# Patient Record
Sex: MALE | Race: WHITE | NOT HISPANIC OR LATINO | Employment: FULL TIME | URBAN - METROPOLITAN AREA
[De-identification: names, ages, dates, MRNs, and addresses within clinical notes are randomized per-mention and may not be internally consistent; named-entity substitution may affect disease eponyms.]

---

## 2017-02-03 ENCOUNTER — OFFICE VISIT (OUTPATIENT)
Dept: URGENT CARE | Facility: CLINIC | Age: 67
End: 2017-02-03
Payer: MEDICARE

## 2017-02-03 VITALS
TEMPERATURE: 98.2 F | HEART RATE: 100 BPM | SYSTOLIC BLOOD PRESSURE: 146 MMHG | OXYGEN SATURATION: 96 % | RESPIRATION RATE: 16 BRPM | DIASTOLIC BLOOD PRESSURE: 84 MMHG

## 2017-02-03 DIAGNOSIS — R05.9 COUGH: ICD-10-CM

## 2017-02-03 DIAGNOSIS — J01.00 ACUTE MAXILLARY SINUSITIS, RECURRENCE NOT SPECIFIED: ICD-10-CM

## 2017-02-03 PROCEDURE — G8432 DEP SCR NOT DOC, RNG: HCPCS | Performed by: PHYSICIAN ASSISTANT

## 2017-02-03 PROCEDURE — G8484 FLU IMMUNIZE NO ADMIN: HCPCS | Performed by: PHYSICIAN ASSISTANT

## 2017-02-03 PROCEDURE — 1101F PT FALLS ASSESS-DOCD LE1/YR: CPT | Mod: 8P | Performed by: PHYSICIAN ASSISTANT

## 2017-02-03 PROCEDURE — 4004F PT TOBACCO SCREEN RCVD TLK: CPT | Performed by: PHYSICIAN ASSISTANT

## 2017-02-03 PROCEDURE — 99214 OFFICE O/P EST MOD 30 MIN: CPT | Performed by: PHYSICIAN ASSISTANT

## 2017-02-03 PROCEDURE — 4040F PNEUMOC VAC/ADMIN/RCVD: CPT | Performed by: PHYSICIAN ASSISTANT

## 2017-02-03 PROCEDURE — G8598 ASA/ANTIPLAT THER USED: HCPCS | Performed by: PHYSICIAN ASSISTANT

## 2017-02-03 PROCEDURE — G8420 CALC BMI NORM PARAMETERS: HCPCS | Performed by: PHYSICIAN ASSISTANT

## 2017-02-03 PROCEDURE — 3017F COLORECTAL CA SCREEN DOC REV: CPT | Mod: 1P | Performed by: PHYSICIAN ASSISTANT

## 2017-02-03 RX ORDER — AMOXICILLIN AND CLAVULANATE POTASSIUM 875; 125 MG/1; MG/1
1 TABLET, FILM COATED ORAL 2 TIMES DAILY
Qty: 20 TAB | Refills: 0 | Status: SHIPPED | OUTPATIENT
Start: 2017-02-03 | End: 2017-05-18

## 2017-02-03 NOTE — PROGRESS NOTES
Subjective:      Evans Díaz is a 66 y.o. male who presents with Cough    PMH:  has a past medical history of Bronchitis; Pulmonary hypertension (CMS-HCC); Chronic airway obstruction, not elsewhere classified (CMS-HCC); and Hypertension.  MEDS:   Current outpatient prescriptions:   •  atorvastatin (LIPITOR) 10 MG Tab, Take 1 Tab by mouth every day., Disp: 30 Tab, Rfl: 11  •  enalapril (VASOTEC) 10 MG Tab, Take 1 Tab by mouth 2 times a day., Disp: 180 Tab, Rfl: 3  •  carvedilol (COREG) 6.25 MG Tab, Take 1 Tab by mouth 2 times a day, with meals., Disp: 60 Tab, Rfl: 11  •  aspirin (ASA) 81 MG CHEW chewable tablet, Take 1 Tab by mouth every day., Disp: 100 Tab, Rfl: 11  •  furosemide (LASIX) 20 MG Tab, Take 1 Tab by mouth 1 time daily as needed., Disp: 30 Tab, Rfl: 2  ALLERGIES: No Known Allergies  SURGHX:   Past Surgical History   Procedure Laterality Date   • Cardiac cath  3/1/15     CAD but not CABG candidate.  99% RCA.     SOCHX:  reports that he has been smoking.  He has never used smokeless tobacco. He reports that he does not drink alcohol or use illicit drugs.  FH: family history includes Heart Disease (age of onset: 44) in his brother.          HPI Comments: Patient presents with:  Cough: congestion, X 1 week         Sinusitis  This is a new problem. The current episode started 1 to 4 weeks ago. The problem has been gradually worsening since onset. There has been no fever. The pain is moderate. Associated symptoms include congestion, coughing, headaches and sinus pressure. Past treatments include saline sprays and spray decongestants. The treatment provided mild relief.       Review of Systems   HENT: Positive for congestion and sinus pressure.    Respiratory: Positive for cough and sputum production. Negative for wheezing.    Neurological: Positive for headaches.   All other systems reviewed and are negative.         Objective:     /84 mmHg  Pulse 100  Temp(Src) 36.8 °C (98.2 °F)  Resp 16   SpO2 96%     Physical Exam   Constitutional: He is oriented to person, place, and time. He appears well-developed and well-nourished. No distress.   HENT:   Head: Normocephalic.   Right Ear: Tympanic membrane normal.   Left Ear: Tympanic membrane normal.   Nose: Mucosal edema and rhinorrhea present. Right sinus exhibits maxillary sinus tenderness. Left sinus exhibits maxillary sinus tenderness.   Mouth/Throat: Uvula is midline, oropharynx is clear and moist and mucous membranes are normal.   Eyes: EOM are normal. Pupils are equal, round, and reactive to light.   Neck: Normal range of motion. Neck supple.   Cardiovascular: Normal rate, regular rhythm and normal heart sounds.    Pulmonary/Chest: Effort normal and breath sounds normal. He has no rales.   Abdominal: Soft.   Musculoskeletal: Normal range of motion.   Neurological: He is alert and oriented to person, place, and time.   Skin: Skin is warm.   Psychiatric: He has a normal mood and affect.   Nursing note and vitals reviewed.        Assessment/Plan:     1. Acute maxillary sinusitis, recurrence not specified  amoxicillin-clavulanate (AUGMENTIN) 875-125 MG Tab   2. Cough  amoxicillin-clavulanate (AUGMENTIN) 875-125 MG Tab   PT can continue OTC medications, increase fluids and rest until symptoms improve.     PT should follow up with PCP in 1-2 days for re-evaluation if symptoms have not improved.  Discussed red flags and reasons to return to UC or ED.  Pt and/or family verbalized understanding of diagnosis and follow up instructions and was given informational handout on diagnosis.  PT discharged.

## 2017-02-03 NOTE — MR AVS SNAPSHOT
Evans Díaz   2/3/2017 10:45 AM   Office Visit   MRN: [id number]    Department:  [facility]   Dept Phone:  [phone]    Description:  Male : 1950   Provider:  Melody rCowder PA-C           Reason for Visit     Cough congestion, X 1 week       Allergies as of 2/3/2017     No Known Allergies      You were diagnosed with     Acute maxillary sinusitis, recurrence not specified   [[id number]]       Cough   [786.2.ICD-9-CM]         Vital Signs     Blood Pressure Pulse Temperature Respirations Oxygen Saturation Smoking Status    146/84 mmHg 100 36.8 °C (98.2 °F) 16 96% Current Some Day Smoker      Basic Information     Date Of Birth Sex Race Ethnicity Preferred Language    1950 Male White Non- English      Problem List              ICD-10-CM Priority Class Noted - Resolved    COPD exacerbation (CMS-HCC) J44.1 Medium  2015 - Present    Dilated cardiomyopathy (CMS-HCC) I42.0 High  2015 - Present    Systolic CHF with reduced left ventricular function, NYHA class 2 (CMS-HCC) I50.20   2015 - Present    CAD (coronary artery disease) I25.10   2015 - Present    CKD (chronic kidney disease) stage 3, GFR 30-59 ml/min N18.3   10/1/2015 - Present    Cigarette smoker F17.210   10/6/2016 - Present      Health Maintenance        Date Due Completion Dates    IMM DTaP/Tdap/Td Vaccine (1 - Tdap) 1967 ---    COLONOSCOPY 1998 ---    IMM ZOSTER VACCINE 2008 ---    IMM PNEUMOCOCCAL 65+ (ADULT) LOW/MEDIUM RISK SERIES (2 of 2 - PCV13) 2016    IMM INFLUENZA (1) 2016            Current Immunizations     Influenza TIV (IM) 2014    Pneumococcal polysaccharide vaccine (PPSV-23) 2015 10:08 PM      Below and/or attached are the medications your provider expects you to take. Review all of your home medications and newly ordered medications with your provider and/or pharmacist. Follow medication instructions as directed by your provider and/or  pharmacist. Please keep your medication list with you and share with your provider. Update the information when medications are discontinued, doses are changed, or new medications (including over-the-counter products) are added; and carry medication information at all times in the event of emergency situations     Allergies:  No Known Allergies          Medications  Valid as of: February 03, 2017 - 11:30 AM    Generic Name Brand Name Tablet Size Instructions for use    Amoxicillin-Pot Clavulanate (Tab) AUGMENTIN 875-125 MG Take 1 Tab by mouth 2 times a day.        Aspirin (Chew Tab) ASA 81 MG Take 1 Tab by mouth every day.        Atorvastatin Calcium (Tab) LIPITOR 10 MG Take 1 Tab by mouth every day.        Carvedilol (Tab) COREG 6.25 MG Take 1 Tab by mouth 2 times a day, with meals.        Enalapril Maleate (Tab) VASOTEC 10 MG Take 1 Tab by mouth 2 times a day.        Furosemide (Tab) LASIX 20 MG Take 1 Tab by mouth 1 time daily as needed.        .                 Medicines prescribed today were sent to:     [facility]ACY #[id number] - [place], [address] [address]    Phone: [phone] Fax: [phone]    Open 24 Hours?: No      Medication refill instructions:       If your prescription bottle indicates you have medication refills left, it is not necessary to call your provider’s office. Please contact your pharmacy and they will refill your medication.    If your prescription bottle indicates you do not have any refills left, you may request refills at any time through one of the following ways: The online [employer] system (except Urgent Care), by calling your provider’s office, or by asking your pharmacy to contact your provider’s office with a refill request. Medication refills are processed only during regular business hours and may not be available until the next business day. Your provider may request additional information or to have a follow-up visit  with you prior to refilling your medication.   *Please Note: Medication refills are assigned a new Rx number when refilled electronically. Your pharmacy may indicate that no refills were authorized even though a new prescription for the same medication is available at the pharmacy. Please request the medicine by name with the pharmacy before contacting your provider for a refill.           [employer] Access Code: Activation code not generated  Current [employer] Status: Active

## 2017-02-06 ASSESSMENT — ENCOUNTER SYMPTOMS
SINUS PRESSURE: 1
COUGH: 1
SPUTUM PRODUCTION: 1
WHEEZING: 0
HEADACHES: 1

## 2017-03-10 ENCOUNTER — APPOINTMENT (OUTPATIENT)
Dept: RADIOLOGY | Facility: IMAGING CENTER | Age: 67
End: 2017-03-10
Attending: FAMILY MEDICINE
Payer: COMMERCIAL

## 2017-03-10 ENCOUNTER — OFFICE VISIT (OUTPATIENT)
Dept: URGENT CARE | Facility: CLINIC | Age: 67
End: 2017-03-10
Payer: COMMERCIAL

## 2017-03-10 VITALS
WEIGHT: 186 LBS | DIASTOLIC BLOOD PRESSURE: 64 MMHG | SYSTOLIC BLOOD PRESSURE: 124 MMHG | HEIGHT: 69 IN | RESPIRATION RATE: 16 BRPM | BODY MASS INDEX: 27.55 KG/M2 | OXYGEN SATURATION: 95 % | TEMPERATURE: 98 F | HEART RATE: 78 BPM

## 2017-03-10 DIAGNOSIS — R05.9 COUGH: ICD-10-CM

## 2017-03-10 DIAGNOSIS — J44.1 COPD EXACERBATION (HCC): ICD-10-CM

## 2017-03-10 PROCEDURE — 4040F PNEUMOC VAC/ADMIN/RCVD: CPT | Performed by: FAMILY MEDICINE

## 2017-03-10 PROCEDURE — 3017F COLORECTAL CA SCREEN DOC REV: CPT | Mod: 1P | Performed by: FAMILY MEDICINE

## 2017-03-10 PROCEDURE — G8598 ASA/ANTIPLAT THER USED: HCPCS | Performed by: FAMILY MEDICINE

## 2017-03-10 PROCEDURE — 4004F PT TOBACCO SCREEN RCVD TLK: CPT | Performed by: FAMILY MEDICINE

## 2017-03-10 PROCEDURE — 71020 DX-CHEST-2 VIEWS: CPT | Mod: TC | Performed by: FAMILY MEDICINE

## 2017-03-10 PROCEDURE — 99214 OFFICE O/P EST MOD 30 MIN: CPT | Performed by: FAMILY MEDICINE

## 2017-03-10 PROCEDURE — G8420 CALC BMI NORM PARAMETERS: HCPCS | Performed by: FAMILY MEDICINE

## 2017-03-10 PROCEDURE — G8432 DEP SCR NOT DOC, RNG: HCPCS | Performed by: FAMILY MEDICINE

## 2017-03-10 PROCEDURE — G8484 FLU IMMUNIZE NO ADMIN: HCPCS | Performed by: FAMILY MEDICINE

## 2017-03-10 PROCEDURE — 1101F PT FALLS ASSESS-DOCD LE1/YR: CPT | Mod: 8P | Performed by: FAMILY MEDICINE

## 2017-03-10 RX ORDER — DOXYCYCLINE HYCLATE 100 MG
100 TABLET ORAL 2 TIMES DAILY
Qty: 20 TAB | Refills: 0 | Status: SHIPPED | OUTPATIENT
Start: 2017-03-10 | End: 2017-03-20

## 2017-03-10 RX ORDER — IPRATROPIUM BROMIDE AND ALBUTEROL SULFATE 2.5; .5 MG/3ML; MG/3ML
3 SOLUTION RESPIRATORY (INHALATION) ONCE
Status: COMPLETED | OUTPATIENT
Start: 2017-03-10 | End: 2017-03-10

## 2017-03-10 RX ORDER — ALBUTEROL SULFATE 90 UG/1
2 AEROSOL, METERED RESPIRATORY (INHALATION) EVERY 6 HOURS PRN
Qty: 1 INHALER | Refills: 0 | Status: ON HOLD | OUTPATIENT
Start: 2017-03-10 | End: 2017-06-01

## 2017-03-10 RX ORDER — PREDNISONE 10 MG/1
40 TABLET ORAL DAILY
Qty: 30 TAB | Refills: 0 | Status: SHIPPED | OUTPATIENT
Start: 2017-03-10 | End: 2017-03-14

## 2017-03-10 RX ADMIN — IPRATROPIUM BROMIDE AND ALBUTEROL SULFATE 3 ML: 2.5; .5 SOLUTION RESPIRATORY (INHALATION) at 13:51

## 2017-03-10 NOTE — PROGRESS NOTES
Chief Complaint   Patient presents with   • Cough             Cough  This is a new problem. The current episode started 1 week ago. The problem has been gradually worsening. The problem occurs constantly. The cough is productive of sputum. Associated symptoms include ear congestion, ear pain and nasal congestion. Pertinent negatives include no fever, headaches, sweats, weight loss or wheezing. Nothing aggravates the symptoms.  Patient has tried nothing for the symptoms. There is a history of COPD and current smoker.     Social History   Substance Use Topics   • Smoking status: Current Some Day Smoker -- 0.25 packs/day for 60 years     Last Attempt to Quit: 05/22/2014   • Smokeless tobacco: Never Used   • Alcohol Use: No         Current Outpatient Prescriptions on File Prior to Visit   Medication Sig Dispense Refill   • atorvastatin (LIPITOR) 10 MG Tab Take 1 Tab by mouth every day. 30 Tab 11   • enalapril (VASOTEC) 10 MG Tab Take 1 Tab by mouth 2 times a day. 180 Tab 3   • carvedilol (COREG) 6.25 MG Tab Take 1 Tab by mouth 2 times a day, with meals. 60 Tab 11   • aspirin (ASA) 81 MG CHEW chewable tablet Take 1 Tab by mouth every day. 100 Tab 11   • amoxicillin-clavulanate (AUGMENTIN) 875-125 MG Tab Take 1 Tab by mouth 2 times a day. 20 Tab 0   • furosemide (LASIX) 20 MG Tab Take 1 Tab by mouth 1 time daily as needed. 30 Tab 2     No current facility-administered medications on file prior to visit.            Past Medical History   Diagnosis Date   • Bronchitis    • Pulmonary hypertension (CMS-HCC)    • Chronic airway obstruction, not elsewhere classified (CMS-HCC)    • Hypertension            Review of Systems   Constitutional: Negative for fever and weight loss.   HENT: negative for ear pain.    Cardiovascular - denies chest pain or dyspnea  Respiratory: Positive for cough.  Cough is productive.  Negative for wheezing.    Neurological: Negative for headaches.   GI - denies nausea, vomiting or diarrhea  Neuro -  "denies numbness or tingling.            Objective:     Blood pressure 124/64, pulse 78, temperature 36.7 °C (98 °F), resp. rate 16, height 1.753 m (5' 9\"), weight 84.369 kg (186 lb), SpO2 95 %.    Physical Exam   Constitutional: patient is oriented to person, place, and time. Patient appears well-developed and well-nourished. No distress.   HENT:   Head: Normocephalic and atraumatic.   Right Ear: External ear normal.   Left Ear: External ear normal.   Nose: Mucosal edema and rhinorrhea present. Right sinus exhibits no maxillary sinus tenderness. Left sinus exhibits no maxillary sinus tenderness.   Mouth/Throat: Mucous membranes are normal. No oral lesions. Posterior oropharyngeal erythema present. No oropharyngeal exudate or posterior oropharyngeal edema.   Eyes: Conjunctivae and EOM are normal. Pupils are equal, round, and reactive to light. Right eye exhibits no discharge. Left eye exhibits no discharge. No scleral icterus.   Neck: Normal range of motion. Neck supple. No tracheal deviation present.   Cardiovascular: Normal rate, regular rhythm and normal heart sounds.  Exam reveals no friction rub.    Pulmonary/Chest: Effort normal. No respiratory distress. Patient has no wheezes. Patient has rhonchi. Patient has no rales.    Musculoskeletal:  exhibits no edema.   Lymphadenopathy:     Patient has cervical adenopathy.   Neurological: patient is alert and oriented to person, place, and time.   Skin: Skin is warm and dry. No rash noted. No erythema.   Psychiatric: patient  has a normal mood and affect.  behavior is normal.   Nursing note and vitals reviewed.         Narrative        3/10/2017 12:46 PM    HISTORY/REASON FOR EXAM: Productive cough and fever      TECHNIQUE/EXAM DESCRIPTION AND NUMBER OF VIEWS:  Two views of the chest.    COMPARISON:  1 view chest 3/4/2015    FINDINGS:  The lungs are clear.    The lungs are hyperinflated consistent with chronic obstructive pulmonary disease.    The cardiac silhouette is " normal in size.    There are no pleural effusion.    There are no pneumothoraces.    No significant bony abnormality is present.         Impression        1.  No acute cardiopulmonary abnormality identified.    2.  Chronic obstructive pulmonary disease         Reading Provider Reading Date     Laen Alonzo M.D. Mar 10, 2017            Assessment/Plan:       1. COPD exacerbation (CMS-[facility])  Chest x-ray was personally interpreted and reviewed. Lungs are hyperexpanded.   No acute cardiopulmonary findings. No pulmonary infiltrates or densities. Cardiac silhouette is normal. No hemidiaphragm elevation. No bony abnormalities.    Subjective improvement after duoneb tx      - predniSONE (DELTASONE) 10 MG Tab; Take 4 Tabs by mouth every day for 4 days.  Dispense: 30 Tab; Refill: 0  - doxycycline (VIBRAMYCIN) 100 MG Tab; Take 1 Tab by mouth 2 times a day for 10 days.  Dispense: 20 Tab; Refill: 0  - albuterol 108 (90 BASE) MCG/ACT Aero Soln inhalation aerosol; Inhale 2 Puffs by mouth every 6 hours as needed for Shortness of Breath.  Dispense: 1 Inhaler; Refill: 0  - ipratropium (ATROVENT) 17 MCG/ACT Aero Soln; Inhale 2 Puffs by mouth 3 times a day.  Dispense: 1 Inhaler; Refill: 0    Encouraged smoking cessation        - REFERRAL TO PULMONOLOGY         Supportive care, differential diagnoses, and indications for immediate follow-up discussed with patient.   Pathogenesis of diagnosis discussed including typical length and natural progression.   Instructed to return to clinic or nearest emergency department for any change in condition, further concerns, or worsening of symptoms.  Patient states understanding of the plan of care and discharge instructions.

## 2017-03-10 NOTE — MR AVS SNAPSHOT
"        Evans Díaz   3/10/2017 12:15 PM   Office Visit   MRN: [id number]    Department:  [facility]   Dept Phone:  [phone]    Description:  Male : 1950   Provider:  Randy Covington M.D.           Reason for Visit     Cough           Allergies as of 3/10/2017     No Known Allergies      You were diagnosed with     COPD exacerbation (CMS-HCC)   [181486]         Vital Signs     Blood Pressure Pulse Temperature Respirations Height Weight    124/64 mmHg 78 36.7 °C (98 °F) 16 1.753 m (5' 9\") 84.369 kg (186 lb)    Body Mass Index Oxygen Saturation Smoking Status             27.45 kg/m2 95% Current Some Day Smoker         Basic Information     Date Of Birth Sex Race Ethnicity Preferred Language    1950 Male White Non- English      Your appointments     May 10, 2017 11:00 AM   FOLLOW UP with Carmelo Jacob M.D.   [facility] (--)    [address], [facility] [address]  [facility] [id number] [phone]              Problem List              ICD-10-CM Priority Class Noted - Resolved    COPD exacerbation (CMS-HCC) J44.1 Medium  2015 - Present    Dilated cardiomyopathy (CMS-HCC) I42.0 High  2015 - Present    Systolic CHF with reduced left ventricular function, NYHA class 2 (CMS-HCC) I50.20   2015 - Present    CAD (coronary artery disease) I25.10   2015 - Present    CKD (chronic kidney disease) stage 3, GFR 30-59 ml/min N18.3   10/1/2015 - Present    Cigarette smoker F17.210   10/6/2016 - Present      Health Maintenance        Date Due Completion Dates    IMM DTaP/Tdap/Td Vaccine (1 - Tdap) 1967 ---    COLONOSCOPY 1998 ---    IMM ZOSTER VACCINE 2008 ---    IMM PNEUMOCOCCAL 65+ (ADULT) LOW/MEDIUM RISK SERIES (2 of 2 - PCV13) 2016    IMM INFLUENZA (1) 2016            Current Immunizations     Influenza TIV (IM) 2014    Pneumococcal polysaccharide vaccine (PPSV-23) 2015 10:08 PM      Below " and/or attached are the medications your provider expects you to take. Review all of your home medications and newly ordered medications with your provider and/or pharmacist. Follow medication instructions as directed by your provider and/or pharmacist. Please keep your medication list with you and share with your provider. Update the information when medications are discontinued, doses are changed, or new medications (including over-the-counter products) are added; and carry medication information at all times in the event of emergency situations     Allergies:  No Known Allergies          Medications  Valid as of: March 10, 2017 -  1:40 PM    Generic Name Brand Name Tablet Size Instructions for use    Albuterol Sulfate (Aero Soln) albuterol 108 (90 BASE) MCG/ACT Inhale 2 Puffs by mouth every 6 hours as needed for Shortness of Breath.        Amoxicillin-Pot Clavulanate (Tab) AUGMENTIN 875-125 MG Take 1 Tab by mouth 2 times a day.        Aspirin (Chew Tab) ASA 81 MG Take 1 Tab by mouth every day.        Atorvastatin Calcium (Tab) LIPITOR 10 MG Take 1 Tab by mouth every day.        Carvedilol (Tab) COREG 6.25 MG Take 1 Tab by mouth 2 times a day, with meals.        Doxycycline Hyclate (Tab) VIBRAMYCIN 100 MG Take 1 Tab by mouth 2 times a day for 10 days.        Enalapril Maleate (Tab) VASOTEC 10 MG Take 1 Tab by mouth 2 times a day.        Furosemide (Tab) LASIX 20 MG Take 1 Tab by mouth 1 time daily as needed.        Ipratropium Bromide HFA (Aero Soln) ATROVENT 17 MCG/ACT Inhale 2 Puffs by mouth 3 times a day.        PredniSONE (Tab) DELTASONE 10 MG Take 4 Tabs by mouth every day for 4 days.        .                 Medicines prescribed today were sent to:     [facility]/PHARMACY #[id number] - [place], [place] - [address]    [address] [facility] [place] [address]    Phone: [phone] Fax: [phone]    Open 24 Hours?: No      Medication refill instructions:       If your prescription bottle indicates you  have medication refills left, it is not necessary to call your provider’s office. Please contact your pharmacy and they will refill your medication.    If your prescription bottle indicates you do not have any refills left, you may request refills at any time through one of the following ways: The online [email or web address] system (except Urgent Care), by calling your provider’s office, or by asking your pharmacy to contact your provider’s office with a refill request. Medication refills are processed only during regular business hours and may not be available until the next business day. Your provider may request additional information or to have a follow-up visit with you prior to refilling your medication.   *Please Note: Medication refills are assigned a new Rx number when refilled electronically. Your pharmacy may indicate that no refills were authorized even though a new prescription for the same medication is available at the pharmacy. Please request the medicine by name with the pharmacy before contacting your provider for a refill.        Your To Do List     Future Labs/Procedures Complete By Expires    DX-CHEST-2 VIEWS  As directed 3/10/2018      Referral     A referral request has been sent to our patient care coordination department. Please allow 3-5 business days for us to process this request and contact you either by phone or mail. If you do not hear from us by the 5th business day, please call us at [phone].           [email or web address] Access Code: Activation code not generated  Current [email or web address] Status: Active          Quit Tobacco Information     Do you want to quit using tobacco?    Quitting tobacco decreases risks of cancer, heart and lung disease, increases life expectancy, improves sense of taste and smell, and increases spending money, among other benefits.    If you are thinking about quitting, we can help.  • [facility] Quit Tobacco Program: 632.975.3071  o Program occurs weekly for four weeks and includes  pharmacist consultation on products to support quitting smoking or chewing tobacco. A provider referral is needed for pharmacist consultation.  • Tobacco Users Help Hotline: 5-800QUIT-NOW (820-6455) or https://[place].quitlogix.org/  o Free, confidential telephone and online coaching for [place] residents. Sessions are designed on a schedule that is convenient for you. Eligible clients receive free nicotine replacement therapy.  • Nationally: www.smokefree.gov  o Information and professional assistance to support both immediate and long-term needs as you become, and remain, a non-smoker. Smokefree.gov allows you to choose the help that best fits your needs.

## 2017-05-10 ENCOUNTER — TELEPHONE (OUTPATIENT)
Dept: CARDIOLOGY | Facility: MEDICAL CENTER | Age: 67
End: 2017-05-10

## 2017-05-10 ENCOUNTER — OFFICE VISIT (OUTPATIENT)
Dept: CARDIOLOGY | Facility: MEDICAL CENTER | Age: 67
End: 2017-05-10
Payer: COMMERCIAL

## 2017-05-10 VITALS
OXYGEN SATURATION: 91 % | DIASTOLIC BLOOD PRESSURE: 80 MMHG | HEIGHT: 69 IN | BODY MASS INDEX: 27.25 KG/M2 | WEIGHT: 184 LBS | HEART RATE: 82 BPM | SYSTOLIC BLOOD PRESSURE: 110 MMHG

## 2017-05-10 DIAGNOSIS — F17.210 CIGARETTE SMOKER: ICD-10-CM

## 2017-05-10 DIAGNOSIS — I25.10 CORONARY ARTERY DISEASE INVOLVING NATIVE CORONARY ARTERY OF NATIVE HEART WITHOUT ANGINA PECTORIS: ICD-10-CM

## 2017-05-10 DIAGNOSIS — N18.30 CKD (CHRONIC KIDNEY DISEASE) STAGE 3, GFR 30-59 ML/MIN (HCC): ICD-10-CM

## 2017-05-10 DIAGNOSIS — I42.0 DILATED CARDIOMYOPATHY (HCC): ICD-10-CM

## 2017-05-10 DIAGNOSIS — J44.1 COPD EXACERBATION (HCC): ICD-10-CM

## 2017-05-10 PROCEDURE — G8419 CALC BMI OUT NRM PARAM NOF/U: HCPCS | Performed by: INTERNAL MEDICINE

## 2017-05-10 PROCEDURE — 4004F PT TOBACCO SCREEN RCVD TLK: CPT | Performed by: INTERNAL MEDICINE

## 2017-05-10 PROCEDURE — 99213 OFFICE O/P EST LOW 20 MIN: CPT | Performed by: INTERNAL MEDICINE

## 2017-05-10 PROCEDURE — 1101F PT FALLS ASSESS-DOCD LE1/YR: CPT | Mod: 8P | Performed by: INTERNAL MEDICINE

## 2017-05-10 PROCEDURE — G8432 DEP SCR NOT DOC, RNG: HCPCS | Performed by: INTERNAL MEDICINE

## 2017-05-10 PROCEDURE — 4040F PNEUMOC VAC/ADMIN/RCVD: CPT | Performed by: INTERNAL MEDICINE

## 2017-05-10 PROCEDURE — G8598 ASA/ANTIPLAT THER USED: HCPCS | Performed by: INTERNAL MEDICINE

## 2017-05-10 ASSESSMENT — ENCOUNTER SYMPTOMS
DIZZINESS: 0
PALPITATIONS: 0
WEAKNESS: 0
BLURRED VISION: 0
VOMITING: 0
NAUSEA: 0
SHORTNESS OF BREATH: 1
CONSTITUTIONAL NEGATIVE: 1

## 2017-05-10 NOTE — TELEPHONE ENCOUNTER
----- Message from Rita Lynch, Med Ass't sent at 5/10/2017 12:52 PM PDT -----  Regarding: Heart cath  kC,    Per Dr. Jacob - this patient needs to be scheduled for a LHC w/poss. Can you please call this patient and schedule him?    Thank You,  Rita

## 2017-05-10 NOTE — PROGRESS NOTES
Subjective:   Evans Díaz is a 67 y.o. male who presents today for follow-up of dilated cardiomyopathy and coronary disease. He underwent angiography in February 2015 because of new onset of LV dysfunction. At that time, he was found to have an ejection fraction of 20%, high-grade right coronary lesion and a moderate LAD lesion. Coronary intervention was not performed because of the severity of his LV dysfunction.   With medical therapy his LV function has recovered a recent echo shows his EF is 55%. He's had no angina.  Unfortunately he is still smoking cigarettes. His wife passed way recently from Benítez occasions of lung disease. He is not taking any furosemide and has had no edema.    Past Medical History   Diagnosis Date   • Bronchitis    • Pulmonary hypertension (CMS-HCC)    • Chronic airway obstruction, not elsewhere classified    • Hypertension      Past Surgical History   Procedure Laterality Date   • Cardiac cath  3/1/15     CAD but not CABG candidate.  99% RCA.     Family History   Problem Relation Age of Onset   • Heart Disease Brother 44     valve replacement     History   Smoking status   • Current Some Day Smoker -- 0.25 packs/day for 60 years   • Last Attempt to Quit: 05/22/2014   Smokeless tobacco   • Never Used     No Known Allergies  Outpatient Encounter Prescriptions as of 5/10/2017   Medication Sig Dispense Refill   • albuterol 108 (90 BASE) MCG/ACT Aero Soln inhalation aerosol Inhale 2 Puffs by mouth every 6 hours as needed for Shortness of Breath. 1 Inhaler 0   • ipratropium (ATROVENT) 17 MCG/ACT Aero Soln Inhale 2 Puffs by mouth 3 times a day. 1 Inhaler 0   • amoxicillin-clavulanate (AUGMENTIN) 875-125 MG Tab Take 1 Tab by mouth 2 times a day. 20 Tab 0   • atorvastatin (LIPITOR) 10 MG Tab Take 1 Tab by mouth every day. 30 Tab 11   • enalapril (VASOTEC) 10 MG Tab Take 1 Tab by mouth 2 times a day. 180 Tab 3   • carvedilol (COREG) 6.25 MG Tab Take 1 Tab by mouth 2 times a day, with meals.  "60 Tab 11   • furosemide (LASIX) 20 MG Tab Take 1 Tab by mouth 1 time daily as needed. 30 Tab 2   • aspirin (ASA) 81 MG CHEW chewable tablet Take 1 Tab by mouth every day. 100 Tab 11     No facility-administered encounter medications on file as of 5/10/2017.     Review of Systems   Constitutional: Negative.  Negative for malaise/fatigue.   Eyes: Negative for blurred vision.   Respiratory: Positive for shortness of breath.    Cardiovascular: Negative for chest pain, palpitations and leg swelling.   Gastrointestinal: Negative for nausea and vomiting.   Neurological: Negative for dizziness and weakness.        Objective:   /80 mmHg  Pulse 82  Ht 1.753 m (5' 9.02\")  Wt 83.462 kg (184 lb)  BMI 27.16 kg/m2  SpO2 91%    Physical Exam   Constitutional: He is oriented to person, place, and time. He appears well-developed and well-nourished. No distress.   HENT:   Head: Normocephalic and atraumatic.   Eyes: Conjunctivae and EOM are normal. Pupils are equal, round, and reactive to light.   Neck: Neck supple. No JVD present.   Cardiovascular: Normal rate and regular rhythm.    Murmur heard.   Systolic murmur is present with a grade of 1/6   Pulmonary/Chest: Effort normal and breath sounds normal. No respiratory distress. He has no wheezes. He has no rales.   Abdominal: Soft. There is no tenderness.   Musculoskeletal: He exhibits no edema.   Neurological: He is alert and oriented to person, place, and time.   Skin: Skin is warm and dry. He is not diaphoretic.   Psychiatric: He has a normal mood and affect.       Assessment:     1. Dilated cardiomyopathy (CMS-HCC)  COMP METABOLIC PANEL    CBC WITH DIFFERENTIAL   2. Coronary artery disease involving native coronary artery of native heart without angina pectoris  COMP METABOLIC PANEL    LIPID PROFILE    CBC WITH DIFFERENTIAL   3. CKD (chronic kidney disease) stage 3, GFR 30-59 ml/min  CBC WITH DIFFERENTIAL   4. Cigarette smoker  LIPID PROFILE    CBC WITH DIFFERENTIAL "   5. COPD exacerbation (CMS-[facility])         Medical Decision Making:  Today's Assessment / Status / Plan:     His LV function is recovered remarkably. Angiography revealed coronary artery disease as described above. He's had no willard angina. His angiograms will be personally reviewed and based on this will make a recommendation regarding either repeat angiography with intent for intervention or stress testing. Continue current medications. Cigarette smoking cessation was discussed today.  The patient's angiograms were personally reviewed. It is a high grade concentric ring proximal right coronary artery lesion and an elongated mid LAD lesion that is calcified. We recommend he undergo repeat angiography  with probable secondary to his right coronary artery and consideration of stenting of his LAD as well. The patient will be notified of the findings and scheduled for angiography.

## 2017-05-10 NOTE — TELEPHONE ENCOUNTER
Patient is scheduled on 5-22-17 for a C w/poss with Dr. Jacob at [facility]. No meds to stop. Patient was told to check in at 6:00am for a 7:30 procedure. Instruction sheet was mailed to patient.

## 2017-05-10 NOTE — MR AVS SNAPSHOT
"        Evans Díaz   5/10/2017 11:00 AM   Office Visit   MRN: [id number]    Department:  [facility]   Dept Phone:  [phone]    Description:  Male : 1950   Provider:  Carmelo Jacob M.D.           Reason for Visit     Follow-Up           Allergies as of 5/10/2017     No Known Allergies      You were diagnosed with     Dilated cardiomyopathy (CMS-HCC)   [518607]       Coronary artery disease involving native coronary artery of native heart without angina pectoris   [6443806]       CKD (chronic kidney disease) stage 3, GFR 30-59 ml/min   [550426]       Cigarette smoker   [612252]       COPD exacerbation (CMS-HCC)   [121585]         Vital Signs     Blood Pressure Pulse Height Weight Body Mass Index Oxygen Saturation    110/80 mmHg 82 1.753 m (5' 9.02\") 83.462 kg (184 lb) 27.16 kg/m2 91%    Smoking Status                   Current Some Day Smoker           Basic Information     Date Of Birth Sex Race Ethnicity Preferred Language    1950 Male White Non- English      Your appointments     2017  9:00 AM   Pulmonary Function Test with PFT-RM3   [facility] (--)    [address]  [address]  [place] [address]   [phone]            2017 10:20 AM   New Patient Pulmonary with A Rotation   [facility] (--)    [address]  [address]  [place] [address]   [phone]            Nov 10, 2017  8:40 AM   FOLLOW UP with Carmelo Jacob M.D.   [facility] (--)    [address]  [place] [address]   [phone]              Problem List              ICD-10-CM Priority Class Noted - Resolved    COPD exacerbation (CMS-HCC) J44.1 Medium  2015 - Present    Dilated cardiomyopathy (CMS-HCC) I42.0 High  2015 - Present    Systolic CHF with reduced left ventricular function, NYHA class 2 (CMS-HCC) I50.20   2015 - Present    CAD (coronary artery disease) I25.10   2015 - " Present    CKD (chronic kidney disease) stage 3, GFR 30-59 ml/min N18.3   10/1/2015 - Present    Cigarette smoker F17.210   10/6/2016 - Present      Health Maintenance        Date Due Completion Dates    IMM DTaP/Tdap/Td Vaccine (1 - Tdap) 2/4/1967 ---    COLONOSCOPY 2/4/1998 ---    IMM ZOSTER VACCINE 2/4/2008 ---    IMM PNEUMOCOCCAL 65+ (ADULT) LOW/MEDIUM RISK SERIES (2 of 2 - PCV13) 2/27/2016 2/27/2015            Current Immunizations     Influenza TIV (IM) 12/1/2014    Pneumococcal polysaccharide vaccine (PPSV-23) 2/27/2015 10:08 PM      Below and/or attached are the medications your provider expects you to take. Review all of your home medications and newly ordered medications with your provider and/or pharmacist. Follow medication instructions as directed by your provider and/or pharmacist. Please keep your medication list with you and share with your provider. Update the information when medications are discontinued, doses are changed, or new medications (including over-the-counter products) are added; and carry medication information at all times in the event of emergency situations     Allergies:  No Known Allergies          Medications  Valid as of: May 10, 2017 - 11:31 AM    Generic Name Brand Name Tablet Size Instructions for use    Albuterol Sulfate (Aero Soln) albuterol 108 (90 BASE) MCG/ACT Inhale 2 Puffs by mouth every 6 hours as needed for Shortness of Breath.        Amoxicillin-Pot Clavulanate (Tab) AUGMENTIN 875-125 MG Take 1 Tab by mouth 2 times a day.        Aspirin (Chew Tab) ASA 81 MG Take 1 Tab by mouth every day.        Atorvastatin Calcium (Tab) LIPITOR 10 MG Take 1 Tab by mouth every day.        Carvedilol (Tab) COREG 6.25 MG Take 1 Tab by mouth 2 times a day, with meals.        Enalapril Maleate (Tab) VASOTEC 10 MG Take 1 Tab by mouth 2 times a day.        Furosemide (Tab) LASIX 20 MG Take 1 Tab by mouth 1 time daily as needed.        Ipratropium Bromide HFA (Aero Soln) ATROVENT 17 MCG/ACT  Inhale 2 Puffs by mouth 3 times a day.        .                 Medicines prescribed today were sent to:     [facility] #[id number] - [place], [place] - [address]    [address] [place] [address]    Phone: [phone] Fax: [phone]    Open 24 Hours?: No      Medication refill instructions:       If your prescription bottle indicates you have medication refills left, it is not necessary to call your provider’s office. Please contact your pharmacy and they will refill your medication.    If your prescription bottle indicates you do not have any refills left, you may request refills at any time through one of the following ways: The online babbel system (except Urgent Care), by calling your provider’s office, or by asking your pharmacy to contact your provider’s office with a refill request. Medication refills are processed only during regular business hours and may not be available until the next business day. Your provider may request additional information or to have a follow-up visit with you prior to refilling your medication.   *Please Note: Medication refills are assigned a new Rx number when refilled electronically. Your pharmacy may indicate that no refills were authorized even though a new prescription for the same medication is available at the pharmacy. Please request the medicine by name with the pharmacy before contacting your provider for a refill.        Your To Do List     Future Labs/Procedures Complete By Expires    CBC WITH DIFFERENTIAL  As directed 11/9/2017    COMP METABOLIC PANEL  As directed 11/9/2017    LIPID PROFILE  As directed 11/9/2017         AudiencePointharVlingo Access Code: Activation code not generated  Current babbel Status: Active          Quit Tobacco Information     Do you want to quit using tobacco?    Quitting tobacco decreases risks of cancer, heart and lung disease, increases life expectancy, improves sense of taste and smell, and increases spending money,  among other benefits.    If you are thinking about quitting, we can help.  • [facility] Quit Tobacco Program: 451.774.4636  o Program occurs weekly for four weeks and includes pharmacist consultation on products to support quitting smoking or chewing tobacco. A provider referral is needed for pharmacist consultation.  • Tobacco Users Help Hotline: 4-800-QUIT-NOW (340-8766) or https://[place].quitlogix.org/  o Free, confidential telephone and online coaching for [place] residents. Sessions are designed on a schedule that is convenient for you. Eligible clients receive free nicotine replacement therapy.  • Nationally: www.smokefree.gov  o Information and professional assistance to support both immediate and long-term needs as you become, and remain, a non-smoker. Smokefree.gov allows you to choose the help that best fits your needs.

## 2017-05-18 ENCOUNTER — TELEPHONE (OUTPATIENT)
Dept: CARDIOLOGY | Facility: MEDICAL CENTER | Age: 67
End: 2017-05-18

## 2017-05-18 NOTE — TELEPHONE ENCOUNTER
FLORENCE Frias, from [facility] pre-admission called. Pt. Is scheduled for [facility] with Dr. Jacob on 6-22-17. Pt. Insisted to Rodriguez that he doesn't have anyone to drive him home from procedure and would like to take a cab..  Rodriguez was advised to discuss reasoning for not taking cabs. Pt. Has a daughter listed; perhaps she could drive him home.

## 2017-05-22 ENCOUNTER — HOSPITAL ENCOUNTER (OUTPATIENT)
Facility: MEDICAL CENTER | Age: 67
End: 2017-05-22
Attending: INTERNAL MEDICINE | Admitting: INTERNAL MEDICINE
Payer: COMMERCIAL

## 2017-05-22 VITALS
WEIGHT: 187.17 LBS | SYSTOLIC BLOOD PRESSURE: 139 MMHG | DIASTOLIC BLOOD PRESSURE: 78 MMHG | OXYGEN SATURATION: 96 % | TEMPERATURE: 97.4 F | HEART RATE: 70 BPM | BODY MASS INDEX: 27.72 KG/M2 | RESPIRATION RATE: 14 BRPM | HEIGHT: 69 IN

## 2017-05-22 PROCEDURE — C1887 CATHETER, GUIDING: HCPCS

## 2017-05-22 PROCEDURE — 360979 HCHG DIAGNOSTIC CATH

## 2017-05-22 PROCEDURE — 307093 HCHG TR BAND RADIAL

## 2017-05-22 PROCEDURE — 93880 EXTRACRANIAL BILAT STUDY: CPT

## 2017-05-22 PROCEDURE — 93880 EXTRACRANIAL BILAT STUDY: CPT | Mod: 26 | Performed by: SURGERY

## 2017-05-22 PROCEDURE — 99152 MOD SED SAME PHYS/QHP 5/>YRS: CPT

## 2017-05-22 PROCEDURE — 93458 L HRT ARTERY/VENTRICLE ANGIO: CPT

## 2017-05-22 PROCEDURE — 700111 HCHG RX REV CODE 636 W/ 250 OVERRIDE (IP)

## 2017-05-22 PROCEDURE — 99153 MOD SED SAME PHYS/QHP EA: CPT

## 2017-05-22 PROCEDURE — 700101 HCHG RX REV CODE 250

## 2017-05-22 PROCEDURE — C1894 INTRO/SHEATH, NON-LASER: HCPCS

## 2017-05-22 PROCEDURE — C1769 GUIDE WIRE: HCPCS

## 2017-05-22 PROCEDURE — 304952 HCHG R 2 PADS

## 2017-05-22 PROCEDURE — 93460 R&L HRT ART/VENTRICLE ANGIO: CPT

## 2017-05-22 RX ORDER — VERAPAMIL HYDROCHLORIDE 2.5 MG/ML
INJECTION, SOLUTION INTRAVENOUS
Status: COMPLETED
Start: 2017-05-22 | End: 2017-05-22

## 2017-05-22 RX ORDER — AMIODARONE HYDROCHLORIDE 150 MG/3ML
INJECTION, SOLUTION INTRAVENOUS
Status: COMPLETED
Start: 2017-05-22 | End: 2017-05-22

## 2017-05-22 RX ORDER — SODIUM CHLORIDE 9 MG/ML
INJECTION, SOLUTION INTRAVENOUS CONTINUOUS
Status: DISCONTINUED | OUTPATIENT
Start: 2017-05-22 | End: 2017-05-22 | Stop reason: HOSPADM

## 2017-05-22 RX ORDER — MIDAZOLAM HYDROCHLORIDE 1 MG/ML
INJECTION INTRAMUSCULAR; INTRAVENOUS
Status: COMPLETED
Start: 2017-05-22 | End: 2017-05-22

## 2017-05-22 RX ORDER — HEPARIN SODIUM,PORCINE 1000/ML
VIAL (ML) INJECTION
Status: COMPLETED
Start: 2017-05-22 | End: 2017-05-22

## 2017-05-22 RX ORDER — SODIUM CHLORIDE 9 MG/ML
INJECTION, SOLUTION INTRAVENOUS
Status: DISCONTINUED | OUTPATIENT
Start: 2017-05-22 | End: 2017-05-22 | Stop reason: HOSPADM

## 2017-05-22 RX ORDER — LIDOCAINE HYDROCHLORIDE 20 MG/ML
INJECTION, SOLUTION INFILTRATION; PERINEURAL
Status: COMPLETED
Start: 2017-05-22 | End: 2017-05-22

## 2017-05-22 RX ADMIN — MIDAZOLAM 2 MG: 1 INJECTION INTRAMUSCULAR; INTRAVENOUS at 07:59

## 2017-05-22 RX ADMIN — AMIODARONE HYDROCHLORIDE 150 MG: 50 INJECTION, SOLUTION INTRAVENOUS at 08:05

## 2017-05-22 RX ADMIN — VERAPAMIL HYDROCHLORIDE 5 MG: 2.5 INJECTION, SOLUTION INTRAVENOUS at 07:33

## 2017-05-22 RX ADMIN — HEPARIN SODIUM 2000 UNITS: 200 INJECTION, SOLUTION INTRAVENOUS at 07:34

## 2017-05-22 RX ADMIN — NITROGLYCERIN 10 ML: 20 INJECTION INTRAVENOUS at 07:33

## 2017-05-22 RX ADMIN — SODIUM CHLORIDE: 9 INJECTION, SOLUTION INTRAVENOUS at 08:45

## 2017-05-22 RX ADMIN — SODIUM CHLORIDE: 9 INJECTION, SOLUTION INTRAVENOUS at 06:57

## 2017-05-22 RX ADMIN — HEPARIN SODIUM: 1000 INJECTION, SOLUTION INTRAVENOUS; SUBCUTANEOUS at 07:33

## 2017-05-22 RX ADMIN — FENTANYL CITRATE 50 MCG: 50 INJECTION, SOLUTION INTRAMUSCULAR; INTRAVENOUS at 08:14

## 2017-05-22 RX ADMIN — LIDOCAINE HYDROCHLORIDE: 20 INJECTION, SOLUTION INFILTRATION; PERINEURAL at 07:33

## 2017-05-22 ASSESSMENT — PAIN SCALES - GENERAL
PAINLEVEL_OUTOF10: 0

## 2017-05-22 NOTE — PROCEDURES
DATE OF SERVICE:  05/22/2017    PROCEDURES:  1.  Selective coronary angiography.  2.  Left heart catheterization.  3.  Left ventriculography.  4.  Emergency cardioversion for ventricular fibrillation.    INDICATIONS:  A 67-year-old gentleman with history of known coronary artery   disease by prior angiography in 2015.  At that time, he had severe LV   dysfunction and coronary intervention was not performed.  The patient has had   effort angina over the last several months.    DESCRIPTION OF PROCEDURE:  The right wrist was sterilely prepped and draped in   the usual fashion and the area of the right femoral artery was anesthetized   with 2% lidocaine.  Conscious sedation was obtained using 1.5 mg of Versed and   50 mcg of fentanyl.  He received additional Versed during the procedure.    Please refer to the nurses' notes for dosages and timing.    The right radial artery was entered with the aid of an ultrasound.  The   6-Bengali sheath was placed.  He was then given 5000 units of intravenous   heparin, 2.5 mg of intra-arterial verapamil and 100 mcg of intra-arterial   nitroglycerin.  A 5-Bengali Corby catheter was placed, but would not seat well   into the right coronary artery.  Selective angiogram was performed.  Shortly   after this, the patient developed ventricular fibrillation, was cardioverted   once at 150 joules with reversion to sinus rhythm.  He was then given 150 mg   of intravenous amiodarone and the Corby catheter was manipulated into the   ostium of the left main coronary artery, where selective angiograms were   performed.    Following this, a right 4-Bengali Rita catheter was placed and advanced into   the ostium of the right coronary artery where selective angiograms were   performed.  Next, a pigtail catheter was exchanged over a wire and a left   heart catheterization was performed.  This was followed by left ventriculogram   using 30 mL of contrast.  A left heart pullback was performed.  The  catheter   was removed and then an additional 100 mcg of intra-arterial nitroglycerin was   given through the sheath.  The sheath was then removed, and hemostasis was   obtained using direct compression of the artery with Hemoband.    ESTIMATED BLOOD LOSS:  10 mL    FLUOROSCOPY TIME:  3.3 minutes.    X-RAY DOSE-AREA PRODUCT:  4952.    TOTAL CONTRAST MEDIA:  147 mL of Omnipaque 350.    COMPLICATIONS:  Ventricular fibrillation after injection of contrast in the   right coronary artery with successful cardioversion x1.    HEMODYNAMICS:  Reveal sinus rhythm throughout procedure.  Aortic pressure   136/88 mmHg.  LV pressure is 147/20 mmHg. LV pullback does not demonstrate a   gradient.    Fluoroscopy of the heart is unremarkable.    The left ventriculogram demonstrates mild hypokinesis of the anterior wall.    Estimated ejection fraction is 45-50%.  The aortic root is unremarkable.    There is no mitral insufficiency.    The right coronary is a dominant vessel and has a 95% stenosis in its proximal   segment.  Immediately distal to this stenosis is a mild area of ectasia.    Just prior to the crux is another 90% lesion.  The right coronary gives rise   to a large caliber RV free wall branch arising midway between the origin and   the acute margin and a smaller branch distally to this.  Beyond the acute   margin is also a 30% stenosis in the right coronary artery.  More distally,   the right coronary artery gives rise to small caliber PDA and a moderate   posterior left ventricular artery.    The left main is short and free of disease.  This vessel bifurcates into the   LAD and circumflex.  The LAD has a 30% lesion very proximally and then gives   rise to a small first diagonal artery and a moderate-sized second diagonal   artery.  Distal to the first diagonal artery is an elongated stenosis of   30-40%.  Beyond the second diagonal artery is another stenosis of 40% or   greater.  In the mid LAD is an 80% stenosis.   Distally, the LAD supplies the   apex and also wraps around the apex to supply the distal half of the inferior   intraventricular septum.  The circumflex has a 40% ostial lesion and then has   a 30% eccentric stenosis.  The circumflex gives rise to a large atrial branch   and 2 tiny proximal marginal arteries.  There is a large bifurcating obtuse   marginal artery present that is unremarkable.  Beyond this, the circumflex has   an eccentric elongated 40% stenosis and then terminates into small distal   marginal branches.    IMPRESSION:  1.  Coronary artery disease with a 40% ostial circumflex.  2.  A 30% proximal circumflex.  3.  Elongated 40% distal circumflex.  4.  An 80% lesion in the mid left anterior descending.  5.  Elongated 40% lesion in the [id number] left anterior descending.  6.  A 95% stenosis or greater in the proximal right coronary artery and 90%   stenosis in the distal right coronary artery.  7.  Elevated LVEDP.  8.  Moderate LV dysfunction.       ____________________________________     ALDO KEITA MD    RPS / NTS    DD:  05/22/2017 08:47:45  DT:  05/22/2017 09:35:27    D#:  [id number]  Job#:  [id number]    cc: LIAT BUCK MD

## 2017-05-22 NOTE — PROGRESS NOTES
Patient seen at bedside by CTS.    Plan for CABG next week,  Thursday 6/1/17 0800.    Pre-op appointment made 12:15 pm on Wednesday 5/31.  Echo done, check carotids today prior to DC home.  See consult note from Dr. Jones for details

## 2017-05-22 NOTE — OR NURSING
0824   PATIENT RECEIVED FROM CATH LAB,  S/P HEART CATH VIA RIGHT RADIAL.  TR BAND INTACT.  DENIES ANY PAIN.  NO FAMILY AT BEDSIDE.      0900   PATIENT TAKING PO FLUID WITHOUT DIFFICULTY.    0930  1ST 2CC OF AIR RELEASED FROM TR BAND.  SITE IS CLEAR.    0945   NEXT 3CC OF AIR RELEASED FROM TR BAND.  SITE IS CLEAR.    1000   NEXT 3CC OF AIR RELEASED FROM TR BAND.  SITE IS CLEAR.    1015   NEXT 3CC OF AIR RELEASED FROM TR BAND.  SITE IS CLEAR.    1030   LAST 1CC OF AIR RELEASED FROM TR BAND.  SITE IS CLEAR.    1035  TR BAND REMOVED AND 2X2 WITH TEGADERM APPLIED.  SITE IS CLEAR.    1100   DR WILLIAM IS HERE TO SEE PATIENT.  STAT CAROTID ORDERED AND DONE.  PATIENT TO BE D/C HOME AFTER AND WILL BE SCHEUDLED FOR [facility] SOMETIMES NEXT WEEK.    1130   DC INSTRUCTIONS GIVEN TO PATIENT.  VERBALIZED UNDERSTANDING OF ALL.   NO FAMILY HERE.  OK FOR PATIENT TO TAKE A CAB HOME.  PATIENT DC TO HOME, AMBULATORY, ESCORTED OUT BY RN TO CALL CAB.

## 2017-05-22 NOTE — CATH LAB
Immediate Post-Operative Note      PreOp Diagnosis: CAD and cardiomyopathy    PostOp Diagnosis: same    Procedure(s) :  Coronary Angiography, Left Heart Catheterization, Left Ventriculography. R radial approach  Surgeon(s):  Carmelo Jacob M.D.    Type of Anesthesia: Moderate Sedation    Specimen: None    Estimated Blood Loss: 10 cc's    Contrast Media:  147 cc's    Fluoro Time: 3.3 min    Xray DAP: 4952    Findings: Moderate LV dysfunction with EF in the 40% range.  Severe RCA stenosis proximally and at [place].  Sequential LAD lesions.    Complications: VF cardioverted to sinus without incident      Carmelo Jacob M.D.  5/22/2017 8:23 AM

## 2017-05-23 NOTE — CONSULTS
DATE OF SERVICE:  2017    CHIEF COMPLAINT:  Chest pain.    HISTORY OF PRESENT ILLNESS:  A 67-year-old gentleman, history of COPD, smokes   1-2 packs of cigarettes a day for his adult life, increased shortness of   breath, neck pain, chest pain.  He is able to walk quite a ways without any   chest pain.  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    We have seen him 2 years ago.  We thought his ejection fraction was too low   for surgery, but now his ejection fraction seems to be toward 50%, echo was   improved since 2015 reconsideration for bypass surgery by Dr. Jacob.    He has had progression of coronary disease to 90% LAD in 2 areas of circumflex   lesion of 40% and a right coronary artery with 99%.  At that time, Dr. Olivas, 2 years ago did not think he was a surgical candidate because he has   low ejection fraction of 21%, but now with a better ejection fraction, he is   a surgical candidate.    He has catheterization because echo showed increase ejection fraction and   continued chest pain.    FAMILY HISTORY:  Brother had coronary bypass surgery.  Wife recently .    PAST MEDICAL HISTORY:  COPD, history of smoking.    SOCIAL HISTORY:  Alcohol, none.  Lives with daughter.    MEDICATIONS:  Per medication list.    ALLERGIES:  None.    REVIEW OF SYSTEMS:  A 14-point review of systems negative except for that   mentioned above.  He has no stroke, TIA, no claudication.    PHYSICAL EXAMINATION:  VITAL SIGNS:  Stable vital signs, heart rate 82, blood pressure 135/85,   respirations 12.  HEENT:  PERRLA. EOM normal.  NECK:  No thyromegaly.  JVP _____.  VASCULAR:  Carotids, radials, femorals 1+, no bruits.  NEUROLOGIC:  Cranial nerves II-XII intact.  Four extremities motor function   equal, reflexes normal.  CARDIAC:  S1, S2.  No S3, no S4.  No murmur or thrill.  PULMONARY:  Clear.  No rales or rhonchi.  Moves air well.  ABDOMEN:  Flat,  benign.  GENITOURINARY AND RECTAL:  Deferred.  EXTREMITIES:  Normal range of motion.  Saphenous vein good for harvesting   bilaterally.  SKIN:  Clear.  PSYCHIATRIC:  Affect appropriate.  STERNUM:  Clear.    IMPRESSION:  1.  Angina.  2.  Coronary artery disease.  3.  Mitral aortic mild regurgitation.  4.  Cigarette smoking, active.  5.  Hypertension.  6.  Dyslipidemia.  7.  Hypercholesterolemia.  8.  Paroxysmal nocturnal fibrillation at times, but not on blood thinner.  9.  Severe dyspnea on exertion because of COPD.    This patient is a Finnish Classification IV, New York Heart Classification   IV.    His STS risk calculations 3% death and 25% morbidity and mortality.  I feel he   probably has a 5% chance of death because of severe COPD, possibility of not   coming off of ventilator, tracheostomy, prolonged ventilation and nursing home   support.    We will do surgery in a week and half after insurance clearance. He does not   have rest pain, does not have night pain, does not pain except with extreme   exertion.  We will work him up and then readmit him for surgery next   Wednesday.  Discussed with Dr. Jacob, spent 2 hours reviewing films,   cardiology discussion patient review.       ____________________________________     MD LILY ASTUDILLO / TATYANA    DD:  05/22/2017 10:53:15  DT:  05/22/2017 12:16:56    D#:  [id number]  Job#:  [id number]

## 2017-05-31 ENCOUNTER — HOSPITAL ENCOUNTER (OUTPATIENT)
Dept: RADIOLOGY | Facility: MEDICAL CENTER | Age: 67
End: 2017-05-31
Attending: THORACIC SURGERY (CARDIOTHORACIC VASCULAR SURGERY) | Admitting: THORACIC SURGERY (CARDIOTHORACIC VASCULAR SURGERY)
Payer: COMMERCIAL

## 2017-05-31 ENCOUNTER — HOSPITAL ENCOUNTER (OUTPATIENT)
Dept: CARDIOLOGY | Facility: MEDICAL CENTER | Age: 67
DRG: 236 | End: 2017-05-31
Attending: THORACIC SURGERY (CARDIOTHORACIC VASCULAR SURGERY)
Payer: COMMERCIAL

## 2017-05-31 ENCOUNTER — HOSPITAL ENCOUNTER (OUTPATIENT)
Dept: RADIOLOGY | Facility: MEDICAL CENTER | Age: 67
DRG: 236 | End: 2017-05-31
Attending: THORACIC SURGERY (CARDIOTHORACIC VASCULAR SURGERY) | Admitting: THORACIC SURGERY (CARDIOTHORACIC VASCULAR SURGERY)
Payer: COMMERCIAL

## 2017-05-31 DIAGNOSIS — I25.119 ATHEROSCLEROSIS OF NATIVE CORONARY ARTERY WITH ANGINA PECTORIS, UNSPECIFIED WHETHER NATIVE OR TRANSPLANTED HEART (HCC): ICD-10-CM

## 2017-05-31 LAB
ABO GROUP BLD: NORMAL
ALBUMIN SERPL BCP-MCNC: 4.6 G/DL (ref 3.2–4.9)
ALBUMIN/GLOB SERPL: 1.4 G/DL
ALP SERPL-CCNC: 102 U/L (ref 30–99)
ALT SERPL-CCNC: 21 U/L (ref 2–50)
ANION GAP SERPL CALC-SCNC: 7 MMOL/L (ref 0–11.9)
APPEARANCE UR: CLEAR
APTT PPP: 31.5 SEC (ref 24.7–36)
AST SERPL-CCNC: 16 U/L (ref 12–45)
BASOPHILS # BLD AUTO: 0.5 % (ref 0–1.8)
BASOPHILS # BLD: 0.05 K/UL (ref 0–0.12)
BILIRUB SERPL-MCNC: 0.4 MG/DL (ref 0.1–1.5)
BILIRUB UR QL STRIP.AUTO: NEGATIVE
BLD GP AB SCN SERPL QL: NORMAL
BUN SERPL-MCNC: 31 MG/DL (ref 8–22)
CALCIUM SERPL-MCNC: 10.1 MG/DL (ref 8.5–10.5)
CHLORIDE SERPL-SCNC: 104 MMOL/L (ref 96–112)
CO2 SERPL-SCNC: 25 MMOL/L (ref 20–33)
COLOR UR: NORMAL
CREAT SERPL-MCNC: 1.32 MG/DL (ref 0.5–1.4)
EKG IMPRESSION: NORMAL
EOSINOPHIL # BLD AUTO: 0.28 K/UL (ref 0–0.51)
EOSINOPHIL NFR BLD: 2.7 % (ref 0–6.9)
ERYTHROCYTE [DISTWIDTH] IN BLOOD BY AUTOMATED COUNT: 45.3 FL (ref 35.9–50)
EST. AVERAGE GLUCOSE BLD GHB EST-MCNC: 137 MG/DL
GFR SERPL CREATININE-BSD FRML MDRD: 54 ML/MIN/1.73 M 2
GLOBULIN SER CALC-MCNC: 3.2 G/DL (ref 1.9–3.5)
GLUCOSE SERPL-MCNC: 120 MG/DL (ref 65–99)
GLUCOSE UR STRIP.AUTO-MCNC: NEGATIVE MG/DL
HBA1C MFR BLD: 6.4 % (ref 0–5.6)
HCT VFR BLD AUTO: 43.2 % (ref 42–52)
HGB BLD-MCNC: 14.6 G/DL (ref 14–18)
IMM GRANULOCYTES # BLD AUTO: 0.07 K/UL (ref 0–0.11)
IMM GRANULOCYTES NFR BLD AUTO: 0.7 % (ref 0–0.9)
INR PPP: 0.96 (ref 0.87–1.13)
KETONES UR STRIP.AUTO-MCNC: NEGATIVE MG/DL
LEUKOCYTE ESTERASE UR QL STRIP.AUTO: NEGATIVE
LYMPHOCYTES # BLD AUTO: 1.89 K/UL (ref 1–4.8)
LYMPHOCYTES NFR BLD: 18.2 % (ref 22–41)
MCH RBC QN AUTO: 30.4 PG (ref 27–33)
MCHC RBC AUTO-ENTMCNC: 33.8 G/DL (ref 33.7–35.3)
MCV RBC AUTO: 90 FL (ref 81.4–97.8)
MICRO URNS: NORMAL
MONOCYTES # BLD AUTO: 0.91 K/UL (ref 0–0.85)
MONOCYTES NFR BLD AUTO: 8.8 % (ref 0–13.4)
NEUTROPHILS # BLD AUTO: 7.2 K/UL (ref 1.82–7.42)
NEUTROPHILS NFR BLD: 69.1 % (ref 44–72)
NITRITE UR QL STRIP.AUTO: NEGATIVE
NRBC # BLD AUTO: 0 K/UL
NRBC BLD AUTO-RTO: 0 /100 WBC
PH UR STRIP.AUTO: 5.5 [PH]
PLATELET # BLD AUTO: 286 K/UL (ref 164–446)
PMV BLD AUTO: 9.8 FL (ref 9–12.9)
POTASSIUM SERPL-SCNC: 4.4 MMOL/L (ref 3.6–5.5)
PROT SERPL-MCNC: 7.8 G/DL (ref 6–8.2)
PROT UR QL STRIP: NEGATIVE MG/DL
PROTHROMBIN TIME: 13.1 SEC (ref 12–14.6)
RBC # BLD AUTO: 4.8 M/UL (ref 4.7–6.1)
RBC UR QL AUTO: NEGATIVE
RH BLD: NORMAL
SODIUM SERPL-SCNC: 136 MMOL/L (ref 135–145)
SP GR UR STRIP.AUTO: 1.01
WBC # BLD AUTO: 10.4 K/UL (ref 4.8–10.8)

## 2017-05-31 PROCEDURE — 93970 EXTREMITY STUDY: CPT | Mod: 26 | Performed by: SURGERY

## 2017-05-31 PROCEDURE — 81003 URINALYSIS AUTO W/O SCOPE: CPT

## 2017-05-31 PROCEDURE — 86850 RBC ANTIBODY SCREEN: CPT

## 2017-05-31 PROCEDURE — 93321 DOPPLER ECHO F-UP/LMTD STD: CPT

## 2017-05-31 PROCEDURE — 80053 COMPREHEN METABOLIC PANEL: CPT

## 2017-05-31 PROCEDURE — 86900 BLOOD TYPING SEROLOGIC ABO: CPT

## 2017-05-31 PROCEDURE — 85610 PROTHROMBIN TIME: CPT

## 2017-05-31 PROCEDURE — 71020 DX-CHEST-2 VIEWS: CPT

## 2017-05-31 PROCEDURE — 83036 HEMOGLOBIN GLYCOSYLATED A1C: CPT

## 2017-05-31 PROCEDURE — 86901 BLOOD TYPING SEROLOGIC RH(D): CPT

## 2017-05-31 PROCEDURE — 85025 COMPLETE CBC W/AUTO DIFF WBC: CPT

## 2017-05-31 PROCEDURE — 93970 EXTREMITY STUDY: CPT

## 2017-05-31 PROCEDURE — 93005 ELECTROCARDIOGRAM TRACING: CPT | Performed by: THORACIC SURGERY (CARDIOTHORACIC VASCULAR SURGERY)

## 2017-05-31 PROCEDURE — 93325 DOPPLER ECHO COLOR FLOW MAPG: CPT

## 2017-05-31 PROCEDURE — 93312 ECHO TRANSESOPHAGEAL: CPT

## 2017-05-31 PROCEDURE — 85730 THROMBOPLASTIN TIME PARTIAL: CPT

## 2017-05-31 PROCEDURE — 93010 ELECTROCARDIOGRAM REPORT: CPT | Performed by: INTERNAL MEDICINE

## 2017-05-31 NOTE — CARE PLAN
Problem: Pre Op  Goal: Optimal preparation for CABG/Heart Valve surgery  Intervention: Pre Op education to patient/significant other. Provide patient [facility] Patient Guideline for Cardiac Surgery (See Pt. Ed.)  Discussed anatomy and physiology of cardiac surgery with patient and family to include pre-op regimen. Reviewed post-op expectations to include  the use of incentive spirometry with return demonstration, ventilator management, cardiac monitoring, tubes and drains, early ambulation, and expected length of stay. Also provided information on Cardiac Rehab and how to schedule an appointment. Patient and family state full understanding of all information given.  Intervention: Baseline assessment documented to include IS volume, weight, bilateral BP and peripheral pulses.  2800 mL  Intervention: NPO at midnight except cardiac medications. (No ASA, coumadin or Plavix)  Instructed patient nothing to eat or drink after midnight the night prior to scheduled surgery date.  Intervention: Shower with chlorhexidine x 2  Instructed patient to wash entire body with chlorhexedine wipes prior to bedtime the night before surgery.

## 2017-06-01 ENCOUNTER — APPOINTMENT (OUTPATIENT)
Dept: RADIOLOGY | Facility: MEDICAL CENTER | Age: 67
DRG: 236 | End: 2017-06-01
Attending: THORACIC SURGERY (CARDIOTHORACIC VASCULAR SURGERY)
Payer: COMMERCIAL

## 2017-06-01 ENCOUNTER — HOSPITAL ENCOUNTER (INPATIENT)
Facility: MEDICAL CENTER | Age: 67
LOS: 5 days | DRG: 236 | End: 2017-06-06
Attending: THORACIC SURGERY (CARDIOTHORACIC VASCULAR SURGERY) | Admitting: THORACIC SURGERY (CARDIOTHORACIC VASCULAR SURGERY)
Payer: COMMERCIAL

## 2017-06-01 DIAGNOSIS — I25.10 CORONARY ARTERY DISEASE INVOLVING NATIVE CORONARY ARTERY OF NATIVE HEART WITHOUT ANGINA PECTORIS: ICD-10-CM

## 2017-06-01 LAB
ABO GROUP BLD: NORMAL
ACT BLD: 137 SEC (ref 74–137)
ACT BLD: 147 SEC (ref 74–137)
ACT BLD: 507 SEC (ref 74–137)
ACT BLD: 513 SEC (ref 74–137)
ACT BLD: 574 SEC (ref 74–137)
ACT BLD: 708 SEC (ref 74–137)
APTT PPP: 35.6 SEC (ref 24.7–36)
BASE EXCESS BLDA CALC-SCNC: -2 MMOL/L (ref -4–3)
BASE EXCESS BLDA CALC-SCNC: -2 MMOL/L (ref -4–3)
BASE EXCESS BLDA CALC-SCNC: -3 MMOL/L (ref -4–3)
BASE EXCESS BLDA CALC-SCNC: -3 MMOL/L (ref -4–3)
BASE EXCESS BLDA CALC-SCNC: -4 MMOL/L (ref -4–3)
BASE EXCESS BLDA CALC-SCNC: -6 MMOL/L (ref -4–3)
BASE EXCESS BLDA CALC-SCNC: -7 MMOL/L (ref -4–3)
BASE EXCESS BLDA CALC-SCNC: 1 MMOL/L (ref -4–3)
BASE EXCESS BLDV CALC-SCNC: -5 MMOL/L (ref -4–3)
BODY TEMPERATURE: ABNORMAL DEGREES
CA-I BLD ISE-SCNC: 0.98 MMOL/L (ref 1.1–1.3)
CA-I BLD ISE-SCNC: 1 MMOL/L (ref 1.1–1.3)
CA-I BLD ISE-SCNC: 1.01 MMOL/L (ref 1.1–1.3)
CA-I BLD ISE-SCNC: 1.02 MMOL/L (ref 1.1–1.3)
CA-I BLD ISE-SCNC: 1.02 MMOL/L (ref 1.1–1.3)
CA-I BLD ISE-SCNC: 1.05 MMOL/L (ref 1.1–1.3)
CA-I BLD ISE-SCNC: 1.06 MMOL/L (ref 1.1–1.3)
CA-I BLD ISE-SCNC: 1.19 MMOL/L (ref 1.1–1.3)
CA-I BLD ISE-SCNC: 1.24 MMOL/L (ref 1.1–1.3)
CFT BLD TEG: 6.2 MIN (ref 5–10)
CFT P HPASE BLD TEG: 5.9 MIN (ref 5–10)
CLOT ANGLE BLD TEG: 66.8 DEGREES (ref 53–72)
CLOT ANGLE P HPASE BLD TEG: 70.2 DEGREES (ref 53–72)
CLOT INIT P HPASE BLD TEG: 1.3 MIN (ref 1–3)
CLOT LYSIS 30M P MA LENFR BLD TEG: 0 % (ref 0–8)
CLOT LYSIS 30M P MA LENFR BLD TEG: 0 % (ref 0–8)
CO2 BLDA-SCNC: 21 MMOL/L (ref 20–33)
CO2 BLDA-SCNC: 22 MMOL/L (ref 20–33)
CO2 BLDA-SCNC: 22 MMOL/L (ref 20–33)
CO2 BLDA-SCNC: 23 MMOL/L (ref 20–33)
CO2 BLDA-SCNC: 23 MMOL/L (ref 20–33)
CO2 BLDA-SCNC: 24 MMOL/L (ref 20–33)
CO2 BLDA-SCNC: 25 MMOL/L (ref 20–33)
CO2 BLDA-SCNC: 27 MMOL/L (ref 20–33)
CO2 BLDV-SCNC: 22 MMOL/L (ref 20–33)
CT.EXTRINSIC BLD ROTEM: 1.7 MIN (ref 1–3)
EKG IMPRESSION: NORMAL
GLUCOSE BLD-MCNC: 107 MG/DL (ref 65–99)
GLUCOSE BLD-MCNC: 127 MG/DL (ref 65–99)
GLUCOSE BLD-MCNC: 136 MG/DL (ref 65–99)
GLUCOSE BLD-MCNC: 149 MG/DL (ref 65–99)
GLUCOSE BLD-MCNC: 169 MG/DL (ref 65–99)
GLUCOSE BLD-MCNC: 180 MG/DL (ref 65–99)
GLUCOSE BLD-MCNC: 186 MG/DL (ref 65–99)
GLUCOSE BLD-MCNC: 205 MG/DL (ref 65–99)
GLUCOSE BLD-MCNC: 214 MG/DL (ref 65–99)
GLUCOSE BLD-MCNC: 219 MG/DL (ref 65–99)
GLUCOSE BLD-MCNC: 224 MG/DL (ref 65–99)
GLUCOSE BLD-MCNC: 227 MG/DL (ref 65–99)
GLUCOSE BLD-MCNC: 238 MG/DL (ref 65–99)
GLUCOSE BLD-MCNC: 73 MG/DL (ref 65–99)
GLUCOSE BLD-MCNC: 84 MG/DL (ref 65–99)
GLUCOSE BLD-MCNC: 84 MG/DL (ref 65–99)
GLUCOSE BLD-MCNC: 88 MG/DL (ref 65–99)
HCO3 BLDA-SCNC: 19.8 MMOL/L (ref 17–25)
HCO3 BLDA-SCNC: 20.5 MMOL/L (ref 17–25)
HCO3 BLDA-SCNC: 20.6 MMOL/L (ref 17–25)
HCO3 BLDA-SCNC: 22.1 MMOL/L (ref 17–25)
HCO3 BLDA-SCNC: 22.2 MMOL/L (ref 17–25)
HCO3 BLDA-SCNC: 22.3 MMOL/L (ref 17–25)
HCO3 BLDA-SCNC: 22.4 MMOL/L (ref 17–25)
HCO3 BLDA-SCNC: 22.5 MMOL/L (ref 17–25)
HCO3 BLDA-SCNC: 22.7 MMOL/L (ref 17–25)
HCO3 BLDA-SCNC: 23.1 MMOL/L (ref 17–25)
HCO3 BLDA-SCNC: 24.1 MMOL/L (ref 17–25)
HCO3 BLDA-SCNC: 26 MMOL/L (ref 17–25)
HCO3 BLDV-SCNC: 20.9 MMOL/L (ref 24–28)
HCT VFR BLD CALC: 24 % (ref 42–52)
HCT VFR BLD CALC: 25 % (ref 42–52)
HCT VFR BLD CALC: 26 % (ref 42–52)
HCT VFR BLD CALC: 27 % (ref 42–52)
HCT VFR BLD CALC: 33 % (ref 42–52)
HCT VFR BLD CALC: 35 % (ref 42–52)
HCT VFR BLD CALC: 37 % (ref 42–52)
HGB BLD-MCNC: 11.2 G/DL (ref 14–18)
HGB BLD-MCNC: 11.9 G/DL (ref 14–18)
HGB BLD-MCNC: 12.6 G/DL (ref 14–18)
HGB BLD-MCNC: 8.2 G/DL (ref 14–18)
HGB BLD-MCNC: 8.5 G/DL (ref 14–18)
HGB BLD-MCNC: 8.8 G/DL (ref 14–18)
HGB BLD-MCNC: 9.2 G/DL (ref 14–18)
INR PPP: 1.31 (ref 0.87–1.13)
LACTATE BLD-SCNC: 1.1 MMOL/L (ref 0.5–2)
MAGNESIUM SERPL-MCNC: 2 MG/DL (ref 1.5–2.5)
MCF BLD TEG: 66.9 MM (ref 50–70)
MCF P HPASE BLD TEG: 65.8 MM (ref 50–70)
O2/TOTAL GAS SETTING VFR VENT: 100 %
O2/TOTAL GAS SETTING VFR VENT: 40 %
O2/TOTAL GAS SETTING VFR VENT: 45 %
O2/TOTAL GAS SETTING VFR VENT: 50 %
O2/TOTAL GAS SETTING VFR VENT: 60 %
O2/TOTAL GAS SETTING VFR VENT: 60 %
O2/TOTAL GAS SETTING VFR VENT: 80 %
PCO2 BLDA: 35.9 MMHG (ref 26–37)
PCO2 BLDA: 39.4 MMHG (ref 26–37)
PCO2 BLDA: 40.4 MMHG (ref 26–37)
PCO2 BLDA: 41.4 MMHG (ref 26–37)
PCO2 BLDA: 42.2 MMHG (ref 26–37)
PCO2 BLDA: 42.6 MMHG (ref 26–37)
PCO2 BLDA: 43.3 MMHG (ref 26–37)
PCO2 BLDA: 44 MMHG (ref 26–37)
PCO2 BLDA: 44.8 MMHG (ref 26–37)
PCO2 BLDA: 45.3 MMHG (ref 26–37)
PCO2 BLDA: 45.5 MMHG (ref 26–37)
PCO2 BLDA: 47 MMHG (ref 26–37)
PCO2 BLDV: 40.8 MMHG (ref 41–51)
PCO2 TEMP ADJ BLDA: 35.6 MMHG (ref 26–37)
PCO2 TEMP ADJ BLDA: 38.5 MMHG (ref 26–37)
PCO2 TEMP ADJ BLDA: 40.2 MMHG (ref 26–37)
PCO2 TEMP ADJ BLDA: 40.4 MMHG (ref 26–37)
PCO2 TEMP ADJ BLDA: 42.2 MMHG (ref 26–37)
PCO2 TEMP ADJ BLDA: 42.6 MMHG (ref 26–37)
PCO2 TEMP ADJ BLDA: 43.3 MMHG (ref 26–37)
PCO2 TEMP ADJ BLDA: 43.8 MMHG (ref 26–37)
PCO2 TEMP ADJ BLDA: 44 MMHG (ref 26–37)
PCO2 TEMP ADJ BLDA: 44.8 MMHG (ref 26–37)
PCO2 TEMP ADJ BLDA: 45.3 MMHG (ref 26–37)
PCO2 TEMP ADJ BLDA: 45.4 MMHG (ref 26–37)
PCO2 TEMP ADJ BLDV: 40.8 MMHG (ref 41–51)
PH BLDA: 7.27 [PH] (ref 7.4–7.5)
PH BLDA: 7.28 [PH] (ref 7.4–7.5)
PH BLDA: 7.29 [PH] (ref 7.4–7.5)
PH BLDA: 7.3 [PH] (ref 7.4–7.5)
PH BLDA: 7.3 [PH] (ref 7.4–7.5)
PH BLDA: 7.33 [PH] (ref 7.4–7.5)
PH BLDA: 7.33 [PH] (ref 7.4–7.5)
PH BLDA: 7.35 [PH] (ref 7.4–7.5)
PH BLDA: 7.35 [PH] (ref 7.4–7.5)
PH BLDA: 7.36 [PH] (ref 7.4–7.5)
PH BLDA: 7.38 [PH] (ref 7.4–7.5)
PH BLDA: 7.39 [PH] (ref 7.4–7.5)
PH BLDV: 7.32 [PH] (ref 7.31–7.45)
PH TEMP ADJ BLDA: 7.27 [PH] (ref 7.4–7.5)
PH TEMP ADJ BLDA: 7.28 [PH] (ref 7.4–7.5)
PH TEMP ADJ BLDA: 7.3 [PH] (ref 7.4–7.5)
PH TEMP ADJ BLDA: 7.3 [PH] (ref 7.4–7.5)
PH TEMP ADJ BLDA: 7.32 [PH] (ref 7.4–7.5)
PH TEMP ADJ BLDA: 7.33 [PH] (ref 7.4–7.5)
PH TEMP ADJ BLDA: 7.34 [PH] (ref 7.4–7.5)
PH TEMP ADJ BLDA: 7.35 [PH] (ref 7.4–7.5)
PH TEMP ADJ BLDA: 7.35 [PH] (ref 7.4–7.5)
PH TEMP ADJ BLDA: 7.37 [PH] (ref 7.4–7.5)
PH TEMP ADJ BLDA: 7.38 [PH] (ref 7.4–7.5)
PH TEMP ADJ BLDA: 7.39 [PH] (ref 7.4–7.5)
PH TEMP ADJ BLDV: 7.32 [PH] (ref 7.31–7.45)
PLATELET # BLD AUTO: 187 K/UL (ref 164–446)
PLATELET # BLD AUTO: 225 K/UL (ref 164–446)
PO2 BLDA: 104 MMHG (ref 64–87)
PO2 BLDA: 128 MMHG (ref 64–87)
PO2 BLDA: 205 MMHG (ref 64–87)
PO2 BLDA: 247 MMHG (ref 64–87)
PO2 BLDA: 272 MMHG (ref 64–87)
PO2 BLDA: 273 MMHG (ref 64–87)
PO2 BLDA: 292 MMHG (ref 64–87)
PO2 BLDA: 300 MMHG (ref 64–87)
PO2 BLDA: 377 MMHG (ref 64–87)
PO2 BLDA: 71 MMHG (ref 64–87)
PO2 BLDA: 72 MMHG (ref 64–87)
PO2 BLDA: 85 MMHG (ref 64–87)
PO2 BLDV: 46 MMHG (ref 25–40)
PO2 TEMP ADJ BLDA: 128 MMHG (ref 64–87)
PO2 TEMP ADJ BLDA: 201 MMHG (ref 64–87)
PO2 TEMP ADJ BLDA: 247 MMHG (ref 64–87)
PO2 TEMP ADJ BLDA: 272 MMHG (ref 64–87)
PO2 TEMP ADJ BLDA: 273 MMHG (ref 64–87)
PO2 TEMP ADJ BLDA: 292 MMHG (ref 64–87)
PO2 TEMP ADJ BLDA: 300 MMHG (ref 64–87)
PO2 TEMP ADJ BLDA: 377 MMHG (ref 64–87)
PO2 TEMP ADJ BLDA: 69 MMHG (ref 64–87)
PO2 TEMP ADJ BLDA: 71 MMHG (ref 64–87)
PO2 TEMP ADJ BLDA: 82 MMHG (ref 64–87)
PO2 TEMP ADJ BLDA: 99 MMHG (ref 64–87)
PO2 TEMP ADJ BLDV: 46 MMHG (ref 25–40)
POTASSIUM BLD-SCNC: 4.7 MMOL/L (ref 3.6–5.5)
POTASSIUM BLD-SCNC: 4.8 MMOL/L (ref 3.6–5.5)
POTASSIUM BLD-SCNC: 5.2 MMOL/L (ref 3.6–5.5)
POTASSIUM BLD-SCNC: 5.2 MMOL/L (ref 3.6–5.5)
POTASSIUM BLD-SCNC: 5.8 MMOL/L (ref 3.6–5.5)
POTASSIUM BLD-SCNC: 5.8 MMOL/L (ref 3.6–5.5)
POTASSIUM BLD-SCNC: 6 MMOL/L (ref 3.6–5.5)
POTASSIUM BLD-SCNC: 6.6 MMOL/L (ref 3.6–5.5)
POTASSIUM BLD-SCNC: 6.8 MMOL/L (ref 3.6–5.5)
POTASSIUM SERPL-SCNC: 4.3 MMOL/L (ref 3.6–5.5)
PROTHROMBIN TIME: 16.7 SEC (ref 12–14.6)
SAO2 % BLDA: 100 % (ref 93–99)
SAO2 % BLDA: 94 % (ref 93–99)
SAO2 % BLDA: 94 % (ref 93–99)
SAO2 % BLDA: 96 % (ref 93–99)
SAO2 % BLDA: 97 % (ref 93–99)
SAO2 % BLDA: 99 % (ref 93–99)
SAO2 % BLDV: 78 %
SODIUM BLD-SCNC: 133 MMOL/L (ref 135–145)
SODIUM BLD-SCNC: 135 MMOL/L (ref 135–145)
SODIUM BLD-SCNC: 135 MMOL/L (ref 135–145)
SODIUM BLD-SCNC: 137 MMOL/L (ref 135–145)
SODIUM BLD-SCNC: 138 MMOL/L (ref 135–145)
SODIUM BLD-SCNC: 139 MMOL/L (ref 135–145)
SODIUM BLD-SCNC: 140 MMOL/L (ref 135–145)
SPECIMEN DRAWN FROM PATIENT: ABNORMAL
TEG ALGORITHM TGALG: NORMAL

## 2017-06-01 PROCEDURE — C1729 CATH, DRAINAGE: HCPCS | Performed by: THORACIC SURGERY (CARDIOTHORACIC VASCULAR SURGERY)

## 2017-06-01 PROCEDURE — 160042 HCHG SURGERY MINUTES - EA ADDL 1 MIN LEVEL 5: Performed by: THORACIC SURGERY (CARDIOTHORACIC VASCULAR SURGERY)

## 2017-06-01 PROCEDURE — 700111 HCHG RX REV CODE 636 W/ 250 OVERRIDE (IP): Performed by: THORACIC SURGERY (CARDIOTHORACIC VASCULAR SURGERY)

## 2017-06-01 PROCEDURE — 85384 FIBRINOGEN ACTIVITY: CPT

## 2017-06-01 PROCEDURE — C1751 CATH, INF, PER/CENT/MIDLINE: HCPCS | Performed by: THORACIC SURGERY (CARDIOTHORACIC VASCULAR SURGERY)

## 2017-06-01 PROCEDURE — 93005 ELECTROCARDIOGRAM TRACING: CPT | Performed by: NURSE PRACTITIONER

## 2017-06-01 PROCEDURE — 85347 COAGULATION TIME ACTIVATED: CPT | Mod: 91

## 2017-06-01 PROCEDURE — 700101 HCHG RX REV CODE 250

## 2017-06-01 PROCEDURE — 160031 HCHG SURGERY MINUTES - 1ST 30 MINS LEVEL 5: Performed by: THORACIC SURGERY (CARDIOTHORACIC VASCULAR SURGERY)

## 2017-06-01 PROCEDURE — 500896 HCHG PACK, VASCULAR (FOR ROOM 10): Performed by: THORACIC SURGERY (CARDIOTHORACIC VASCULAR SURGERY)

## 2017-06-01 PROCEDURE — A6402 STERILE GAUZE <= 16 SQ IN: HCPCS | Performed by: THORACIC SURGERY (CARDIOTHORACIC VASCULAR SURGERY)

## 2017-06-01 PROCEDURE — 700111 HCHG RX REV CODE 636 W/ 250 OVERRIDE (IP)

## 2017-06-01 PROCEDURE — 82330 ASSAY OF CALCIUM: CPT | Mod: 91

## 2017-06-01 PROCEDURE — 502654 HCHG INSERT, OFF-PUMP: Performed by: THORACIC SURGERY (CARDIOTHORACIC VASCULAR SURGERY)

## 2017-06-01 PROCEDURE — 160048 HCHG OR STATISTICAL LEVEL 1-5: Performed by: THORACIC SURGERY (CARDIOTHORACIC VASCULAR SURGERY)

## 2017-06-01 PROCEDURE — 500824 HCHG MISTER BLOWER, CTS: Performed by: THORACIC SURGERY (CARDIOTHORACIC VASCULAR SURGERY)

## 2017-06-01 PROCEDURE — 501183 HCHG PUNCH, AORTIC #4: Performed by: THORACIC SURGERY (CARDIOTHORACIC VASCULAR SURGERY)

## 2017-06-01 PROCEDURE — 700102 HCHG RX REV CODE 250 W/ 637 OVERRIDE(OP): Performed by: NURSE PRACTITIONER

## 2017-06-01 PROCEDURE — 503000 HCHG SUTURE, OHS: Performed by: THORACIC SURGERY (CARDIOTHORACIC VASCULAR SURGERY)

## 2017-06-01 PROCEDURE — 82803 BLOOD GASES ANY COMBINATION: CPT | Mod: 91

## 2017-06-01 PROCEDURE — 700111 HCHG RX REV CODE 636 W/ 250 OVERRIDE (IP): Performed by: NURSE PRACTITIONER

## 2017-06-01 PROCEDURE — 85730 THROMBOPLASTIN TIME PARTIAL: CPT

## 2017-06-01 PROCEDURE — A9270 NON-COVERED ITEM OR SERVICE: HCPCS

## 2017-06-01 PROCEDURE — 500112 HCHG BONE WAX: Performed by: THORACIC SURGERY (CARDIOTHORACIC VASCULAR SURGERY)

## 2017-06-01 PROCEDURE — 700105 HCHG RX REV CODE 258: Performed by: THORACIC SURGERY (CARDIOTHORACIC VASCULAR SURGERY)

## 2017-06-01 PROCEDURE — 5A1221Z PERFORMANCE OF CARDIAC OUTPUT, CONTINUOUS: ICD-10-PCS | Performed by: THORACIC SURGERY (CARDIOTHORACIC VASCULAR SURGERY)

## 2017-06-01 PROCEDURE — 500294 HCHG CLAMP, BULLDOG 1/2 FORCE BLUE: Performed by: THORACIC SURGERY (CARDIOTHORACIC VASCULAR SURGERY)

## 2017-06-01 PROCEDURE — 500385 HCHG DRAIN, PLEUROVAC ADUL: Performed by: THORACIC SURGERY (CARDIOTHORACIC VASCULAR SURGERY)

## 2017-06-01 PROCEDURE — 85049 AUTOMATED PLATELET COUNT: CPT | Mod: 91

## 2017-06-01 PROCEDURE — C9248 INJ, CLEVIDIPINE BUTYRATE: HCPCS | Performed by: NURSE PRACTITIONER

## 2017-06-01 PROCEDURE — C1894 INTRO/SHEATH, NON-LASER: HCPCS | Performed by: THORACIC SURGERY (CARDIOTHORACIC VASCULAR SURGERY)

## 2017-06-01 PROCEDURE — 85610 PROTHROMBIN TIME: CPT

## 2017-06-01 PROCEDURE — 502627 HCHG HEMOSTAT, SURGICEL 4X4: Performed by: THORACIC SURGERY (CARDIOTHORACIC VASCULAR SURGERY)

## 2017-06-01 PROCEDURE — B24BZZ4 ULTRASONOGRAPHY OF HEART WITH AORTA, TRANSESOPHAGEAL: ICD-10-PCS | Performed by: THORACIC SURGERY (CARDIOTHORACIC VASCULAR SURGERY)

## 2017-06-01 PROCEDURE — C1898 LEAD, PMKR, OTHER THAN TRANS: HCPCS | Performed by: THORACIC SURGERY (CARDIOTHORACIC VASCULAR SURGERY)

## 2017-06-01 PROCEDURE — 700101 HCHG RX REV CODE 250: Performed by: NURSE PRACTITIONER

## 2017-06-01 PROCEDURE — 02HV33Z INSERTION OF INFUSION DEVICE INTO SUPERIOR VENA CAVA, PERCUTANEOUS APPROACH: ICD-10-PCS | Performed by: THORACIC SURGERY (CARDIOTHORACIC VASCULAR SURGERY)

## 2017-06-01 PROCEDURE — 94667 MNPJ CHEST WALL 1ST: CPT

## 2017-06-01 PROCEDURE — P9047 ALBUMIN (HUMAN), 25%, 50ML: HCPCS

## 2017-06-01 PROCEDURE — 700102 HCHG RX REV CODE 250 W/ 637 OVERRIDE(OP): Performed by: THORACIC SURGERY (CARDIOTHORACIC VASCULAR SURGERY)

## 2017-06-01 PROCEDURE — 500043 HCHG BAG-A-JET: Performed by: THORACIC SURGERY (CARDIOTHORACIC VASCULAR SURGERY)

## 2017-06-01 PROCEDURE — 500734 HCHG INSERT, STEALTH: Performed by: THORACIC SURGERY (CARDIOTHORACIC VASCULAR SURGERY)

## 2017-06-01 PROCEDURE — 503001 HCHG PERFUSION: Performed by: THORACIC SURGERY (CARDIOTHORACIC VASCULAR SURGERY)

## 2017-06-01 PROCEDURE — 71010 DX-CHEST-PORTABLE (1 VIEW): CPT

## 2017-06-01 PROCEDURE — 83605 ASSAY OF LACTIC ACID: CPT

## 2017-06-01 PROCEDURE — 700101 HCHG RX REV CODE 250: Performed by: THORACIC SURGERY (CARDIOTHORACIC VASCULAR SURGERY)

## 2017-06-01 PROCEDURE — C9248 INJ, CLEVIDIPINE BUTYRATE: HCPCS

## 2017-06-01 PROCEDURE — 85014 HEMATOCRIT: CPT | Mod: 91

## 2017-06-01 PROCEDURE — 06BP4ZZ EXCISION OF RIGHT SAPHENOUS VEIN, PERCUTANEOUS ENDOSCOPIC APPROACH: ICD-10-PCS | Performed by: THORACIC SURGERY (CARDIOTHORACIC VASCULAR SURGERY)

## 2017-06-01 PROCEDURE — 770022 HCHG ROOM/CARE - ICU (200)

## 2017-06-01 PROCEDURE — 94002 VENT MGMT INPAT INIT DAY: CPT

## 2017-06-01 PROCEDURE — 500074 HCHG BLADE, BEAVER (OHS): Performed by: THORACIC SURGERY (CARDIOTHORACIC VASCULAR SURGERY)

## 2017-06-01 PROCEDURE — 82947 ASSAY GLUCOSE BLOOD QUANT: CPT | Mod: 91

## 2017-06-01 PROCEDURE — 83735 ASSAY OF MAGNESIUM: CPT

## 2017-06-01 PROCEDURE — 501519 HCHG SUTURE, E PACK: Performed by: THORACIC SURGERY (CARDIOTHORACIC VASCULAR SURGERY)

## 2017-06-01 PROCEDURE — A9270 NON-COVERED ITEM OR SERVICE: HCPCS | Performed by: NURSE PRACTITIONER

## 2017-06-01 PROCEDURE — 84132 ASSAY OF SERUM POTASSIUM: CPT | Mod: 91

## 2017-06-01 PROCEDURE — 700105 HCHG RX REV CODE 258: Performed by: NURSE PRACTITIONER

## 2017-06-01 PROCEDURE — 700102 HCHG RX REV CODE 250 W/ 637 OVERRIDE(OP)

## 2017-06-01 PROCEDURE — 93010 ELECTROCARDIOGRAM REPORT: CPT | Performed by: INTERNAL MEDICINE

## 2017-06-01 PROCEDURE — 700105 HCHG RX REV CODE 258

## 2017-06-01 PROCEDURE — 500815 HCHG LOCATORS, AC VEIN GRAFT: Performed by: THORACIC SURGERY (CARDIOTHORACIC VASCULAR SURGERY)

## 2017-06-01 PROCEDURE — 502240 HCHG MISC OR SUPPLY RC 0272: Performed by: THORACIC SURGERY (CARDIOTHORACIC VASCULAR SURGERY)

## 2017-06-01 PROCEDURE — 03HB33Z INSERTION OF INFUSION DEVICE INTO RIGHT RADIAL ARTERY, PERCUTANEOUS APPROACH: ICD-10-PCS | Performed by: THORACIC SURGERY (CARDIOTHORACIC VASCULAR SURGERY)

## 2017-06-01 PROCEDURE — 501745 HCHG WIRE, SURGICAL STEEL: Performed by: THORACIC SURGERY (CARDIOTHORACIC VASCULAR SURGERY)

## 2017-06-01 PROCEDURE — 500890 HCHG PACK, OPEN HEART: Performed by: THORACIC SURGERY (CARDIOTHORACIC VASCULAR SURGERY)

## 2017-06-01 PROCEDURE — 84295 ASSAY OF SERUM SODIUM: CPT | Mod: 91

## 2017-06-01 PROCEDURE — C1725 CATH, TRANSLUMIN NON-LASER: HCPCS | Performed by: THORACIC SURGERY (CARDIOTHORACIC VASCULAR SURGERY)

## 2017-06-01 PROCEDURE — 02100Z9 BYPASS CORONARY ARTERY, ONE ARTERY FROM LEFT INTERNAL MAMMARY, OPEN APPROACH: ICD-10-PCS | Performed by: THORACIC SURGERY (CARDIOTHORACIC VASCULAR SURGERY)

## 2017-06-01 PROCEDURE — 500767 HCHG KIT, ENDOSCOPIC VEIN HARVEST: Performed by: THORACIC SURGERY (CARDIOTHORACIC VASCULAR SURGERY)

## 2017-06-01 PROCEDURE — 160009 HCHG ANES TIME/MIN: Performed by: THORACIC SURGERY (CARDIOTHORACIC VASCULAR SURGERY)

## 2017-06-01 PROCEDURE — 37799 UNLISTED PX VASCULAR SURGERY: CPT

## 2017-06-01 PROCEDURE — 85576 BLOOD PLATELET AGGREGATION: CPT | Mod: 91

## 2017-06-01 PROCEDURE — 500359 HCHG DILATORS, PARSONNET (OHS): Performed by: THORACIC SURGERY (CARDIOTHORACIC VASCULAR SURGERY)

## 2017-06-01 PROCEDURE — 501445 HCHG STAPLER, SKIN DISP: Performed by: THORACIC SURGERY (CARDIOTHORACIC VASCULAR SURGERY)

## 2017-06-01 PROCEDURE — 82962 GLUCOSE BLOOD TEST: CPT

## 2017-06-01 PROCEDURE — 021009W BYPASS CORONARY ARTERY, ONE ARTERY FROM AORTA WITH AUTOLOGOUS VENOUS TISSUE, OPEN APPROACH: ICD-10-PCS | Performed by: THORACIC SURGERY (CARDIOTHORACIC VASCULAR SURGERY)

## 2017-06-01 PROCEDURE — 500053 HCHG BANDAGE, ELASTIC 4: Performed by: THORACIC SURGERY (CARDIOTHORACIC VASCULAR SURGERY)

## 2017-06-01 DEVICE — LOCATORS AC VEIN GRAFT (OHS) - 10/BX  SCANLAN #250-1001-83: Type: IMPLANTABLE DEVICE | Status: FUNCTIONAL

## 2017-06-01 RX ORDER — OXYCODONE HYDROCHLORIDE 5 MG/1
5 TABLET ORAL
Status: DISCONTINUED | OUTPATIENT
Start: 2017-06-01 | End: 2017-06-06 | Stop reason: HOSPADM

## 2017-06-01 RX ORDER — ACETAMINOPHEN 650 MG/1
650 SUPPOSITORY RECTAL EVERY 4 HOURS PRN
Status: DISCONTINUED | OUTPATIENT
Start: 2017-06-01 | End: 2017-06-06 | Stop reason: HOSPADM

## 2017-06-01 RX ORDER — PAPAVERINE HYDROCHLORIDE 30 MG/ML
INJECTION INTRAMUSCULAR; INTRAVENOUS
Status: DISCONTINUED | OUTPATIENT
Start: 2017-06-01 | End: 2017-06-01 | Stop reason: HOSPADM

## 2017-06-01 RX ORDER — LIDOCAINE HYDROCHLORIDE 10 MG/ML
INJECTION, SOLUTION INFILTRATION; PERINEURAL
Status: COMPLETED
Start: 2017-06-01 | End: 2017-06-01

## 2017-06-01 RX ORDER — SODIUM CHLORIDE, SODIUM LACTATE, POTASSIUM CHLORIDE, CALCIUM CHLORIDE 600; 310; 30; 20 MG/100ML; MG/100ML; MG/100ML; MG/100ML
INJECTION, SOLUTION INTRAVENOUS
Status: COMPLETED
Start: 2017-06-01 | End: 2017-06-01

## 2017-06-01 RX ORDER — DIPHENHYDRAMINE HCL 25 MG
25 TABLET ORAL
Status: DISCONTINUED | OUTPATIENT
Start: 2017-06-01 | End: 2017-06-06 | Stop reason: HOSPADM

## 2017-06-01 RX ORDER — DOCUSATE SODIUM 100 MG/1
100 CAPSULE, LIQUID FILLED ORAL EVERY MORNING
Status: DISCONTINUED | OUTPATIENT
Start: 2017-06-01 | End: 2017-06-06 | Stop reason: HOSPADM

## 2017-06-01 RX ORDER — ASPIRIN 81 MG/1
81 TABLET, CHEWABLE ORAL DAILY
Status: DISCONTINUED | OUTPATIENT
Start: 2017-06-02 | End: 2017-06-06 | Stop reason: HOSPADM

## 2017-06-01 RX ORDER — ALUMINA, MAGNESIA, AND SIMETHICONE 2400; 2400; 240 MG/30ML; MG/30ML; MG/30ML
30 SUSPENSION ORAL EVERY 4 HOURS PRN
Status: DISCONTINUED | OUTPATIENT
Start: 2017-06-01 | End: 2017-06-06 | Stop reason: HOSPADM

## 2017-06-01 RX ORDER — ENEMA 19; 7 G/133ML; G/133ML
1 ENEMA RECTAL
Status: DISCONTINUED | OUTPATIENT
Start: 2017-06-01 | End: 2017-06-06 | Stop reason: HOSPADM

## 2017-06-01 RX ORDER — AMOXICILLIN 250 MG
1 CAPSULE ORAL
Status: DISCONTINUED | OUTPATIENT
Start: 2017-06-01 | End: 2017-06-06 | Stop reason: HOSPADM

## 2017-06-01 RX ORDER — HYDROCODONE BITARTRATE AND ACETAMINOPHEN 5; 325 MG/1; MG/1
1-2 TABLET ORAL EVERY 6 HOURS PRN
Status: DISCONTINUED | OUTPATIENT
Start: 2017-06-01 | End: 2017-06-06 | Stop reason: HOSPADM

## 2017-06-01 RX ORDER — LACTULOSE 20 G/30ML
30 SOLUTION ORAL
Status: DISCONTINUED | OUTPATIENT
Start: 2017-06-01 | End: 2017-06-06 | Stop reason: HOSPADM

## 2017-06-01 RX ORDER — SODIUM CHLORIDE, SODIUM GLUCONATE, SODIUM ACETATE, POTASSIUM CHLORIDE AND MAGNESIUM CHLORIDE 526; 502; 368; 37; 30 MG/100ML; MG/100ML; MG/100ML; MG/100ML; MG/100ML
INJECTION, SOLUTION INTRAVENOUS PRN
Status: DISCONTINUED | OUTPATIENT
Start: 2017-06-01 | End: 2017-06-02 | Stop reason: HOSPADM

## 2017-06-01 RX ORDER — SODIUM CHLORIDE 9 MG/ML
INJECTION, SOLUTION INTRAVENOUS
Status: COMPLETED
Start: 2017-06-01 | End: 2017-06-01

## 2017-06-01 RX ORDER — TRAMADOL HYDROCHLORIDE 50 MG/1
50 TABLET ORAL EVERY 4 HOURS PRN
Status: DISCONTINUED | OUTPATIENT
Start: 2017-06-01 | End: 2017-06-06 | Stop reason: HOSPADM

## 2017-06-01 RX ORDER — MORPHINE SULFATE 4 MG/ML
4 INJECTION, SOLUTION INTRAMUSCULAR; INTRAVENOUS
Status: DISCONTINUED | OUTPATIENT
Start: 2017-06-01 | End: 2017-06-02 | Stop reason: HOSPADM

## 2017-06-01 RX ORDER — SODIUM CHLORIDE 9 MG/ML
INJECTION, SOLUTION INTRAVENOUS CONTINUOUS
Status: DISCONTINUED | OUTPATIENT
Start: 2017-06-01 | End: 2017-06-06 | Stop reason: HOSPADM

## 2017-06-01 RX ORDER — AMOXICILLIN 250 MG
1 CAPSULE ORAL NIGHTLY
Status: DISCONTINUED | OUTPATIENT
Start: 2017-06-01 | End: 2017-06-06 | Stop reason: HOSPADM

## 2017-06-01 RX ORDER — MIDAZOLAM HYDROCHLORIDE 1 MG/ML
.5-2 INJECTION INTRAMUSCULAR; INTRAVENOUS
Status: DISCONTINUED | OUTPATIENT
Start: 2017-06-01 | End: 2017-06-02 | Stop reason: HOSPADM

## 2017-06-01 RX ORDER — CARVEDILOL 3.12 MG/1
3.12 TABLET ORAL 2 TIMES DAILY WITH MEALS
Status: DISCONTINUED | OUTPATIENT
Start: 2017-06-01 | End: 2017-06-03

## 2017-06-01 RX ORDER — ATORVASTATIN CALCIUM 40 MG/1
40 TABLET, FILM COATED ORAL
Status: DISCONTINUED | OUTPATIENT
Start: 2017-06-01 | End: 2017-06-06 | Stop reason: HOSPADM

## 2017-06-01 RX ORDER — LIDOCAINE HYDROCHLORIDE 10 MG/ML
0.5 INJECTION, SOLUTION INFILTRATION; PERINEURAL
Status: COMPLETED | OUTPATIENT
Start: 2017-06-01 | End: 2017-06-01

## 2017-06-01 RX ORDER — OXYCODONE HYDROCHLORIDE 10 MG/1
10 TABLET ORAL
Status: DISCONTINUED | OUTPATIENT
Start: 2017-06-01 | End: 2017-06-06 | Stop reason: HOSPADM

## 2017-06-01 RX ORDER — PROMETHAZINE HYDROCHLORIDE 25 MG/1
25 SUPPOSITORY RECTAL EVERY 6 HOURS PRN
Status: DISCONTINUED | OUTPATIENT
Start: 2017-06-01 | End: 2017-06-02

## 2017-06-01 RX ORDER — LIDOCAINE AND PRILOCAINE 25; 25 MG/G; MG/G
1 CREAM TOPICAL
Status: COMPLETED | OUTPATIENT
Start: 2017-06-01 | End: 2017-06-01

## 2017-06-01 RX ORDER — NITROGLYCERIN 20 MG/100ML
0-100 INJECTION INTRAVENOUS CONTINUOUS
Status: DISCONTINUED | OUTPATIENT
Start: 2017-06-01 | End: 2017-06-02

## 2017-06-01 RX ORDER — BISACODYL 10 MG
10 SUPPOSITORY, RECTAL RECTAL
Status: DISCONTINUED | OUTPATIENT
Start: 2017-06-01 | End: 2017-06-06 | Stop reason: HOSPADM

## 2017-06-01 RX ORDER — ACETAMINOPHEN 325 MG/1
650 TABLET ORAL EVERY 4 HOURS PRN
Status: DISCONTINUED | OUTPATIENT
Start: 2017-06-01 | End: 2017-06-06 | Stop reason: HOSPADM

## 2017-06-01 RX ORDER — CLOPIDOGREL BISULFATE 75 MG/1
75 TABLET ORAL DAILY
Status: DISCONTINUED | OUTPATIENT
Start: 2017-06-02 | End: 2017-06-06 | Stop reason: HOSPADM

## 2017-06-01 RX ORDER — ONDANSETRON 2 MG/ML
4 INJECTION INTRAMUSCULAR; INTRAVENOUS EVERY 6 HOURS PRN
Status: DISCONTINUED | OUTPATIENT
Start: 2017-06-01 | End: 2017-06-02

## 2017-06-01 RX ORDER — DEXTROSE MONOHYDRATE 25 G/50ML
25 INJECTION, SOLUTION INTRAVENOUS PRN
Status: DISCONTINUED | OUTPATIENT
Start: 2017-06-01 | End: 2017-06-06 | Stop reason: HOSPADM

## 2017-06-01 RX ADMIN — SODIUM CHLORIDE 500 ML: 9 INJECTION, SOLUTION INTRAVENOUS at 12:29

## 2017-06-01 RX ADMIN — MORPHINE SULFATE 2 MG: 4 INJECTION INTRAVENOUS at 15:50

## 2017-06-01 RX ADMIN — OXYCODONE HYDROCHLORIDE 10 MG: 10 TABLET ORAL at 23:13

## 2017-06-01 RX ADMIN — MAGNESIUM SULFATE HEPTAHYDRATE 1 G: 1 INJECTION, SOLUTION INTRAVENOUS at 12:54

## 2017-06-01 RX ADMIN — OXYCODONE HYDROCHLORIDE 10 MG: 10 TABLET ORAL at 19:30

## 2017-06-01 RX ADMIN — HYDROCODONE BITARTRATE AND ACETAMINOPHEN 1 TABLET: 5; 325 TABLET ORAL at 17:24

## 2017-06-01 RX ADMIN — CARVEDILOL 3.12 MG: 3.12 TABLET, FILM COATED ORAL at 17:24

## 2017-06-01 RX ADMIN — ATORVASTATIN CALCIUM 40 MG: 40 TABLET, FILM COATED ORAL at 20:00

## 2017-06-01 RX ADMIN — LIDOCAINE HYDROCHLORIDE: 10 INJECTION, SOLUTION INFILTRATION; PERINEURAL at 06:35

## 2017-06-01 RX ADMIN — DIPHENHYDRAMINE HCL 25 MG: 25 TABLET ORAL at 19:30

## 2017-06-01 RX ADMIN — TRANEXAMIC ACID 2000 MG: 100 INJECTION, SOLUTION INTRAVENOUS at 07:45

## 2017-06-01 RX ADMIN — METOPROLOL TARTRATE 12.5 MG: 25 TABLET, FILM COATED ORAL at 06:28

## 2017-06-01 RX ADMIN — ONDANSETRON 4 MG: 2 INJECTION INTRAMUSCULAR; INTRAVENOUS at 17:30

## 2017-06-01 RX ADMIN — STANDARDIZED SENNA CONCENTRATE AND DOCUSATE SODIUM 1 TABLET: 8.6; 5 TABLET, FILM COATED ORAL at 20:00

## 2017-06-01 RX ADMIN — INSULIN HUMAN 1 UNITS: 100 INJECTION, SOLUTION PARENTERAL at 13:15

## 2017-06-01 RX ADMIN — LIDOCAINE HYDROCHLORIDE 0.5 ML: 10 INJECTION, SOLUTION INFILTRATION; PERINEURAL at 06:35

## 2017-06-01 RX ADMIN — DEXMEDETOMIDINE HYDROCHLORIDE 0.7 MCG/KG/HR: 100 INJECTION, SOLUTION, CONCENTRATE INTRAVENOUS at 13:25

## 2017-06-01 RX ADMIN — VANCOMYCIN HYDROCHLORIDE 1500 MG: 100 INJECTION, POWDER, LYOPHILIZED, FOR SOLUTION INTRAVENOUS at 21:05

## 2017-06-01 RX ADMIN — CLEVIDIPINE 4 MG/HR: 0.5 EMULSION INTRAVENOUS at 19:38

## 2017-06-01 RX ADMIN — DEXMEDETOMIDINE HYDROCHLORIDE 0.7 MCG/KG/HR: 100 INJECTION, SOLUTION, CONCENTRATE INTRAVENOUS at 13:27

## 2017-06-01 RX ADMIN — CLEVIDIPINE 2 MG/HR: 0.5 EMULSION INTRAVENOUS at 12:45

## 2017-06-01 ASSESSMENT — COPD QUESTIONNAIRES
COPD SCREENING SCORE: 8
HAVE YOU SMOKED AT LEAST 100 CIGARETTES IN YOUR ENTIRE LIFE: YES
DO YOU EVER COUGH UP ANY MUCUS OR PHLEGM?: YES, A FEW DAYS A WEEK OR MONTH
DURING THE PAST 4 WEEKS HOW MUCH DID YOU FEEL SHORT OF BREATH: MOST  OR ALL OF THE TIME

## 2017-06-01 ASSESSMENT — PAIN SCALES - GENERAL
PAINLEVEL_OUTOF10: 5
PAINLEVEL_OUTOF10: 0
PAINLEVEL_OUTOF10: 0
PAINLEVEL_OUTOF10: 4
PAINLEVEL_OUTOF10: 5
PAINLEVEL_OUTOF10: 4

## 2017-06-01 ASSESSMENT — LIFESTYLE VARIABLES: EVER_SMOKED: YES

## 2017-06-01 NOTE — IP AVS SNAPSHOT
" <p align=\"LEFT\"><IMG SRC=\"//[email or web address]$/[email or web address]\" alt=\"Image\" WIDTH=\"50%\" HEIGHT=\"200\" BORDER=\"\"></p>                   Name:Evans Díaz  Medical Record Number:[id number]  CSN: [id number]    YOB: 1950   Age: 67 y.o.  Sex: male  HT:1.753 m (5' 9\") WT: 83.1 kg (183 lb 3.2 oz)          Admit Date: 6/1/2017     Discharge Date:   Today's Date: 6/6/2017  Attending Doctor:  Dionisio Jones M.D.                  Allergies:  Review of patient's allergies indicates no known allergies.          Your appointments     Jun 22, 2017  1:30 PM   HOSPITAL FOLLOW UP with Madhu Geronimo M.D.   [facility] (--)    [address]  [place] [id number] [phone]            Jun 27, 2017  9:00 AM   Pulmonary Function Test with PFT-RM3   [facility] (--)    [address]  [address]  [place] [address]   [phone]            Jun 27, 2017 10:20 AM   New Patient Pulmonary with A Rotation   [facility] (--)    [address]  [address]  [place] [address]   [phone]            Nov 10, 2017  8:40 AM   FOLLOW UP with Carmelo Jacob M.D.   [facility] (--)    [address]  [place] [address]   [phone]              Follow-up Information     1. Follow up with Dionisio Jones M.D. On 7/5/2017.    Specialty:  Cardiac Surgery    Why:  2:00 pm    Contact information    [id number]  [place] [address]  [phone]           Medication List      Take these Medications        Instructions    albuterol 108 (90 BASE) MCG/ACT Aers inhalation aerosol    Inhale 2 Puffs by mouth every four hours as needed.   Dose:  2 Puff       aspirin 81 MG Chew chewable tablet   Commonly known as:  ASA    Take 1 Tab by mouth every day.   Dose:  81 mg       atorvastatin 40 MG Tabs   Commonly known as:  LIPITOR    Take 1 Tab by mouth every bedtime.   Dose:  40 mg       carvedilol " 6.25 MG Tabs   Commonly known as:  COREG    Take 1 Tab by mouth 2 times a day, with meals.   Dose:  6.25 mg       clopidogrel 75 MG Tabs   Commonly known as:  PLAVIX    Take 1 Tab by mouth every day.   Dose:  75 mg        MG Caps    Take 100 mg by mouth every morning.   Dose:  100 mg       enalapril 5 MG Tabs   Commonly known as:  VASOTEC    Take 2 Tabs by mouth every day.   Dose:  10 mg       furosemide 10 MG/ML Soln   Commonly known as:  LASIX    2 mL by Intravenous route every day.   Dose:  20 mg       hydrocodone-acetaminophen 5-325 MG Tabs per tablet   Commonly known as:  NORCO    Take 1-2 Tabs by mouth every 6 hours as needed.   Dose:  1-2 Tab       ipratropium-albuterol 0.5-2.5 (3) MG/3ML nebulizer solution   Commonly known as:  DUONEB    3 mL by Nebulization route every four hours as needed for Shortness of Breath.   Dose:  3 mL       potassium chloride ER 10 MEQ tablet   Commonly known as:  KLOR-CON    Take 1 Tab by mouth every day.   Dose:  10 mEq       tiotropium 18 MCG Caps   Commonly known as:  SPIRIVA    Inhale 1 Cap by mouth every day.   Dose:  18 mcg

## 2017-06-01 NOTE — IP AVS SNAPSHOT
[employer] Access Code: Activation code not generated  Current [employer] Status: Active    [employer]Visithart  A secure, online tool to manage your health information     [employer]® is a secure, online tool that connects you to your personalized health information from the privacy of your home -- day or night - making it very easy for you to manage your healthcare. Once the activation process is completed, you can even access your medical information using the [employer] chaya, which is available for free in the Apple Chaya store or Google Play store.     [employer] provides the following levels of access (as shown below):   My Chart Features   [facility] Primary Care Doctor [facility]  Specialists [facility]  Urgent  Care Non-[facility]  Primary Care  Doctor   Email your healthcare team securely and privately 24/7 X X X X   Manage appointments: schedule your next appointment; view details of past/upcoming appointments X      Request prescription refills. X      View recent personal medical records, including lab and immunizations X X X X   View health record, including health history, allergies, medications X X X X   Read reports about your outpatient visits, procedures, consult and ER notes X X X X   See your discharge summary, which is a recap of your hospital and/or ER visit that includes your diagnosis, lab results, and care plan. X X       How to register for [employer]:  1. Go to  [email or web address].  2. Click on the Sign Up Now box, which takes you to the New Member Sign Up page. You will need to provide the following information:  a. Enter your [employer] Access Code exactly as it appears at the top of this page. (You will not need to use this code after you’ve completed the sign-up process. If you do not sign up before the expiration date, you must request a new code.)   b. Enter your date of birth.   c. Enter your home email address.   d. Click Submit, and follow the next screen’s instructions.  3. Create a [employer] ID. This will  be your [employer] login ID and cannot be changed, so think of one that is secure and easy to remember.  4. Create a [employer] password. You can change your password at any time.  5. Enter your Password Reset Question and Answer. This can be used at a later time if you forget your password.   6. Enter your e-mail address. This allows you to receive e-mail notifications when new information is available in [employer].  7. Click Sign Up. You can now view your health information.    For assistance activating your [employer] account, call [phone]

## 2017-06-01 NOTE — PROGRESS NOTES
Pt starting to awake, LING, and following commands, SBP spiked up to 140's, Cleviprex titrated up.

## 2017-06-01 NOTE — IP AVS SNAPSHOT
6/6/2017    Evans Díaz  [address] [address]    Dear Evans:    [facility] wants to ensure your discharge home is safe and you or your loved ones have had all of your questions answered regarding your care after you leave the hospital.    Below is a list of resources and contact information should you have any questions regarding your hospital stay, follow-up instructions, or active medical symptoms.    Questions or Concerns Regarding… Contact   Medical Questions Related to Your Discharge  (7 days a week, 8am-5pm) Contact a Nurse Care Coordinator   [phone]   Medical Questions Not Related to Your Discharge  (24 hours a day / 7 days a week)  Contact the Nurse Health Line   [phone]    Medications or Discharge Instructions Refer to your discharge packet   or contact your [facility] Primary Care Provider   [phone]   Follow-up Appointment(s) Schedule your appointment via [employer]   or contact Scheduling [phone]   Billing Review your statement via [employer]  or contact Billing [phone]   Medical Records Review your records via [employer]   or contact Medical Records [phone]     You may receive a telephone call within two days of discharge. This call is to make certain you understand your discharge instructions and have the opportunity to have any questions answered. You can also easily access your medical information, test results and upcoming appointments via the [employer] free online health management tool. You can learn more and sign up at [email or web address]. For assistance setting up your [employer] account, please call [phone].    Once again, we want to ensure your discharge home is safe and that you have a clear understanding of any next steps in your care. If you have any questions or concerns, please do not hesitate to contact us, we are here for you. Thank you for choosing [facility] for your healthcare needs.    Sincerely,    Your [facility] Healthcare Team

## 2017-06-01 NOTE — PROCEDURES
CENTRAL VENOUS/PA CATHETER   Start time 0830  End time 0840    Site:    Subclavian    Laterality:   right    Catheter:   Triple Lumen    Blood return all ports, all ports flushed:  Yes    All elements of maximal sterile barrier techniques met: Yes     Occlusive dressing, antimicrobial disc:  Yes     Ultrasound Guidance & Interpretation:  No     CXR for placement & absence of complications: Pending    Complications:  None    Additional Notes:         Dionisio Qureshi M.D.  6/1/2017  10:38 AM

## 2017-06-01 NOTE — OP REPORT
DATE OF SERVICE:  06/01/2017    DATE OF OPERATION:  06/01/2017    PREOPERATIVE DIAGNOSES:  Angina, coronary artery disease, hypertension,   dyslipidemia, hypercholesterolemia, initially poor ejection fraction and   improved over 2 years, cardiomyopathy, diabetes mellitus.    POSTOPERATIVE DIAGNOSES:  Angina, coronary artery disease, hypertension,   dyslipidemia, hypercholesterolemia, initially poor ejection fraction and   improved over 2 years, cardiomyopathy, diabetes mellitus.    OPERATION:  Coronary artery bypass grafting x2, left JIMBO to distal LAD,   reverse saphenous vein graft to distal right coronary artery, endoscopic vein   harvest, transesophageal echocardiography.    ESTIMATED BLOOD LOSS:  50 mL Cell Saver protocol.    SURGEON:  Dionisio Jones MD    ASSISTANT:  ENA Booker    ANESTHESIOLOGIST:  Dionisio Qureshi MD    ANESTHESIA:  General anesthetic.    DESCRIPTION OF PROCEDURE:  The patient was brought to the operating room,   placed in a supine position upon the table.  After adequate general   anesthesia, the patient prepped and draped in usual sterile manner.  Elbows   were protected to avoid ulnar neuropathy, chest wall expansion to avoid ulnar   neuropathy, phrenic nerve protectors used to protect phrenic nerve, removed at   end of case.    Phrenic nerve was normal, was not damaged.  Anatomy was normal at the end of   case.    Missy Vital harvested the vein from the right leg and thigh.  Side branch   secured using clips.  Leg closed with multilayer Vicryl and Dexon technique.    Missy Vital first assisted the case from start to the finish.    Midline sternal skin incision carried down to sternum, divided with saw, left   JIMBO was harvested, prepared for anastomosis, heparin given, ACT checked.    Patient cannulated with a 6.5 arterial cannula, 2-stage venous return   catheter, antegrade cardioplegia, began cardiopulmonary bypass, cooled 34   degrees, cross clamp applied, cardioplegia delivered.   Heart topically cooled   with cold saline, ice flush.  Phrenic nerves were protected.  Good vessels   noted.    Reverse saphenous vein graft placed to the right coronary at the crux using   6-0 Prolene technique.  This is a very calcified right coronary artery.  There   are no distal arteries to bypass the PDA and posterolateral thread-like;   therefore, we went as low as we could into the inferior distal lesion and I   believe we have good perfusion past this lesion.  Cardioplegia run was   delivered.    Left JIMBO was then sewn to the distal LAD using 7-0 running Prolene technique,   so this below the lesion partially bridging and so give good flow both   antegrade and retrograde into LAD.    Cross-clamp removed, side clamp placed.  Proximal anastomosis performed using   6-0 running Prolene technique for the right coronary artery.  Good anastomosis   appreciated.  AC  placed.    We noticed a small curtain leak at the left JIMBO and this was easily fixed with   octopus retractor 7-0 Prolene technique.    The patient with then brought up to normothermia, good ventilation, weaned   from cardiopulmonary bypass first attempt.  Lines were removed and doubly   secured.  Protamine was delivered.  Meticulous hemostasis present.  Chest   tubes and ventricular temporary pacemaker was placed for safety.    A femoral line was placed for monitoring for safety.    EKG returned normal.  ST segments are normal.  Pressure normal.    Methodical wound examination was done for hemostasis x5 rotations throughout   the mediastinum and no bleeding was noted.  Good hemostasis appreciated.  All   suture lines were tied and secure.    Methodical wound examination was done for foreign objects, none were found.    Counts were correct.    With good cardiodynamics, ST segments normal, pressure numbers are normal,   closure begun.    Good hemostasis appreciated.    Sternum closed with wire followed by linea alba and pectus fascia closure  with   running 0 Vicryl sutures in double-layer technique.  Skin closed with   subcuticular 4-0 Dexon suture technique.  The patient tolerated the procedure   well and transferred to ICU in stable condition.  Prevena dressing was placed   before the patient was transferred.  At dictation, Prevena dressing is being   placed.  I am dictating this in the operating room with the patient in the   operating room.       ____________________________________     MD LILY ASTUDILLO / TATYANA    DD:  06/01/2017 11:40:58  DT:  06/01/2017 12:28:12    D#:  [id number]  Job#:  [id number]

## 2017-06-01 NOTE — PROGRESS NOTES
Pt arrived to room with OR team. Bedside report received from Dr. Qureshi. All lines, tubes, and drips verified. Art line zeroed. Pt with stable /60, SR 80, on .01 mcg/kg/min of Epi . RT and 2nd RN at bedside. EKG called. Will draw labs and check ABG shortly.

## 2017-06-01 NOTE — IP AVS SNAPSHOT
" Home Care Instructions                                                                                                                  Name:Evans Díaz  Medical Record Number:[id number]  CSN: [id number]    YOB: 1950   Age: 67 y.o.  Sex: male  HT:1.753 m (5' 9\") WT: 83.1 kg (183 lb 3.2 oz)          Admit Date: 6/1/2017     Discharge Date:   Today's Date: 6/6/2017  Attending Doctor:  Dionisio Jones M.D.                  Allergies:  Review of patient's allergies indicates no known allergies.            Discharge Instructions       Discharge Instructions    Discharged to group home by medical transportation with escort. Discharged via wheelchair, hospital escort: Yes.  Special equipment needed: Oxygen    Be sure to schedule a follow-up appointment with your primary care doctor or any specialists as instructed.     Discharge Plan:   Smoking Cessation Offered: Patient Counseled  Influenza Vaccine Indication: Not indicated: Previously immunized this influenza season and > 8 years of age ([facility])    I understand that a diet low in cholesterol, fat, and sodium is recommended for good health. Unless I have been given specific instructions below for another diet, I accept this instruction as my diet prescription.   Other diet: cardiac    Special Instructions: heart surgery    Cardiac Surgery Discharge Instructions/[facility]    Activity:  1. NO driving for 4 weeks after surgery. You may ride as a passenger.  2. NO lifting of any item over 10 lbs (e.g. gallon of milk) for 6 weeks after surgery.  Do not raise both arms above head, only one at a time.  Do not push or pull with your arms.  3. Walk as much as possible! Walk a minimum of 4 times per day. Depending on your fatigue and comfort level, you may walk as much as you wish. There is no maximum.  4. Other physical activities (sex, housework, gardening, etc.) are OK after 4 weeks.  5. Continue using incentive spirometer for 2 weeks.  If you are going " home on oxygen and you were not on oxygen prior to surgery, keep using until you are oxygen free.  6. Weigh daily and write it down.  Call your doctor for a weight gain of 2-4 lbs in 1-2 days.    Incision Care:  1. SHOWER EVERYDAY-no baths. Make sure to clean your incision(s) twice daily with plain, perfume and dye-free soap (if you shower in the morning, stand at the sink at bedtime and clean incision with soap and water). Then pat incision(s) dry with clean towel. Avoid creams or lotions on the incision(s).    2. If there is any increase in redness or swelling, or separation of the incision line, or thick drainage* from any of the incisions, call [facility] ([phone]). * Clear, thin drainage is not abnormal especially from the leg incision and/or chest tube sites.                    3. Continue to wear your [place] Stockings for 4 weeks on the leg(s) with the incision(s) or swelling. You may take off the stocking(s) when in bed or when the leg(s) are elevated.    General Instructions:  1. You have been referred to Cardiac Rehab which is highly recommended for you after heart surgery. You can start Cardiac Rehab 30 days after surgery.  If you do not have an appointment at the time of discharge call [phone] to schedule an appointment.  2. Your Primary Care Doctor typically handles Home Oxygen. The Home Oxygen service that drops off your tanks should be checking your oxygen saturation levels. Oxygen may be stopped when you are > 90 % saturated on room air. Check with your Primary Care Doctor if you are unsure.    Prescription Refills/Questions:   Call your usual Pharmacy for ALL refills   1. Pain medication questions only: Call [facility] at [phone].  (Remember that refills may require additional physician approval and will need at least 24 hours’ notice. Please call for refills on Mondays through Fridays from 9 am to 4 pm).  2. For all other medications (except pain medication): Call  your Cardiology Group or Primary Care Doctor (not [facility]) for refills or questions.    [facility] IS ALWAYS AVAILABLE TO ANSWER YOUR QUESTIONS. DO NOT HESITATE TO CALL!    · Is patient discharged on Warfarin / Coumadin?   No     · Is patient Post Blood Transfusion?  No    Depression / Suicide Risk    As you are discharged from this [facility] facility, it is important to learn how to keep safe from harming yourself.    Recognize the warning signs:  · Abrupt changes in personality, positive or negative- including increase in energy   · Giving away possessions  · Change in eating patterns- significant weight changes-  positive or negative  · Change in sleeping patterns- unable to sleep or sleeping all the time   · Unwillingness or inability to communicate  · Depression  · Unusual sadness, discouragement and loneliness  · Talk of wanting to die  · Neglect of personal appearance   · Rebelliousness- reckless behavior  · Withdrawal from people/activities they love  · Confusion- inability to concentrate     If you or a loved one observes any of these behaviors or has concerns about self-harm, here's what you can do:  · Talk about it- your feelings and reasons for harming yourself  · Remove any means that you might use to hurt yourself (examples: pills, rope, extension cords, firearm)  · Get professional help from the community (Mental Health, Substance Abuse, psychological counseling)  · Do not be alone:Call your Safe Contact- someone whom you trust who will be there for you.  · Call your local CRISIS HOTLINE 526-1562 or 608-467-7972  · Call your local Children's Mobile Crisis Response Team [place] (370) 314-1681 or www.[employer]  · Call the toll free National Suicide Prevention Hotlines   · National Suicide Prevention Lifeline 328-654-WUEQ (6699)  · National Hope Line Network 800-SUICIDE (297-9583)    Discharge Instructions    Discharged to {DISCHARGE TO:374632} by  {TRANSPORTATION:560620} with {WIITH:546332}. Discharged via {DISCHARGED VIA:812399}, hospital escort: {HOSPITAL ESCORT:907431}.  Special equipment needed: {SPECIAL EQUIPMENT:174850}    Be sure to schedule a follow-up appointment with your primary care doctor or any specialists as instructed.     Discharge Plan:   Smoking Cessation Offered: Patient Counseled  Influenza Vaccine Indication: Not indicated: Previously immunized this influenza season and > 8 years of age ([facility])    I understand that a diet low in cholesterol, fat, and sodium is recommended for good health. Unless I have been given specific instructions below for another diet, I accept this instruction as my diet prescription.   Other diet: ***    Special Instructions: {BODY SYSTEMS:50532}    · Is patient discharged on Warfarin / Coumadin?   {WARFARIN YES/NO?:363816}    · Is patient Post Blood Transfusion?  {TRANSFUSION YES/NO?:90285}    Depression / Suicide Risk    As you are discharged from this [facility] facility, it is important to learn how to keep safe from harming yourself.    Recognize the warning signs:  · Abrupt changes in personality, positive or negative- including increase in energy   · Giving away possessions  · Change in eating patterns- significant weight changes-  positive or negative  · Change in sleeping patterns- unable to sleep or sleeping all the time   · Unwillingness or inability to communicate  · Depression  · Unusual sadness, discouragement and loneliness  · Talk of wanting to die  · Neglect of personal appearance   · Rebelliousness- reckless behavior  · Withdrawal from people/activities they love  · Confusion- inability to concentrate     If you or a loved one observes any of these behaviors or has concerns about self-harm, here's what you can do:  · Talk about it- your feelings and reasons for harming yourself  · Remove any means that you might use to hurt yourself (examples: pills, rope, extension cords, firearm)  · Get  professional help from the community (Mental Health, Substance Abuse, psychological counseling)  · Do not be alone:Call your Safe Contact- someone whom you trust who will be there for you.  · Call your local CRISIS HOTLINE 525-1904 or 800-491-5630  · Call your local Children's Mobile Crisis Response Team [place] (762) 121-1342 or www.[employer]  · Call the toll free National Suicide Prevention Hotlines   · National Suicide Prevention Lifeline 436-825-AMDV (3456)  · [employer] Line Network 800-SUICIDE (221-3087)            Your appointments     Jun 22, 2017  1:30 PM   HOSPITAL FOLLOW UP with Madhu Geronimo M.D.   [facility] (--)    [address]  [place] [address]   [phone]            Jun 27, 2017  9:00 AM   Pulmonary Function Test with PFT-RM3   [facility] (--)    [address]  [address]  [place] [address]   [phone]            Jun 27, 2017 10:20 AM   New Patient Pulmonary with A Rotation   [facility] (--)    [address]  [address]  [place] [address]   [phone]            Nov 10, 2017  8:40 AM   FOLLOW UP with Carmelo Jacob M.D.   [facility] (--)    [address]  [place] [address]   [phone]              Follow-up Information     1. Follow up with Dionisio Jones M.D. On 7/5/2017.    Specialty:  Cardiac Surgery    Why:  2:00 pm    Contact information    [id number]  [place] [address]  [phone]           Discharge Medication Instructions:    Below are the medications your physician expects you to take upon discharge:    Review all your home medications and newly ordered medications with your doctor and/or pharmacist. Follow medication instructions as directed by your doctor and/or pharmacist.    Please keep your medication list with you and share with your physician.               Medication List      START  taking these medications        Instructions    Morning Afternoon Evening Bedtime    albuterol 108 (90 BASE) MCG/ACT Aers inhalation aerosol   Last time this was given:  2 Puffs on 6/3/2017  9:21 AM        Inhale 2 Puffs by mouth every four hours as needed.   Dose:  2 Puff                        atorvastatin 40 MG Tabs   Last time this was given:  40 mg on 6/5/2017  7:54 PM   Commonly known as:  LIPITOR        Take 1 Tab by mouth every bedtime.   Dose:  40 mg                        clopidogrel 75 MG Tabs   Last time this was given:  75 mg on 6/6/2017  7:45 AM   Commonly known as:  PLAVIX        Take 1 Tab by mouth every day.   Dose:  75 mg                         MG Caps   Last time this was given:  100 mg on 6/5/2017  8:00 AM        Take 100 mg by mouth every morning.   Dose:  100 mg                        enalapril 5 MG Tabs   Last time this was given:  5 mg on 6/6/2017  7:46 AM   Commonly known as:  VASOTEC        Take 2 Tabs by mouth every day.   Dose:  10 mg                        furosemide 10 MG/ML Soln   Last time this was given:  20 mg on 6/6/2017  7:46 AM   Commonly known as:  LASIX        2 mL by Intravenous route every day.   Dose:  20 mg                        hydrocodone-acetaminophen 5-325 MG Tabs per tablet   Last time this was given:  1 Tab on 6/1/2017  5:24 PM   Commonly known as:  NORCO        Take 1-2 Tabs by mouth every 6 hours as needed.   Dose:  1-2 Tab                        ipratropium-albuterol 0.5-2.5 (3) MG/3ML nebulizer solution   Last time this was given:  3 mL on 6/2/2017 11:15 AM   Commonly known as:  DUONEB        3 mL by Nebulization route every four hours as needed for Shortness of Breath.   Dose:  3 mL                        potassium chloride ER 10 MEQ tablet   Last time this was given:  10 mEq on 6/6/2017  7:46 AM   Commonly known as:  KLOR-CON        Take 1 Tab by mouth every day.   Dose:  10 mEq                        tiotropium 18 MCG Caps   Last time this was given:   1 Cap on 6/6/2017  7:47 AM   Commonly known as:  SPIRIVA        Inhale 1 Cap by mouth every day.   Dose:  18 mcg                          CONTINUE taking these medications        Instructions    Morning Afternoon Evening Bedtime    aspirin 81 MG Chew chewable tablet   Last time this was given:  81 mg on 6/6/2017  7:45 AM   Commonly known as:  ASA        Take 1 Tab by mouth every day.   Dose:  81 mg                        carvedilol 6.25 MG Tabs   Last time this was given:  6.25 mg on 6/6/2017  7:45 AM   Commonly known as:  COREG        Take 1 Tab by mouth 2 times a day, with meals.   Dose:  6.25 mg                             Where to Get Your Medications      Information about where to get these medications is not yet available     ! Ask your nurse or doctor about these medications    - albuterol 108 (90 BASE) MCG/ACT Aers inhalation aerosol  - atorvastatin 40 MG Tabs  - clopidogrel 75 MG Tabs  -  MG Caps  - enalapril 5 MG Tabs  - furosemide 10 MG/ML Soln  - hydrocodone-acetaminophen 5-325 MG Tabs per tablet  - ipratropium-albuterol 0.5-2.5 (3) MG/3ML nebulizer solution  - potassium chloride ER 10 MEQ tablet  - tiotropium 18 MCG Caps            Orders for after discharge     REFERRAL TO HOME HEALTH    Complete by:  As directed    Home health will create and establish a plan of care       REFERRAL TO INTENSIVE CARDIAC REHAB/CARDIAC REHAB    Complete by:  As directed    Qualifying Diagnosis for Cardiac Rehab:    -Myocardial Infarction  -Old Myocardial Infarction  -Coronary Artery Bypass Surgery  -Stable Angina Pectoris  -PTCA Status with or without Stents  -Heart Valve Replaced by other means  -Heart Transplant   -Aneurysm Repair    Provider Exercise Prescription for Treatment Plan:  -Outpatient Cardiac Rehab (CR)/Intensive Cardiac Rehab (ICR), duration based on patient progress 1-3 times per week up to a total of 36/72 sessions over a period of up to 18/36 weeks    -Progressive interval exercise training for  20-45 minutes, 1-3 times per week in Cardiac Rehab utilizing Treadmill, Elliptical, Stationary Cycling, Arm Ergometer, other conditioning activities and appropriate home program supplement    -Light resistance training 2-4 weeks post PTCA, 2-4 weeks post MI, or 4-6 weeks post CABG, 4-6 weeks post aneurysm repair (20 # max)    -% TARGET HEART RATE OR MET’s:        RPE of 11-16 on a scale of 6-20       GXT Data:  40% - 80% exercise capacity using %HR max       HR below ischemic threshold (if determined, HR restriction)    -Education to promote an active healthy lifestyle and reduction of personal health risk factors    -Follow [facility] Policies and Procedures for Phase II CR/ICR       REFERRAL TO SKILLED NURSING FACILITY    Complete by:  As directed              Instructions           Diet / Nutrition:    Follow any diet instructions given to you by your doctor or the dietician, including how much salt (sodium) you are allowed each day.    If you are overweight, talk to your doctor about a weight reduction plan.    Activity:    Remain physically active following your doctor's instructions about exercise and activity.    Rest often.     Any time you become even a little tired or short of breath, SIT DOWN and rest.    Worsening Symptoms:    Report any of the following signs and symptoms to the doctor's office immediately:    *Pain of jaw, arm, or neck  *Chest pain not relieved by medication                               *Dizziness or loss of consciousness  *Difficulty breathing even when at rest   *More tired than usual                                       *Bleeding drainage or swelling of surgical site  *Swelling of feet, ankles, legs or stomach                 *Fever (>100ºF)  *Pink or blood tinged sputum  *Weight gain (3lbs/day or 5lbs /week)           *Shock from internal defibrillator (if applicable)  *Palpitations or irregular heartbeats                *Cool and/or numb extremities    Stroke Awareness    Common Risk  Factors for Stroke include:    Age  Atrial Fibrillation  Carotid Artery Stenosis  Diabetes Mellitus  Excessive alcohol consumption  High blood pressure  Overweight   Physical inactivity  Smoking    Warning signs and symptoms of a stroke include:    *Sudden numbness or weakness of the face, arm or leg (especially on one side of the body).  *Sudden confusion, trouble speaking or understanding.  *Sudden trouble seeing in one or both eyes.  *Sudden trouble walking, dizziness, loss of balance or coordination.Sudden severe headache with no known cause.    It is very important to get treatment quickly when a stroke occurs. If you experience any of the above warning signs, call 911 immediately.                   Disclaimer         Quit Smoking / Tobacco Use:    I understand the use of any tobacco products increases my chance of suffering from future heart disease or stroke and could cause other illnesses which may shorten my life. Quitting the use of tobacco products is the single most important thing I can do to improve my health. For further information on smoking / tobacco cessation call a Toll Free Quit Line at 1-174.805.6863 (*National [employer] [place]) or 1-183.649.9521 (American Lung Association) or you can access the web based program at www.lungusa.org.    [place] Tobacco Users Help Line:  (757) 746-8775       Toll Free: 1-511.880.7012  Quit Tobacco Program [facility] (454)174-3481    Crisis Hotline:    [place] Crisis Hotline:  5-911-UWUTUWZ or 1-585.364.3422    [place] Crisis Hotline:    1-317.880.3112 or 845-823-6692    Discharge Survey:   Thank you for choosing [facility]. We hope we did everything we could to make your hospital stay a pleasant one. You may be receiving a phone survey and we would appreciate your time and participation in answering the questions. Your input is very valuable to us in our efforts to improve our service to our patients and their families.        My  signature on this form indicates that:    1. I have reviewed and understand the above information.  2. My questions regarding this information have been answered to my satisfaction.  3. I have formulated a plan with my discharge nurse to obtain my prescribed medications for home.                  Disclaimer         __________________________________                     __________       ________                       Patient Signature                                                 Date                    Time

## 2017-06-01 NOTE — PROCEDURES
CENTRAL VENOUS/PA CATHETER   Start time 0815  End time 0830    Site:    Internal Jugular    Laterality:   right    Catheter:   Cordis w/ PA catheter    Blood return all ports, all ports flushed:  Yes    All elements of maximal sterile barrier techniques met: Yes     Occlusive dressing, antimicrobial disc:  Yes     Ultrasound Guidance & Interpretation:  No     CXR for placement & absence of complications: Pending    Complications:  None    Additional Notes:         Dionisio Qureshi M.D.  6/1/2017  10:37 AM

## 2017-06-01 NOTE — PROGRESS NOTES
Pt meeting all extubation parameters per protocol, RN and RT agree Pt safe to extubate at this time. Pt tolerated extubation fine and placed on 4L NC.

## 2017-06-01 NOTE — PROCEDURES
ARTERIAL LINE  Start time 0801  End time 0804      Site:    Radial    Laterality:   left    Complications: None    Additional notes:  Arrow 20g.         Dionisio Qureshi M.D.  6/1/2017  10:36 AM

## 2017-06-01 NOTE — PROGRESS NOTES
H&P Update:  I have seen and examined patient and there are no changes to my previous H&P.  Proceed with surgery today.

## 2017-06-02 ENCOUNTER — APPOINTMENT (OUTPATIENT)
Dept: RADIOLOGY | Facility: MEDICAL CENTER | Age: 67
DRG: 236 | End: 2017-06-02
Attending: NURSE PRACTITIONER
Payer: COMMERCIAL

## 2017-06-02 LAB
ANION GAP SERPL CALC-SCNC: 6 MMOL/L (ref 0–11.9)
BUN SERPL-MCNC: 32 MG/DL (ref 8–22)
CALCIUM SERPL-MCNC: 8 MG/DL (ref 8.5–10.5)
CHLORIDE SERPL-SCNC: 106 MMOL/L (ref 96–112)
CO2 SERPL-SCNC: 24 MMOL/L (ref 20–33)
CREAT SERPL-MCNC: 1.37 MG/DL (ref 0.5–1.4)
EKG IMPRESSION: NORMAL
ERYTHROCYTE [DISTWIDTH] IN BLOOD BY AUTOMATED COUNT: 46.2 FL (ref 35.9–50)
GFR SERPL CREATININE-BSD FRML MDRD: 51 ML/MIN/1.73 M 2
GLUCOSE BLD-MCNC: 102 MG/DL (ref 65–99)
GLUCOSE BLD-MCNC: 103 MG/DL (ref 65–99)
GLUCOSE BLD-MCNC: 103 MG/DL (ref 65–99)
GLUCOSE BLD-MCNC: 177 MG/DL (ref 65–99)
GLUCOSE BLD-MCNC: 196 MG/DL (ref 65–99)
GLUCOSE BLD-MCNC: 86 MG/DL (ref 65–99)
GLUCOSE BLD-MCNC: 87 MG/DL (ref 65–99)
GLUCOSE BLD-MCNC: 91 MG/DL (ref 65–99)
GLUCOSE BLD-MCNC: 95 MG/DL (ref 65–99)
GLUCOSE BLD-MCNC: 99 MG/DL (ref 65–99)
GLUCOSE BLD-MCNC: 99 MG/DL (ref 65–99)
GLUCOSE SERPL-MCNC: 106 MG/DL (ref 65–99)
HCT VFR BLD AUTO: 33.6 % (ref 42–52)
HGB BLD-MCNC: 11.2 G/DL (ref 14–18)
MCH RBC QN AUTO: 29.9 PG (ref 27–33)
MCHC RBC AUTO-ENTMCNC: 32.8 G/DL (ref 33.7–35.3)
MCV RBC AUTO: 91 FL (ref 81.4–97.8)
PLATELET # BLD AUTO: 210 K/UL (ref 164–446)
PMV BLD AUTO: 10 FL (ref 9–12.9)
POTASSIUM SERPL-SCNC: 4.2 MMOL/L (ref 3.6–5.5)
POTASSIUM SERPL-SCNC: 4.2 MMOL/L (ref 3.6–5.5)
RBC # BLD AUTO: 3.65 M/UL (ref 4.7–6.1)
SODIUM SERPL-SCNC: 136 MMOL/L (ref 135–145)
WBC # BLD AUTO: 14.4 K/UL (ref 4.8–10.8)

## 2017-06-02 PROCEDURE — 700101 HCHG RX REV CODE 250: Performed by: THORACIC SURGERY (CARDIOTHORACIC VASCULAR SURGERY)

## 2017-06-02 PROCEDURE — 700102 HCHG RX REV CODE 250 W/ 637 OVERRIDE(OP): Performed by: NURSE PRACTITIONER

## 2017-06-02 PROCEDURE — 93010 ELECTROCARDIOGRAM REPORT: CPT | Performed by: INTERNAL MEDICINE

## 2017-06-02 PROCEDURE — 93005 ELECTROCARDIOGRAM TRACING: CPT | Performed by: NURSE PRACTITIONER

## 2017-06-02 PROCEDURE — 94640 AIRWAY INHALATION TREATMENT: CPT

## 2017-06-02 PROCEDURE — 770020 HCHG ROOM/CARE - TELE (206)

## 2017-06-02 PROCEDURE — 94668 MNPJ CHEST WALL SBSQ: CPT

## 2017-06-02 PROCEDURE — 700111 HCHG RX REV CODE 636 W/ 250 OVERRIDE (IP): Performed by: NURSE PRACTITIONER

## 2017-06-02 PROCEDURE — A9270 NON-COVERED ITEM OR SERVICE: HCPCS | Performed by: NURSE PRACTITIONER

## 2017-06-02 PROCEDURE — 82962 GLUCOSE BLOOD TEST: CPT | Mod: 91

## 2017-06-02 PROCEDURE — 700112 HCHG RX REV CODE 229: Performed by: NURSE PRACTITIONER

## 2017-06-02 PROCEDURE — 71010 DX-CHEST-LIMITED (1 VIEW): CPT

## 2017-06-02 PROCEDURE — 80048 BASIC METABOLIC PNL TOTAL CA: CPT

## 2017-06-02 PROCEDURE — 85027 COMPLETE CBC AUTOMATED: CPT

## 2017-06-02 PROCEDURE — 84132 ASSAY OF SERUM POTASSIUM: CPT

## 2017-06-02 RX ORDER — ONDANSETRON 2 MG/ML
4 INJECTION INTRAMUSCULAR; INTRAVENOUS EVERY 4 HOURS PRN
Status: DISCONTINUED | OUTPATIENT
Start: 2017-06-02 | End: 2017-06-06 | Stop reason: HOSPADM

## 2017-06-02 RX ORDER — IPRATROPIUM BROMIDE AND ALBUTEROL SULFATE 2.5; .5 MG/3ML; MG/3ML
3 SOLUTION RESPIRATORY (INHALATION)
Status: DISCONTINUED | OUTPATIENT
Start: 2017-06-02 | End: 2017-06-06 | Stop reason: HOSPADM

## 2017-06-02 RX ORDER — POTASSIUM CHLORIDE 20 MEQ/1
20 TABLET, EXTENDED RELEASE ORAL ONCE
Status: COMPLETED | OUTPATIENT
Start: 2017-06-02 | End: 2017-06-02

## 2017-06-02 RX ORDER — PROMETHAZINE HYDROCHLORIDE 25 MG/1
25 SUPPOSITORY RECTAL EVERY 6 HOURS PRN
Status: DISCONTINUED | OUTPATIENT
Start: 2017-06-02 | End: 2017-06-06 | Stop reason: HOSPADM

## 2017-06-02 RX ORDER — ALPRAZOLAM 0.25 MG/1
0.25 TABLET ORAL EVERY 6 HOURS PRN
Status: DISCONTINUED | OUTPATIENT
Start: 2017-06-02 | End: 2017-06-06 | Stop reason: HOSPADM

## 2017-06-02 RX ORDER — ALBUTEROL SULFATE 90 UG/1
2 AEROSOL, METERED RESPIRATORY (INHALATION) EVERY 4 HOURS PRN
Status: DISCONTINUED | OUTPATIENT
Start: 2017-06-02 | End: 2017-06-06 | Stop reason: HOSPADM

## 2017-06-02 RX ORDER — POTASSIUM CHLORIDE 7.45 MG/ML
10 INJECTION INTRAVENOUS ONCE
Status: COMPLETED | OUTPATIENT
Start: 2017-06-02 | End: 2017-06-02

## 2017-06-02 RX ORDER — FUROSEMIDE 10 MG/ML
40 INJECTION INTRAMUSCULAR; INTRAVENOUS ONCE
Status: COMPLETED | OUTPATIENT
Start: 2017-06-02 | End: 2017-06-02

## 2017-06-02 RX ADMIN — ATORVASTATIN CALCIUM 40 MG: 40 TABLET, FILM COATED ORAL at 21:08

## 2017-06-02 RX ADMIN — CARVEDILOL 3.12 MG: 3.12 TABLET, FILM COATED ORAL at 17:17

## 2017-06-02 RX ADMIN — OXYCODONE HYDROCHLORIDE 5 MG: 5 TABLET ORAL at 08:19

## 2017-06-02 RX ADMIN — DOCUSATE SODIUM 100 MG: 100 CAPSULE ORAL at 07:50

## 2017-06-02 RX ADMIN — IPRATROPIUM BROMIDE AND ALBUTEROL SULFATE 3 ML: .5; 3 SOLUTION RESPIRATORY (INHALATION) at 05:36

## 2017-06-02 RX ADMIN — OXYCODONE HYDROCHLORIDE 10 MG: 10 TABLET ORAL at 04:04

## 2017-06-02 RX ADMIN — MAGNESIUM SULFATE HEPTAHYDRATE 1 G: 1 INJECTION, SOLUTION INTRAVENOUS at 12:27

## 2017-06-02 RX ADMIN — CARVEDILOL 3.12 MG: 3.12 TABLET, FILM COATED ORAL at 07:50

## 2017-06-02 RX ADMIN — POTASSIUM CHLORIDE 10 MEQ: 7.46 INJECTION, SOLUTION INTRAVENOUS at 06:31

## 2017-06-02 RX ADMIN — ONDANSETRON 4 MG: 2 INJECTION INTRAMUSCULAR; INTRAVENOUS at 07:55

## 2017-06-02 RX ADMIN — FUROSEMIDE 40 MG: 10 INJECTION, SOLUTION INTRAMUSCULAR; INTRAVENOUS at 17:42

## 2017-06-02 RX ADMIN — POTASSIUM CHLORIDE 10 MEQ: 7.46 INJECTION, SOLUTION INTRAVENOUS at 01:19

## 2017-06-02 RX ADMIN — ASPIRIN 81 MG: 81 TABLET, CHEWABLE ORAL at 07:50

## 2017-06-02 RX ADMIN — CLOPIDOGREL 75 MG: 75 TABLET, FILM COATED ORAL at 07:50

## 2017-06-02 RX ADMIN — IPRATROPIUM BROMIDE AND ALBUTEROL SULFATE 3 ML: .5; 3 SOLUTION RESPIRATORY (INHALATION) at 11:15

## 2017-06-02 RX ADMIN — DIPHENHYDRAMINE HCL 25 MG: 25 TABLET ORAL at 23:00

## 2017-06-02 RX ADMIN — OXYCODONE HYDROCHLORIDE 10 MG: 10 TABLET ORAL at 17:17

## 2017-06-02 RX ADMIN — ENOXAPARIN SODIUM 40 MG: 100 INJECTION SUBCUTANEOUS at 21:08

## 2017-06-02 RX ADMIN — POTASSIUM CHLORIDE 20 MEQ: 1500 TABLET, EXTENDED RELEASE ORAL at 17:42

## 2017-06-02 RX ADMIN — OXYCODONE HYDROCHLORIDE 5 MG: 5 TABLET ORAL at 12:25

## 2017-06-02 RX ADMIN — INSULIN HUMAN 8 UNITS: 100 INJECTION, SUSPENSION SUBCUTANEOUS at 21:12

## 2017-06-02 ASSESSMENT — LIFESTYLE VARIABLES
EVER_SMOKED: YES
DO YOU DRINK ALCOHOL: NO

## 2017-06-02 ASSESSMENT — ENCOUNTER SYMPTOMS
PSYCHIATRIC NEGATIVE: 1
CARDIOVASCULAR NEGATIVE: 1
RESPIRATORY NEGATIVE: 1
CONSTITUTIONAL NEGATIVE: 1
MUSCULOSKELETAL NEGATIVE: 1
EYES NEGATIVE: 1
NEUROLOGICAL NEGATIVE: 1
GASTROINTESTINAL NEGATIVE: 1

## 2017-06-02 ASSESSMENT — PAIN SCALES - GENERAL
PAINLEVEL_OUTOF10: 3
PAINLEVEL_OUTOF10: 2
PAINLEVEL_OUTOF10: 4
PAINLEVEL_OUTOF10: 4
PAINLEVEL_OUTOF10: 3
PAINLEVEL_OUTOF10: 3
PAINLEVEL_OUTOF10: 6
PAINLEVEL_OUTOF10: 2
PAINLEVEL_OUTOF10: 7
PAINLEVEL_OUTOF10: 4
PAINLEVEL_OUTOF10: 2

## 2017-06-02 NOTE — CARE PLAN
Problem: Oxygenation:  Goal: Maintain adequate oxygenation dependent on patient condition  Outcome: PROGRESSING SLOWER THAN EXPECTED    Problem: Hyperinflation:  Goal: Prevent or improve atelectasis  Outcome: PROGRESSING AS EXPECTED  PT IS is 1000

## 2017-06-02 NOTE — CARE PLAN
Problem: Post Op Day 1 CABG/Heart Valve Replacement  Goal: Optimal care of the post op CABG/heart valve replacement Post Op Day 1  Intervention: EKG and CXR completed  Done.   Intervention: All valve patients: PT/INR daily  N/A  Intervention: Antibiotics are discontinued within 24 hours of anesthesia end time unless indication documented for continuation beyond 24 hours  Done.  Intervention: Daily Weights  Done.  Intervention: Up in chair for all meals  Done.  Intervention: Ambulate in am if stable. First ambulation is 25 feet. Repeat x 3 as tolerated. Ambulate again before bed.  Ambulated 360ft.   Intervention: Discontinue nassar catheter unless documented reason for continuation  To be done.   Intervention: Remove original surgical dressing after 24 hrs, leave open to air unless otherwise specified by physician  Done.  Intervention: Consider chest tube removal by MD  To be done.  Intervention: IS q 1 hour while awake and record best IS volume  750ml  Intervention: Graduated elastic stockings  Done.  Intervention: Saline lock IV  Done.  Intervention: Transfer to [facility], begin VS q 4 hours  Done.   Intervention: After 24th hour post-anesthesia end time, transition patient to Cardiac Surgery SQ Insulin Protocol  Done.   Intervention: If patient is CABG or on home beta-blocker, start Beta-Blocker on POD 1 or POD 2 or contraindication documented  Done.

## 2017-06-02 NOTE — PROGRESS NOTES
Monitor Summary: Sinus Rhythm-Sinus Tachycardia rates  bpm.  AL 0.16/ QRS 0.08/ QT 0.34    12 hr chart check

## 2017-06-02 NOTE — CARE PLAN
Problem: Day of surgery post CABG/Heart valve replacement  Goal: Stabilization in immediate post op period  Intervention: VS q 15 min x 4 hours, then q 1 hour. Include temperature immediately upon arrival. Check CO/CI q 2-4 hours and PRN  Done      Intervention: If radial artery used, elevate arm, no BP checks or needle sticks from affected arm, monitor ulnar pulse and capillary refill  Done  Intervention: First post op hour labs and diagnostics per MD order  DOne      Intervention: Serum K q 6 hours x 24 hours. ABG and CBC prn.  Done, per protocol                Intervention: For FSBS greater than 130, start Post Cardiac Surgery Insulin Drip Protocol  Started in OR      Intervention: FSBS frequency as per Cardiac Surgery Insulin Drip Protocol  Done  Intervention: For patients on Beta Blockers: verify dose given prior to surgery or within 6 hours after arrival to the unit  Charted @ 0628  Intervention: Chest tube to 20 cm suction, record CT drainage with VS  Done     Intervention: For CT drainage > 300 cc in first post op hour and/or 150 cc in subsequent hours: platelets, coag screen, fibrinogen, H&H per order  N/a, CT's with slow drainage flow  Intervention: Titrate and wean off vasoactive drips per patient’s condition and per MD order while maintaining SBP  mmHg per MD order  Done  Intervention: VAP protocol in place  Done  Intervention: Wean from vent per protocol (see protocol), extubation goal with 2-6 hours post op.  ASV settings weaned as Pt tolerated. Extubation @ 4hrs 25 mins post op  Intervention: IS q 1 hour while awake post extubation  DOne, Best IS 1200      Intervention: Bedrest until extubated and groin lines out  DOne.

## 2017-06-02 NOTE — PROGRESS NOTES
0015: Pt c/o shortness of breath, SpO2 down to 90%, O2 titrated up to 5L and chest xray ordered per standing orders. Lung sounds remain clear/dim, no tachypnea noted. Pt is dyspneic on exertion and during mobilization. IS performed, Pt achieved 1000 ml. Pt resting in bed at this time, vitals are stable. Will monitor, awaiting xray.  0035: Xray at bedside, improved from prior. Pt's SpO2 up to 94%, RR 15, no s/s distress. Will monitor  0100: Pt asleep in bed, all vitals are stable. SPO2 95%, RR 15

## 2017-06-02 NOTE — DIETARY
Nutrition Services: Consult cardiac rehab diet education    Pt is a 67 y.o. Male with Dx: CABG x2    PMH: CAD, HTN, DM, dyslipidemia, hypercholesterolemia, cardiomyopathy  BMI= 28.28  Labs: HA1c 6.4% (5/31); lipid panel 10/2016: Cholesterol 209, triglycerides 269, HDL 27,     S/p CABG x2 yesterday 6/1. Pt asleep @ time of visit, so left healthy diet handouts on bedside table. Diet advancing.    RD will F/u to discuss diet recommendations.

## 2017-06-02 NOTE — PROGRESS NOTES
Cardiovascular Surgery Progress Note    Name: Evans Díaz  MRN: [id number]  : 1950  Admit Date: 2017  5:04 AM  Procedure:  Procedure(s) and Anesthesia Type:     * MULTIPLE CORONARY ARTERY BYPASS x2 ENDO VEIN HARVEST AND PREVENA DRESSING - General     * HELLEN - INTRA-OPERATIVE - General  1 Day Post-Op    Vitals:  Patient Vitals for the past 8 hrs:   Temp SpO2 O2 Delivery O2 (LPM) Pulse Heart Rate (Monitored) Resp NIBP Weight   17 0800 37 °C (98.6 °F) 93 % Silicone Nasal Cannula 4 100 100 20 121/69 mmHg -   17 0700 - 94 % - - (!) 101 (!) 103 (!) 23 123/71 mmHg -   17 0630 - 94 % - - 99 99 18 124/72 mmHg -   17 0605 - 95 % - 4 (!) 105 (!) 104 (!) 30 - -   17 0600 - 94 % - - 100 100 17 123/68 mmHg -   17 0537 - 93 % - 4 (!) 101 (!) 101 (!) 23 - -   17 0530 - 92 % - - 100 100 16 117/69 mmHg -   17 0500 - 95 % - - (!) 102 (!) 103 (!) 26 - 86.9 kg (191 lb 9.3 oz)   17 0430 - 96 % - - (!) 104 (!) 104 16 125/70 mmHg -   17 0400 - 95 % Silicone Nasal Cannula 4 (!) 107 (!) 107 20 108/68 mmHg -   17 0330 - 95 % - - (!) 106 (!) 106 19 112/79 mmHg -   17 0300 - 95 % - - (!) 106 (!) 107 19 114/76 mmHg -   17 0230 - 96 % - - 100 100 15 108/81 mmHg -   17 0200 - 96 % - - (!) 101 100 13 102/71 mmHg -   17 0130 - 96 % - - (!) 102 (!) 102 14 (!) 95/64 mmHg -     Temp (24hrs), Av.8 °C (98.3 °F), Min:36.2 °C (97.2 °F), Max:37.6 °C (99.7 °F)      Respiratory:  [place] Vent Mode: Spont, PEEP/CPAP: 8, FiO2: 40, P Peak (PIP): 17, P MEAN: 12 Respiration: 20, Pulse Oximetry: 93 %, O2 Daily Delivery Respiratory : Silicone Nasal Cannula  Chest Tube Group Right;Left;Mediastinal straight x2 36-Tube Status / Drainage: Draining;Patent;Sutured in Place;Small;Moderate;Sanguinous, Chest Tube Group Right;Left;Mediastinal straight x2 36-Device: Dry Closed Drainage System;Suction 20 cm Water  Chest Tube Drains:     Mediastinal Chest Tube 1: 20  ml    Fluids:    Intake/Output Summary (Last 24 hours) at 06/02/17 0924  Last data filed at 06/02/17 0800   Gross per 24 hour   Intake 7153.75 ml   Output   2710 ml   Net 4443.75 ml     Admit weight: Weight: 84.5 kg (186 lb 4.6 oz)  Current weight: Weight: 86.9 kg (191 lb 9.3 oz) (06/02/17 0500)    Labs:  Recent Labs      05/31/17   1316  06/01/17   1025  06/01/17   1218  06/02/17   0001   WBC  10.4   --    --   14.4*   RBC  4.80   --    --   3.65*   HEMOGLOBIN  14.6   --    --   11.2*   HEMATOCRIT  43.2   --    --   33.6*   MCV  90.0   --    --   91.0   MCH  30.4   --    --   29.9   MCHC  33.8   --    --   32.8*   RDW  45.3   --    --   46.2   PLATELETCT  286  187  225  210   MPV  9.8   --    --   10.0     Recent Labs      05/31/17   1316   NEUTSPOLYS  69.10   LYMPHOCYTES  18.20*   MONOCYTES  8.80   EOSINOPHILS  2.70   BASOPHILS  0.50     Recent Labs      05/31/17   1316  06/01/17   1700  06/02/17   0001  06/02/17   0458   SODIUM  136   --   136   --    POTASSIUM  4.4  4.3  4.2  4.2   CHLORIDE  104   --   106   --    CO2  25   --   24   --    GLUCOSE  120*   --   106*   --    BUN  31*   --   32*   --    CREATININE  1.32   --   1.37   --    CALCIUM  10.1   --   8.0*   --      Recent Labs      05/31/17   1316  06/01/17   1218   APTT  31.5  35.6   INR  0.96  1.31*       Medications:  • insulin NPH  8 Units     • insulin lispro  6 Units     • insulin lispro  0-15 Units     • insulin lispro  3 Units     • aspirin  81 mg     • K+ Scale: Goal of 4.5  1 Each     • docusate sodium  100 mg      And   • senna-docusate  1 Tab     • enoxaparin  40 mg     • magnesium sulfate  1 g Stopped (06/01/17 5019)   • carvedilol  3.125 mg     • atorvastatin  40 mg     • clopidogrel  75 mg     • insulin regular  0-14 Units         Exam:   Review of Systems   Constitutional: Negative.    HENT: Negative.    Eyes: Negative.    Respiratory: Negative.    Cardiovascular: Negative.    Gastrointestinal: Negative.    Genitourinary: Negative.     Musculoskeletal: Negative.    Skin: Negative.    Neurological: Negative.    Psychiatric/Behavioral: Negative.        Physical Exam   Constitutional: He is oriented to person, place, and time. He appears well-developed and well-nourished.   HENT:   Head: Normocephalic and atraumatic.   Eyes: Pupils are equal, round, and reactive to light.   Neck: Normal range of motion. Neck supple.   Cardiovascular: Normal rate and regular rhythm.    Pulmonary/Chest: Effort normal.   Decreased at bases.    Abdominal: Soft. Bowel sounds are normal.   Musculoskeletal: Normal range of motion.   Neurological: He is alert and oriented to person, place, and time.   Skin: Skin is warm and dry.   Wounds CDI   Psychiatric: He has a normal mood and affect.       Quality Measures:   EKG reviewed, Medications reviewed, Radiology images reviewed and Labs reviewed  Nassar catheter: No Nassar  Central line in place: Need for access    DVT Prophylaxis: Enoxaparin (Lovenox)  DVT prophylaxis - mechanical: SCDs  Ulcer prophylaxis: Yes    Assessed for rehab: Patient returned to prior level of function, rehabilitation not indicated at this time      Assessment/Plan:  Extubated, HDS, no drips, chest tube output minimal and negative for air leak.  Neuro grossly intact.  Plan: Transition to tele status.  DC chest tubes.  DC nassar.    Active Hospital Problems    Diagnosis   • CAD (coronary artery disease) [I25.10]

## 2017-06-02 NOTE — PROGRESS NOTES
Call from Missy SUTHERLAND. Update given regarding current Pt status. Orders received to remove femoral art-line at this time as it is no longer indicated.

## 2017-06-02 NOTE — CARE PLAN
Problem: Day of surgery post CABG/Heart valve replacement  Goal: Stabilization in immediate post op period  Intervention: VS q 15 min x 4 hours, then q 1 hour. Include temperature immediately upon arrival. Check CO/CI q 2-4 hours and PRN  Complete, See EPIC  Intervention: If radial artery used, elevate arm, no BP checks or needle sticks from affected arm, monitor ulnar pulse and capillary refill  Left radial arterial line in place, arm elevated, cap refill WNL  Intervention: First post op hour labs and diagnostics per MD order  Labs obtained per orders, results in EPIC  Intervention: Serum K q 6 hours x 24 hours. ABG and CBC prn.  0000 K+ was 4.2, 10 meq KCl IVPB ordered per K scale protocol, will continue to monitor serum potassium q 6 hrs as ordered  Intervention: For FSBS greater than 130, start Post Cardiac Surgery Insulin Drip Protocol  Insulin gtt infusing per [facility] protocol, current running at 1.5 units/hr  Intervention: FSBS frequency as per Cardiac Surgery Insulin Drip Protocol  FSBS frequency q 2 hrs currently as three consecutive blood sugars have been within the range of  per protocol  Intervention: For patients on Beta Blockers: verify dose given prior to surgery or within 6 hours after arrival to the unit  PO Metoprolol 12.5 mg given prior to surgery  Intervention: Chest tube to 20 cm suction, record CT drainage with VS  Mediastinal CTs connected to 20 cm of suction per orders, see output in I/O flowsheet in EPIC  Intervention: For CT drainage > 300 cc in first post op hour and/or 150 cc in subsequent hours: platelets, coag screen, fibrinogen, H&H per order  CT output WNL, totaling 230 ml sanginous draiange since surgery, 130 ml shift total thus far  Intervention: Titrate and wean off vasoactive drips per patient’s condition and per MD order while maintaining SBP  mmHg per MD order  Cleviprex gtt titrated off, Pt currently on no vasoactive gtts, SBP in 110s at this time, will continue to  monitor and re-initiate gtts as needed to maintain SBP <120 per orders  Intervention: VAP protocol in place  Complete  Intervention: Wean from vent per protocol (see protocol), extubation goal with 2-6 hours post op.  Complete, Pt extubated on day shift within 6 hrs post-op, currently on 5L NC, SpO2 95%  Intervention: IS q 1 hour while awake post extubation  Best IS post-op 1100 ml, Pt taught and encouraged on continued IS use  Intervention: Bedrest until extubated and groin lines out  Complete  Intervention: Out of bed, dangle 4 hours post extubation  0015: Pt dangled at edge of bed for ten minutes, Pt tolerated well, VSS throughout, Pt experienced some dyspnea on exertion. 60 ml pocket drained from CTs. Pt returned to bed without incident  Intervention: Maintain all original surgical dressings for 24 hours  Prevena in place over sternal incision, dressing CDI, due to be removed on POD 7 per protocol. Right EVH sites wrapped in ace banadage from surgery, CDI, due to be removed 24 hrs post-op, on 6/2 after 1215  Intervention: Clear liquids post extubation, advance as tolerated  Pt tolerating ice chips and water, no c/o N/V at this time. Will continue to advance as tolerated, Plan on Clear Liquid/Carb Consistent diet for AM  Intervention: Discontinue [place] fariba and arterial line 12-18 hours post op if hemodynamically stable and off vasoactive drips  Will D/C in AM  Intervention: A-Fib and DVT prophylaxis per MD order or contraindications documented (refer to DVT/VTE problem on Care Plan)  SCDs in place

## 2017-06-03 LAB
ANION GAP SERPL CALC-SCNC: 7 MMOL/L (ref 0–11.9)
BUN SERPL-MCNC: 36 MG/DL (ref 8–22)
CALCIUM SERPL-MCNC: 9.2 MG/DL (ref 8.5–10.5)
CHLORIDE SERPL-SCNC: 97 MMOL/L (ref 96–112)
CO2 SERPL-SCNC: 26 MMOL/L (ref 20–33)
CREAT SERPL-MCNC: 1.32 MG/DL (ref 0.5–1.4)
ERYTHROCYTE [DISTWIDTH] IN BLOOD BY AUTOMATED COUNT: 44.8 FL (ref 35.9–50)
GFR SERPL CREATININE-BSD FRML MDRD: 54 ML/MIN/1.73 M 2
GLUCOSE BLD-MCNC: 126 MG/DL (ref 65–99)
GLUCOSE BLD-MCNC: 143 MG/DL (ref 65–99)
GLUCOSE BLD-MCNC: 144 MG/DL (ref 65–99)
GLUCOSE BLD-MCNC: 154 MG/DL (ref 65–99)
GLUCOSE BLD-MCNC: 167 MG/DL (ref 65–99)
GLUCOSE BLD-MCNC: 169 MG/DL (ref 65–99)
GLUCOSE BLD-MCNC: 182 MG/DL (ref 65–99)
GLUCOSE SERPL-MCNC: 163 MG/DL (ref 65–99)
HCT VFR BLD AUTO: 32.3 % (ref 42–52)
HGB BLD-MCNC: 11 G/DL (ref 14–18)
MCH RBC QN AUTO: 30.2 PG (ref 27–33)
MCHC RBC AUTO-ENTMCNC: 34.1 G/DL (ref 33.7–35.3)
MCV RBC AUTO: 88.7 FL (ref 81.4–97.8)
PLATELET # BLD AUTO: 201 K/UL (ref 164–446)
PMV BLD AUTO: 10.1 FL (ref 9–12.9)
POTASSIUM SERPL-SCNC: 4.1 MMOL/L (ref 3.6–5.5)
RBC # BLD AUTO: 3.64 M/UL (ref 4.7–6.1)
SODIUM SERPL-SCNC: 130 MMOL/L (ref 135–145)
WBC # BLD AUTO: 16.9 K/UL (ref 4.8–10.8)

## 2017-06-03 PROCEDURE — 700102 HCHG RX REV CODE 250 W/ 637 OVERRIDE(OP): Performed by: THORACIC SURGERY (CARDIOTHORACIC VASCULAR SURGERY)

## 2017-06-03 PROCEDURE — A9270 NON-COVERED ITEM OR SERVICE: HCPCS | Performed by: THORACIC SURGERY (CARDIOTHORACIC VASCULAR SURGERY)

## 2017-06-03 PROCEDURE — A9270 NON-COVERED ITEM OR SERVICE: HCPCS | Performed by: NURSE PRACTITIONER

## 2017-06-03 PROCEDURE — 700111 HCHG RX REV CODE 636 W/ 250 OVERRIDE (IP): Performed by: CLINICAL NURSE SPECIALIST

## 2017-06-03 PROCEDURE — 700111 HCHG RX REV CODE 636 W/ 250 OVERRIDE (IP): Performed by: NURSE PRACTITIONER

## 2017-06-03 PROCEDURE — 770020 HCHG ROOM/CARE - TELE (206)

## 2017-06-03 PROCEDURE — 700112 HCHG RX REV CODE 229: Performed by: NURSE PRACTITIONER

## 2017-06-03 PROCEDURE — 82962 GLUCOSE BLOOD TEST: CPT | Mod: 91

## 2017-06-03 PROCEDURE — 80048 BASIC METABOLIC PNL TOTAL CA: CPT

## 2017-06-03 PROCEDURE — 94668 MNPJ CHEST WALL SBSQ: CPT

## 2017-06-03 PROCEDURE — 700102 HCHG RX REV CODE 250 W/ 637 OVERRIDE(OP): Performed by: NURSE PRACTITIONER

## 2017-06-03 PROCEDURE — 85027 COMPLETE CBC AUTOMATED: CPT

## 2017-06-03 RX ORDER — POTASSIUM CHLORIDE 750 MG/1
10 TABLET, FILM COATED, EXTENDED RELEASE ORAL 2 TIMES DAILY
Status: DISCONTINUED | OUTPATIENT
Start: 2017-06-03 | End: 2017-06-04

## 2017-06-03 RX ORDER — ENALAPRIL MALEATE 5 MG/1
5 TABLET ORAL
Status: DISCONTINUED | OUTPATIENT
Start: 2017-06-03 | End: 2017-06-06 | Stop reason: HOSPADM

## 2017-06-03 RX ORDER — CARVEDILOL 3.12 MG/1
3.12 TABLET ORAL ONCE
Status: COMPLETED | OUTPATIENT
Start: 2017-06-03 | End: 2017-06-03

## 2017-06-03 RX ORDER — CARVEDILOL 6.25 MG/1
6.25 TABLET ORAL 2 TIMES DAILY WITH MEALS
Status: DISCONTINUED | OUTPATIENT
Start: 2017-06-03 | End: 2017-06-06 | Stop reason: HOSPADM

## 2017-06-03 RX ORDER — TIOTROPIUM BROMIDE 18 UG/1
1 CAPSULE ORAL; RESPIRATORY (INHALATION)
Status: DISCONTINUED | OUTPATIENT
Start: 2017-06-04 | End: 2017-06-05

## 2017-06-03 RX ORDER — FUROSEMIDE 10 MG/ML
20 INJECTION INTRAMUSCULAR; INTRAVENOUS
Status: DISCONTINUED | OUTPATIENT
Start: 2017-06-03 | End: 2017-06-04

## 2017-06-03 RX ADMIN — CARVEDILOL 3.12 MG: 3.12 TABLET, FILM COATED ORAL at 08:01

## 2017-06-03 RX ADMIN — ENALAPRIL MALEATE 5 MG: 5 TABLET ORAL at 09:47

## 2017-06-03 RX ADMIN — DOCUSATE SODIUM 100 MG: 100 CAPSULE ORAL at 08:01

## 2017-06-03 RX ADMIN — DIPHENHYDRAMINE HCL 25 MG: 25 TABLET ORAL at 23:30

## 2017-06-03 RX ADMIN — FUROSEMIDE 20 MG: 10 INJECTION, SOLUTION INTRAMUSCULAR; INTRAVENOUS at 09:47

## 2017-06-03 RX ADMIN — POTASSIUM CHLORIDE 10 MEQ: 750 TABLET, FILM COATED, EXTENDED RELEASE ORAL at 21:01

## 2017-06-03 RX ADMIN — ONDANSETRON 4 MG: 2 INJECTION INTRAMUSCULAR; INTRAVENOUS at 08:11

## 2017-06-03 RX ADMIN — ALBUTEROL SULFATE 2 PUFF: 90 AEROSOL, METERED RESPIRATORY (INHALATION) at 09:21

## 2017-06-03 RX ADMIN — ATORVASTATIN CALCIUM 40 MG: 40 TABLET, FILM COATED ORAL at 21:01

## 2017-06-03 RX ADMIN — ENOXAPARIN SODIUM 40 MG: 100 INJECTION SUBCUTANEOUS at 08:02

## 2017-06-03 RX ADMIN — OXYCODONE HYDROCHLORIDE 5 MG: 5 TABLET ORAL at 05:55

## 2017-06-03 RX ADMIN — OXYCODONE HYDROCHLORIDE 5 MG: 5 TABLET ORAL at 23:30

## 2017-06-03 RX ADMIN — STANDARDIZED SENNA CONCENTRATE AND DOCUSATE SODIUM 1 TABLET: 8.6; 5 TABLET, FILM COATED ORAL at 21:01

## 2017-06-03 RX ADMIN — CLOPIDOGREL 75 MG: 75 TABLET, FILM COATED ORAL at 08:01

## 2017-06-03 RX ADMIN — OXYCODONE HYDROCHLORIDE 5 MG: 5 TABLET ORAL at 19:46

## 2017-06-03 RX ADMIN — INSULIN HUMAN 8 UNITS: 100 INJECTION, SUSPENSION SUBCUTANEOUS at 05:34

## 2017-06-03 RX ADMIN — FUROSEMIDE 20 MG: 10 INJECTION, SOLUTION INTRAMUSCULAR; INTRAVENOUS at 15:18

## 2017-06-03 RX ADMIN — MAGNESIUM SULFATE HEPTAHYDRATE 1 G: 1 INJECTION, SOLUTION INTRAVENOUS at 12:16

## 2017-06-03 RX ADMIN — POTASSIUM CHLORIDE 10 MEQ: 750 TABLET, FILM COATED, EXTENDED RELEASE ORAL at 09:47

## 2017-06-03 RX ADMIN — CARVEDILOL 3.12 MG: 3.12 TABLET, FILM COATED ORAL at 09:50

## 2017-06-03 RX ADMIN — ASPIRIN 81 MG: 81 TABLET, CHEWABLE ORAL at 08:01

## 2017-06-03 RX ADMIN — ONDANSETRON 4 MG: 2 INJECTION INTRAMUSCULAR; INTRAVENOUS at 01:41

## 2017-06-03 RX ADMIN — INSULIN HUMAN 8 UNITS: 100 INJECTION, SUSPENSION SUBCUTANEOUS at 15:15

## 2017-06-03 RX ADMIN — INSULIN HUMAN 8 UNITS: 100 INJECTION, SUSPENSION SUBCUTANEOUS at 21:01

## 2017-06-03 RX ADMIN — CARVEDILOL 6.25 MG: 6.25 TABLET, FILM COATED ORAL at 17:46

## 2017-06-03 ASSESSMENT — ENCOUNTER SYMPTOMS
CARDIOVASCULAR NEGATIVE: 1
EYES NEGATIVE: 1
CONSTITUTIONAL NEGATIVE: 1
MUSCULOSKELETAL NEGATIVE: 1
RESPIRATORY NEGATIVE: 1
NEUROLOGICAL NEGATIVE: 1
PSYCHIATRIC NEGATIVE: 1
GASTROINTESTINAL NEGATIVE: 1

## 2017-06-03 ASSESSMENT — PAIN SCALES - GENERAL
PAINLEVEL_OUTOF10: 1
PAINLEVEL_OUTOF10: 7
PAINLEVEL_OUTOF10: 0
PAINLEVEL_OUTOF10: 2
PAINLEVEL_OUTOF10: 3
PAINLEVEL_OUTOF10: 3
PAINLEVEL_OUTOF10: 4
PAINLEVEL_OUTOF10: 5
PAINLEVEL_OUTOF10: 5
PAINLEVEL_OUTOF10: 3

## 2017-06-03 NOTE — CARE PLAN
Problem: Post op day 2 CABG/Heart Valve Replacement  Goal: Optimal care of the post op CABG/heart valve replacement post op day 2  Intervention: FSBS: when 2 consecutive BS < 130 after post op day 2, discontinue FSBS unless patient is insulin dependent diabetic  Not completed yet.  Intervention: Up in chair for all meals  Done.  Intervention: Ambulate, increasing the distance each time x 3 and before bed  Done.  Intervention: Stand at sink and wash up with assistance. Clean incisions twice daily with soap and water.  N/A. Prevana in place  Intervention: IS q 1 hour while awake and record best IS volume  Done.  Intervention: Consider pacer wire removal by MD  Not done yet.  Intervention: Consider removal of nassar and chest tube if not already done  Already done.

## 2017-06-03 NOTE — CARE PLAN
Problem: Oxygenation:  Goal: Maintain adequate oxygenation dependent on patient condition  5LNC    Problem: Hyperinflation:  Goal: Prevent or improve atelectasis  Outcome: PROGRESSING SLOWER THAN EXPECTED  PEP QID IS measuring at 1000ml.

## 2017-06-03 NOTE — CARE PLAN
Problem: Post Op Day 1 CABG/Heart Valve Replacement  Goal: Optimal care of the post op CABG/heart valve replacement Post Op Day 1  Outcome: PROGRESSING AS EXPECTED  Mayorga D/C'd, up in chair for meals, chest tubes removed, IS encouraged, IV saline locked, tele status.

## 2017-06-03 NOTE — PROGRESS NOTES
Notified ENA Vital of patient's need for 6L and increased WOB of breathing through out the day with exertion.Patient 4L positive since surgery. 's systolic. Received order for 40mg IV lasix and 20 mEq potassium oral now.

## 2017-06-04 ENCOUNTER — APPOINTMENT (OUTPATIENT)
Dept: RADIOLOGY | Facility: MEDICAL CENTER | Age: 67
DRG: 236 | End: 2017-06-04
Attending: NURSE PRACTITIONER
Payer: COMMERCIAL

## 2017-06-04 LAB
ANION GAP SERPL CALC-SCNC: 7 MMOL/L (ref 0–11.9)
BUN SERPL-MCNC: 35 MG/DL (ref 8–22)
CALCIUM SERPL-MCNC: 8.7 MG/DL (ref 8.5–10.5)
CHLORIDE SERPL-SCNC: 96 MMOL/L (ref 96–112)
CO2 SERPL-SCNC: 28 MMOL/L (ref 20–33)
CREAT SERPL-MCNC: 1.3 MG/DL (ref 0.5–1.4)
ERYTHROCYTE [DISTWIDTH] IN BLOOD BY AUTOMATED COUNT: 43.8 FL (ref 35.9–50)
GFR SERPL CREATININE-BSD FRML MDRD: 55 ML/MIN/1.73 M 2
GLUCOSE BLD-MCNC: 98 MG/DL (ref 65–99)
GLUCOSE SERPL-MCNC: 100 MG/DL (ref 65–99)
HCT VFR BLD AUTO: 32 % (ref 42–52)
HGB BLD-MCNC: 10.8 G/DL (ref 14–18)
MCH RBC QN AUTO: 29.9 PG (ref 27–33)
MCHC RBC AUTO-ENTMCNC: 33.8 G/DL (ref 33.7–35.3)
MCV RBC AUTO: 88.6 FL (ref 81.4–97.8)
PLATELET # BLD AUTO: 216 K/UL (ref 164–446)
PMV BLD AUTO: 10.2 FL (ref 9–12.9)
POTASSIUM SERPL-SCNC: 3.6 MMOL/L (ref 3.6–5.5)
RBC # BLD AUTO: 3.61 M/UL (ref 4.7–6.1)
SODIUM SERPL-SCNC: 131 MMOL/L (ref 135–145)
WBC # BLD AUTO: 14.8 K/UL (ref 4.8–10.8)

## 2017-06-04 PROCEDURE — A9270 NON-COVERED ITEM OR SERVICE: HCPCS | Performed by: CLINICAL NURSE SPECIALIST

## 2017-06-04 PROCEDURE — 82962 GLUCOSE BLOOD TEST: CPT

## 2017-06-04 PROCEDURE — 770020 HCHG ROOM/CARE - TELE (206)

## 2017-06-04 PROCEDURE — G8978 MOBILITY CURRENT STATUS: HCPCS | Mod: CK

## 2017-06-04 PROCEDURE — 97162 PT EVAL MOD COMPLEX 30 MIN: CPT

## 2017-06-04 PROCEDURE — 85027 COMPLETE CBC AUTOMATED: CPT

## 2017-06-04 PROCEDURE — 94668 MNPJ CHEST WALL SBSQ: CPT

## 2017-06-04 PROCEDURE — 700102 HCHG RX REV CODE 250 W/ 637 OVERRIDE(OP): Performed by: INTERNAL MEDICINE

## 2017-06-04 PROCEDURE — G8979 MOBILITY GOAL STATUS: HCPCS | Mod: CI

## 2017-06-04 PROCEDURE — 700102 HCHG RX REV CODE 250 W/ 637 OVERRIDE(OP): Performed by: NURSE PRACTITIONER

## 2017-06-04 PROCEDURE — 80048 BASIC METABOLIC PNL TOTAL CA: CPT

## 2017-06-04 PROCEDURE — A9270 NON-COVERED ITEM OR SERVICE: HCPCS | Performed by: NURSE PRACTITIONER

## 2017-06-04 PROCEDURE — 700111 HCHG RX REV CODE 636 W/ 250 OVERRIDE (IP): Performed by: CLINICAL NURSE SPECIALIST

## 2017-06-04 PROCEDURE — G8987 SELF CARE CURRENT STATUS: HCPCS | Mod: CJ

## 2017-06-04 PROCEDURE — G8988 SELF CARE GOAL STATUS: HCPCS | Mod: CI

## 2017-06-04 PROCEDURE — A9270 NON-COVERED ITEM OR SERVICE: HCPCS | Performed by: INTERNAL MEDICINE

## 2017-06-04 PROCEDURE — 700111 HCHG RX REV CODE 636 W/ 250 OVERRIDE (IP): Performed by: NURSE PRACTITIONER

## 2017-06-04 PROCEDURE — 700102 HCHG RX REV CODE 250 W/ 637 OVERRIDE(OP): Performed by: CLINICAL NURSE SPECIALIST

## 2017-06-04 PROCEDURE — 97166 OT EVAL MOD COMPLEX 45 MIN: CPT

## 2017-06-04 PROCEDURE — 700112 HCHG RX REV CODE 229: Performed by: NURSE PRACTITIONER

## 2017-06-04 PROCEDURE — 71020 DX-CHEST-2 VIEWS: CPT

## 2017-06-04 RX ORDER — POTASSIUM CHLORIDE 750 MG/1
10 TABLET, FILM COATED, EXTENDED RELEASE ORAL DAILY
Status: DISCONTINUED | OUTPATIENT
Start: 2017-06-05 | End: 2017-06-06 | Stop reason: HOSPADM

## 2017-06-04 RX ORDER — FUROSEMIDE 10 MG/ML
20 INJECTION INTRAMUSCULAR; INTRAVENOUS
Status: DISCONTINUED | OUTPATIENT
Start: 2017-06-05 | End: 2017-06-06 | Stop reason: HOSPADM

## 2017-06-04 RX ADMIN — TIOTROPIUM BROMIDE 1 CAPSULE: 18 CAPSULE ORAL; RESPIRATORY (INHALATION) at 10:12

## 2017-06-04 RX ADMIN — DOCUSATE SODIUM 100 MG: 100 CAPSULE ORAL at 07:39

## 2017-06-04 RX ADMIN — ENALAPRIL MALEATE 5 MG: 5 TABLET ORAL at 07:39

## 2017-06-04 RX ADMIN — FUROSEMIDE 20 MG: 10 INJECTION, SOLUTION INTRAMUSCULAR; INTRAVENOUS at 05:31

## 2017-06-04 RX ADMIN — CLOPIDOGREL 75 MG: 75 TABLET, FILM COATED ORAL at 07:39

## 2017-06-04 RX ADMIN — CARVEDILOL 6.25 MG: 6.25 TABLET, FILM COATED ORAL at 07:41

## 2017-06-04 RX ADMIN — ASPIRIN 81 MG: 81 TABLET, CHEWABLE ORAL at 07:39

## 2017-06-04 RX ADMIN — POTASSIUM CHLORIDE 10 MEQ: 750 TABLET, FILM COATED, EXTENDED RELEASE ORAL at 07:39

## 2017-06-04 RX ADMIN — CARVEDILOL 6.25 MG: 6.25 TABLET, FILM COATED ORAL at 17:00

## 2017-06-04 RX ADMIN — ENOXAPARIN SODIUM 40 MG: 100 INJECTION SUBCUTANEOUS at 07:39

## 2017-06-04 RX ADMIN — ONDANSETRON 4 MG: 2 INJECTION INTRAMUSCULAR; INTRAVENOUS at 10:11

## 2017-06-04 RX ADMIN — ALPRAZOLAM 0.25 MG: 0.25 TABLET ORAL at 20:09

## 2017-06-04 RX ADMIN — MAGNESIUM HYDROXIDE 30 ML: 400 SUSPENSION ORAL at 14:50

## 2017-06-04 RX ADMIN — ATORVASTATIN CALCIUM 40 MG: 40 TABLET, FILM COATED ORAL at 20:09

## 2017-06-04 ASSESSMENT — COGNITIVE AND FUNCTIONAL STATUS - GENERAL
STANDING UP FROM CHAIR USING ARMS: A LITTLE
DAILY ACTIVITIY SCORE: 19
SUGGESTED CMS G CODE MODIFIER DAILY ACTIVITY: CK
MOVING FROM LYING ON BACK TO SITTING ON SIDE OF FLAT BED: A LITTLE
MOVING TO AND FROM BED TO CHAIR: A LITTLE
HELP NEEDED FOR BATHING: A LITTLE
TURNING FROM BACK TO SIDE WHILE IN FLAT BAD: A LITTLE
PERSONAL GROOMING: A LITTLE
SUGGESTED CMS G CODE MODIFIER MOBILITY: CK
TOILETING: A LITTLE
MOBILITY SCORE: 18
CLIMB 3 TO 5 STEPS WITH RAILING: A LITTLE
DRESSING REGULAR LOWER BODY CLOTHING: A LITTLE
WALKING IN HOSPITAL ROOM: A LITTLE
DRESSING REGULAR UPPER BODY CLOTHING: A LITTLE

## 2017-06-04 ASSESSMENT — PAIN SCALES - GENERAL
PAINLEVEL_OUTOF10: 0
PAINLEVEL_OUTOF10: 0
PAINLEVEL_OUTOF10: 4
PAINLEVEL_OUTOF10: 0

## 2017-06-04 ASSESSMENT — GAIT ASSESSMENTS
DEVIATION: BRADYKINETIC
DISTANCE (FEET): 300
ASSISTIVE DEVICE: WHEELCHAIR PUSH
GAIT LEVEL OF ASSIST: CONTACT GUARD ASSIST

## 2017-06-04 ASSESSMENT — ENCOUNTER SYMPTOMS
NEUROLOGICAL NEGATIVE: 1
GASTROINTESTINAL NEGATIVE: 1
CONSTITUTIONAL NEGATIVE: 1
RESPIRATORY NEGATIVE: 1
CARDIOVASCULAR NEGATIVE: 1
EYES NEGATIVE: 1
MUSCULOSKELETAL NEGATIVE: 1
PSYCHIATRIC NEGATIVE: 1

## 2017-06-04 ASSESSMENT — ACTIVITIES OF DAILY LIVING (ADL): TOILETING: INDEPENDENT

## 2017-06-04 NOTE — CARE PLAN
Problem: Post Op Day 3 CABG/Heart Valve replacement  Goal: Optimal care of the post op CABG/Heart Valve replacement post op day 3  Intervention: Up in chair for all meals  Pt in chair for breakfast and states he will be in chair for dinner. See note on lunchtime meal.  Intervention: Ambulate, increasing the distance each time x 3 and before bed  Pt ambulated x3 with increasing distance each time. Pt tolerated well while pushing wheelchair.  Intervention: IS q 1 hour while awake and record best IS volume  Max IS was 1500.  Intervention: Consider removal of nassar, chest tube and pacer wire if not already done  No nassar or chest tube present. Pacer wires removed by ENA Louis.

## 2017-06-04 NOTE — PROGRESS NOTES
Pt was up in chair for breakfast and has completed multiple walks. Pt states he wishes to stay in bed for lunch. RN educated pt about sitting in chair for all meals. Pt verbalized understanding and wanted to stay in bed. Pt at 75 degree angle while eating in bed.

## 2017-06-04 NOTE — THERAPY
"Occupational Therapy Evaluation completed.   Functional Status:  Mod A supine to sit.  Mod A LB dressing.  Min A sit to stand.  CGA for toilet transfer and to wash hands standing at sink.  Pt walked hallway with WC push.  Pt required frequent prompts to follow sternal precautions.  Pt left up in chair at end of session.  Plan of Care: Will benefit from Occupational Therapy 3 times per week  Discharge Recommendations:  Equipment: Will Continue to Assess for Equipment Needs. Post-acute therapy Discharge to home with outpatient or home health for additional skilled therapy services.    See \"Rehab Therapy-Acute\" Patient Summary Report for complete documentation.    "

## 2017-06-04 NOTE — PROGRESS NOTES
Cardiovascular Surgery Progress Note    Name: Evans Díaz  MRN: [id number]  : 1950  Admit Date: 2017  5:04 AM  Procedure:  Procedure(s) and Anesthesia Type:     * MULTIPLE CORONARY ARTERY BYPASS x2 ENDO VEIN HARVEST AND PREVENA DRESSING - General     * HELLEN - INTRA-OPERATIVE - General  3 Day Post-Op    Vitals:  Patient Vitals for the past 8 hrs:   Temp SpO2 O2 Delivery O2 (LPM) Pulse Resp NIBP Weight   17 0800 37.3 °C (99.2 °F) 95 % Silicone Nasal Cannula 2 90 18 125/69 mmHg -   17 0600 - 94 % Silicone Nasal Cannula 2.5 (!) 103 - - -   17 0500 - 94 % - - 99 - - 84.5 kg (186 lb 4.6 oz)   17 0400 36.9 °C (98.5 °F) 94 % Silicone Nasal Cannula 2.5 98 - 100/68 mmHg -   17 0300 - 94 % - - 94 - - -   17 0200 - 94 % Silicone Nasal Cannula 2.5 96 - - -   17 0100 - 94 % - - 95 - 103/71 mmHg -     Temp (24hrs), Av.1 °C (98.7 °F), Min:36.8 °C (98.2 °F), Max:37.3 °C (99.2 °F)      Respiratory:    Respiration: 18, Pulse Oximetry: 95 %, O2 Daily Delivery Respiratory : Silicone Nasal Cannula     Chest Tube Drains:          Fluids:    Intake/Output Summary (Last 24 hours) at 17 0839  Last data filed at 17 0600   Gross per 24 hour   Intake   1300 ml   Output    790 ml   Net    510 ml     Admit weight: Weight: 84.5 kg (186 lb 4.6 oz)  Current weight: Weight: 84.5 kg (186 lb 4.6 oz) (17 0500)    Labs:  Recent Labs      17   0001  17   0400  17   0530   WBC  14.4*  16.9*  14.8*   RBC  3.65*  3.64*  3.61*   HEMOGLOBIN  11.2*  11.0*  10.8*   HEMATOCRIT  33.6*  32.3*  32.0*   MCV  91.0  88.7  88.6   MCH  29.9  30.2  29.9   MCHC  32.8*  34.1  33.8   RDW  46.2  44.8  43.8   PLATELETCT  210  201  216   MPV  10.0  10.1  10.2         Recent Labs      17   0001  17   0458  17   0400  17   0530   SODIUM  136   --   130*  131*   POTASSIUM  4.2  4.2  4.1  3.6   CHLORIDE  106   --   97  96   CO2  24   --   26  28   GLUCOSE  106*    --   163*  100*   BUN  32*   --   36*  35*   CREATININE  1.37   --   1.32  1.30   CALCIUM  8.0*   --   9.2  8.7     Recent Labs      06/01/17   1218   APTT  35.6   INR  1.31*       Medications:  • [START ON 6/5/2017] furosemide  20 mg     • [START ON 6/5/2017] potassium chloride (KCl)  10 mEq     • carvedilol  6.25 mg     • enalapril  5 mg     • tiotropium  1 Cap     • aspirin  81 mg     • docusate sodium  100 mg      And   • senna-docusate  1 Tab     • enoxaparin  40 mg     • atorvastatin  40 mg     • clopidogrel  75 mg         Exam:   Review of Systems   Constitutional: Negative.    HENT: Negative.    Eyes: Negative.    Respiratory: Negative.    Cardiovascular: Negative.    Gastrointestinal: Negative.    Genitourinary: Negative.    Musculoskeletal: Negative.    Skin: Negative.    Neurological: Negative.    Psychiatric/Behavioral: Negative.        Physical Exam   Constitutional: He is oriented to person, place, and time. He appears well-developed and well-nourished.   HENT:   Head: Normocephalic and atraumatic.   Eyes: Pupils are equal, round, and reactive to light.   Neck: Normal range of motion. Neck supple.   Cardiovascular: Normal rate and regular rhythm.    Pulmonary/Chest: Effort normal.   Decreased at bases.    Abdominal: Soft. Bowel sounds are normal.   Musculoskeletal: Normal range of motion.   Neurological: He is alert and oriented to person, place, and time.   Skin: Skin is warm and dry.   Wounds CDI   Psychiatric: He has a normal mood and affect.       Quality Measures:   EKG reviewed, Medications reviewed, Radiology images reviewed and Labs reviewed  Mayorga catheter: No Mayorga  Central line in place: Need for access    DVT Prophylaxis: Enoxaparin (Lovenox)  DVT prophylaxis - mechanical: SCDs  Ulcer prophylaxis: Yes    Assessed for rehab: Patient returned to prior level of function, rehabilitation not indicated at this time      Assessment/Plan:  Extubated, HDS, no drips, chest tube output minimal and  negative for air leak.  Neuro grossly intact.  Plan: Transition to tele status.  DC chest tubes.  DC nassar.  POD 2 HDS/HTN, NSR/ST, +3.5 L, wts up, 3 L NC.  Renal function stable. Surgical incisions CDI.  PLAN: Diurese. Increase BB, add ACE (cardiomyopathy). IS/AMB. 2 view CXR in am.   POD 3 HDS, NSR/ST, wts at baseline, renal stable, passing gas, no BM yet.  PLAN: DC temp wires, 2 view CXR today, bowel protocol.   Active Hospital Problems    Diagnosis   • CAD (coronary artery disease) [I25.10]

## 2017-06-04 NOTE — CARE PLAN
Problem: Post Op Day 3 CABG/Heart Valve replacement  Goal: Optimal care of the post op CABG/Heart Valve replacement post op day 3  Outcome: PROGRESSING AS EXPECTED  Intervention: Daily Weights  Completed.   Intervention: Shower daily and clean incisions twice daily with soap and water  Will be completed during day shift.   Intervention: Up in chair for all meals  Up to the chair for breakfast.   Intervention: Ambulate, increasing the distance each time x 3 and before bed  1 lap around unit  Intervention: IS q 1 hour while awake and record best IS volume  ; baseline 2800  Intervention: Consider removal of nassar, chest tube and pacer wire if not already done  Completed; wires capped.

## 2017-06-04 NOTE — THERAPY
"Physical Therapy Evaluation completed.   Bed Mobility:  Supine to Sit: Minimal Assist  Transfers: Sit to Stand: Minimal Assist  Gait: Level Of Assist: Contact Guard Assist with W/C push       Plan of Care: Will benefit from Physical Therapy 3 times per week  Discharge Recommendations: Equipment: Will Continue to Assess for Equipment Needs. Post-acute therapy Discharge to home with outpatient or home health for additional skilled therapy services.    See \"Rehab Therapy-Acute\" Patient Summary Report for complete documentation.     "

## 2017-06-04 NOTE — CARE PLAN
Problem: Nutritional:  Goal: Patient to verbalize or demonstrate understanding of diet  Outcome: MET Date Met:  06/04/17

## 2017-06-05 LAB
ANION GAP SERPL CALC-SCNC: 6 MMOL/L (ref 0–11.9)
BUN SERPL-MCNC: 34 MG/DL (ref 8–22)
CALCIUM SERPL-MCNC: 8.8 MG/DL (ref 8.5–10.5)
CHLORIDE SERPL-SCNC: 98 MMOL/L (ref 96–112)
CO2 SERPL-SCNC: 27 MMOL/L (ref 20–33)
CREAT SERPL-MCNC: 1.2 MG/DL (ref 0.5–1.4)
ERYTHROCYTE [DISTWIDTH] IN BLOOD BY AUTOMATED COUNT: 44.2 FL (ref 35.9–50)
GFR SERPL CREATININE-BSD FRML MDRD: 60 ML/MIN/1.73 M 2
GLUCOSE SERPL-MCNC: 134 MG/DL (ref 65–99)
HCT VFR BLD AUTO: 30.8 % (ref 42–52)
HGB BLD-MCNC: 10.1 G/DL (ref 14–18)
MCH RBC QN AUTO: 29.4 PG (ref 27–33)
MCHC RBC AUTO-ENTMCNC: 32.8 G/DL (ref 33.7–35.3)
MCV RBC AUTO: 89.8 FL (ref 81.4–97.8)
PLATELET # BLD AUTO: 239 K/UL (ref 164–446)
PMV BLD AUTO: 9.8 FL (ref 9–12.9)
POTASSIUM SERPL-SCNC: 3.8 MMOL/L (ref 3.6–5.5)
RBC # BLD AUTO: 3.43 M/UL (ref 4.7–6.1)
SODIUM SERPL-SCNC: 131 MMOL/L (ref 135–145)
WBC # BLD AUTO: 9.7 K/UL (ref 4.8–10.8)

## 2017-06-05 PROCEDURE — 94668 MNPJ CHEST WALL SBSQ: CPT

## 2017-06-05 PROCEDURE — 700111 HCHG RX REV CODE 636 W/ 250 OVERRIDE (IP): Performed by: CLINICAL NURSE SPECIALIST

## 2017-06-05 PROCEDURE — 85027 COMPLETE CBC AUTOMATED: CPT

## 2017-06-05 PROCEDURE — 700102 HCHG RX REV CODE 250 W/ 637 OVERRIDE(OP): Performed by: NURSE PRACTITIONER

## 2017-06-05 PROCEDURE — 302252 SYSTEM PREVENA CANISTER 45ML DISP VAC: Performed by: THORACIC SURGERY (CARDIOTHORACIC VASCULAR SURGERY)

## 2017-06-05 PROCEDURE — 770020 HCHG ROOM/CARE - TELE (206)

## 2017-06-05 PROCEDURE — 700111 HCHG RX REV CODE 636 W/ 250 OVERRIDE (IP): Performed by: NURSE PRACTITIONER

## 2017-06-05 PROCEDURE — A9270 NON-COVERED ITEM OR SERVICE: HCPCS | Performed by: NURSE PRACTITIONER

## 2017-06-05 PROCEDURE — 80048 BASIC METABOLIC PNL TOTAL CA: CPT

## 2017-06-05 PROCEDURE — 700112 HCHG RX REV CODE 229: Performed by: NURSE PRACTITIONER

## 2017-06-05 RX ORDER — TIOTROPIUM BROMIDE 18 UG/1
1 CAPSULE ORAL; RESPIRATORY (INHALATION) DAILY
Status: DISCONTINUED | OUTPATIENT
Start: 2017-06-05 | End: 2017-06-06 | Stop reason: HOSPADM

## 2017-06-05 RX ADMIN — ONDANSETRON 4 MG: 2 INJECTION INTRAMUSCULAR; INTRAVENOUS at 17:42

## 2017-06-05 RX ADMIN — TIOTROPIUM BROMIDE 1 CAPSULE: 18 CAPSULE ORAL; RESPIRATORY (INHALATION) at 08:11

## 2017-06-05 RX ADMIN — FUROSEMIDE 20 MG: 10 INJECTION, SOLUTION INTRAMUSCULAR; INTRAVENOUS at 08:02

## 2017-06-05 RX ADMIN — ATORVASTATIN CALCIUM 40 MG: 40 TABLET, FILM COATED ORAL at 19:54

## 2017-06-05 RX ADMIN — ENOXAPARIN SODIUM 40 MG: 100 INJECTION SUBCUTANEOUS at 08:05

## 2017-06-05 RX ADMIN — CARVEDILOL 6.25 MG: 6.25 TABLET, FILM COATED ORAL at 16:55

## 2017-06-05 RX ADMIN — CLOPIDOGREL 75 MG: 75 TABLET, FILM COATED ORAL at 08:01

## 2017-06-05 RX ADMIN — ENALAPRIL MALEATE 5 MG: 5 TABLET ORAL at 08:01

## 2017-06-05 RX ADMIN — OXYCODONE HYDROCHLORIDE 5 MG: 5 TABLET ORAL at 20:15

## 2017-06-05 RX ADMIN — ASPIRIN 81 MG: 81 TABLET, CHEWABLE ORAL at 08:01

## 2017-06-05 RX ADMIN — CARVEDILOL 6.25 MG: 6.25 TABLET, FILM COATED ORAL at 08:01

## 2017-06-05 RX ADMIN — POTASSIUM CHLORIDE 10 MEQ: 750 TABLET, FILM COATED, EXTENDED RELEASE ORAL at 08:02

## 2017-06-05 RX ADMIN — DOCUSATE SODIUM 100 MG: 100 CAPSULE ORAL at 08:00

## 2017-06-05 ASSESSMENT — PAIN SCALES - GENERAL
PAINLEVEL_OUTOF10: 0
PAINLEVEL_OUTOF10: 6
PAINLEVEL_OUTOF10: 0

## 2017-06-05 ASSESSMENT — ENCOUNTER SYMPTOMS
NEUROLOGICAL NEGATIVE: 1
CONSTITUTIONAL NEGATIVE: 1
PSYCHIATRIC NEGATIVE: 1
RESPIRATORY NEGATIVE: 1
GASTROINTESTINAL NEGATIVE: 1
EYES NEGATIVE: 1
CARDIOVASCULAR NEGATIVE: 1
MUSCULOSKELETAL NEGATIVE: 1

## 2017-06-05 NOTE — DISCHARGE PLANNING
Received call from [facility], and [facility] [place] both have accepted patient.

## 2017-06-05 NOTE — CARE PLAN
Problem: Oxygenation:  Goal: Maintain adequate oxygenation dependent on patient condition  2LNC    Problem: Hyperinflation:  Goal: Prevent or improve atelectasis  PEP QID   IS value for DAY: 1500ml   NOC: 1250ml on NOC   60%: 1710ml

## 2017-06-05 NOTE — FACE TO FACE
Face to Face Supporting Documentation - Home Health    The encounter with this patient was in whole or in part the primary reason for home health admission.    Date of encounter:   Patient:                    MRN:                       YOB: 2019  Evans Díaz  [id number]  1950     Home health to see patient for:  Skilled Nursing care for assessment, interventions & education    Skilled need for:  Surgical Aftercare wound care, vital signs    Skilled nursing interventions to include:  Wound Care    Homebound status evidenced by:  Need the aid of supportive devices such as crutches, canes, wheelchairs or walkers. Leaving home requires a considerable and taxing effort. There is a normal inability to leave the home.    Community Physician to provide follow up care: Pcp Pt States None     Optional Interventions? No      I certify the face to face encounter for this home health care referral meets the CMS requirements and the encounter/clinical assessment with the patient was, in whole, or in part, for the medical condition(s) listed above, which is the primary reason for home health care. Based on my clinical findings: the service(s) are medically necessary, support the need for home health care, and the homebound criteria are met.  I certify that this patient has had a face to face encounter by myself.  CHRISTOPHER KernsN. - NPI: [id number]

## 2017-06-05 NOTE — CARE PLAN
Problem: Post Op Day 4 CABG/Heart Valve Replacement  Goal: Optimal care of the Post Op CABG/Heart Valve replacement Post Op Day 4  Intervention: Daily Weights  Completed.  Intervention: Shower daily and clean incisions twice daily with soap and water  Completed. Tolerated well.  Intervention: Up in chair for all meals  Completed.  Intervention: Ambulate, increasing the distance each time x 3 and before bed   6/5: 3 walks and 1 shower. Tolerated well. Standby assist. 2L O2 NC. Ambulated around the entire unit. To ambulate before bed with night shift.  Intervention: IS q 1 hour while awake and record best IS volume  IS best 1750  Intervention: Consider removal of nassar, chest tube and pacer wire if not already done  removed  Intervention: Discharge Education  Discharge tomorrow, some education provided.

## 2017-06-05 NOTE — PROGRESS NOTES
Cardiovascular Surgery Progress Note    Name: Evans Díaz  MRN: [id number]  : 1950  Admit Date: 2017  5:04 AM  Procedure:  Procedure(s) and Anesthesia Type:     * MULTIPLE CORONARY ARTERY BYPASS x2 ENDO VEIN HARVEST AND PREVENA DRESSING - General     * HELLEN - INTRA-OPERATIVE - General  4 Day Post-Op    Vitals:  Patient Vitals for the past 8 hrs:   Temp SpO2 O2 Delivery O2 (LPM) Pulse Heart Rate (Monitored) Resp NIBP Weight   17 0800 36.8 °C (98.2 °F) 96 % Silicone Nasal Cannula 2 92 93 20 122/74 mmHg -   17 0600 - - Silicone Nasal Cannula 2 - - - - 83.1 kg (183 lb 3.2 oz)   17 0500 - 93 % - - 98 96 18 - -   17 0400 36.9 °C (98.5 °F) 96 % Silicone Nasal Cannula 2 88 88 12 114/65 mmHg -   17 0300 - 95 % - - 91 91 (!) 11 - -     Temp (24hrs), Av °C (98.6 °F), Min:36.8 °C (98.2 °F), Max:37.3 °C (99.2 °F)      Respiratory:    Respiration: 20, Pulse Oximetry: 96 %, O2 Daily Delivery Respiratory : Silicone Nasal Cannula     Chest Tube Drains:          Fluids:    Intake/Output Summary (Last 24 hours) at 17 1052  Last data filed at 17 0600   Gross per 24 hour   Intake    600 ml   Output      0 ml   Net    600 ml     Admit weight: Weight: 84.5 kg (186 lb 4.6 oz)  Current weight: Weight: 83.1 kg (183 lb 3.2 oz) (17 0600)    Labs:  Recent Labs      17   0400  17   0530  17   0520   WBC  16.9*  14.8*  9.7   RBC  3.64*  3.61*  3.43*   HEMOGLOBIN  11.0*  10.8*  10.1*   HEMATOCRIT  32.3*  32.0*  30.8*   MCV  88.7  88.6  89.8   MCH  30.2  29.9  29.4   MCHC  34.1  33.8  32.8*   RDW  44.8  43.8  44.2   PLATELETCT  201  216  239   MPV  10.1  10.2  9.8         Recent Labs      17   0400  17   0530  17   0520   SODIUM  130*  131*  131*   POTASSIUM  4.1  3.6  3.8   CHLORIDE  97  96  98   CO2  26  28  27   GLUCOSE  163*  100*  134*   BUN  36*  35*  34*   CREATININE  1.32  1.30  1.20   CALCIUM  9.2  8.7  8.8           Medications:  •  tiotropium  1 Cap     • furosemide  20 mg     • potassium chloride (KCl)  10 mEq     • carvedilol  6.25 mg     • enalapril  5 mg     • aspirin  81 mg     • docusate sodium  100 mg      And   • senna-docusate  1 Tab     • enoxaparin  40 mg     • atorvastatin  40 mg     • clopidogrel  75 mg         Exam:   Review of Systems   Constitutional: Negative.    HENT: Negative.    Eyes: Negative.    Respiratory: Negative.    Cardiovascular: Negative.    Gastrointestinal: Negative.    Genitourinary: Negative.    Musculoskeletal: Negative.    Skin: Negative.    Neurological: Negative.    Psychiatric/Behavioral: Negative.        Physical Exam   Constitutional: He is oriented to person, place, and time. He appears well-developed and well-nourished.   HENT:   Head: Normocephalic and atraumatic.   Eyes: Pupils are equal, round, and reactive to light.   Neck: Normal range of motion. Neck supple.   Cardiovascular: Normal rate and regular rhythm.    Pulmonary/Chest: Effort normal.   Decreased at bases.    Abdominal: Soft. Bowel sounds are normal.   Musculoskeletal: Normal range of motion.   Neurological: He is alert and oriented to person, place, and time.   Skin: Skin is warm and dry.   Wounds CDI   Psychiatric: He has a normal mood and affect.       Quality Measures:   EKG reviewed, Medications reviewed, Radiology images reviewed and Labs reviewed  Nassar catheter: No Nassar  Central line in place: Need for access    DVT Prophylaxis: Enoxaparin (Lovenox)  DVT prophylaxis - mechanical: SCDs  Ulcer prophylaxis: Yes    Assessed for rehab: Patient returned to prior level of function, rehabilitation not indicated at this time      Assessment/Plan:  Extubated, HDS, no drips, chest tube output minimal and negative for air leak.  Neuro grossly intact.  Plan: Transition to tele status.  DC chest tubes.  DC nassar.  POD 2 HDS/HTN, NSR/ST, +3.5 L, wts up, 3 L NC.  Renal function stable. Surgical incisions CDI.  PLAN: Diurese. Increase BB, add ACE  (cardiomyopathy). IS/AMB. 2 view CXR in am.   POD 3 HDS, NSR/ST, wts at baseline, renal stable, passing gas, no BM yet.  PLAN: DC temp wires, 2 view CXR today, bowel protocol.   POD 4 HDS, SR, CXR- sm left effusion/aterlectasis- enc IS/amb, plan home vs SNF in am    Patient seen, examined and plan reviewed with midlevel provider. I agree with the plan.      Active Hospital Problems    Diagnosis   • CAD (coronary artery disease) [I25.10]

## 2017-06-05 NOTE — DISCHARGE PLANNING
CABG x2    6/1.      This chart has been reviewed by the hospital case management team. Based on the chart review and the patient's current medical status, the anticipated discharge plan is home with home health.     [facility] insurance.      Patient is recently  1 month ago.  Patient resides with his DTR and her very small 3 children.  Single  story home.  Patient is aware he cannot drive for 4 weeks.  He also does not have PCP but could re-establish himself with Dr Landers.   DTR Does not drive and is unemployed so she is in the home during the day caring for the children.     Discussed with [facility] APN.  Patient may be willing to DC to SNF due to home situation.  I met with this very pleasant gentleman an discussed DC to C vs SNF.   Patient stated his wife stayed at a local SNF an mentioned [facility].  Patient stated a short stay at SNF would be beneficial for his DTR until he is stronger.      Choice form completed and PASRR completed.  [facility] 1st choice, [facility] 2nd and [facility] 3rd choice.     Anticipate DC to Skilled tomorrow.  Wait for medical clearance.

## 2017-06-05 NOTE — DISCHARGE PLANNING
Received choice form from [facility] Don at 1205.  Referral sent to [facility], [facility] and [facility] at 1217 on 06-05-17.

## 2017-06-05 NOTE — PROGRESS NOTES
In report was told patients Prevena wound vac was beeping. Assessed, cartridge full. Ordered new cartridge, Central supply called and reported no cartridges in hospital supply. Bere Lynch APN to bedside, ordered wound vac to be removed and gauze dressing to be placed over sternum, also ordered chest tube incisions to be open to air.

## 2017-06-05 NOTE — CARE PLAN
Problem: Hyperinflation:  Goal: Prevent or improve atelectasis  Outcome: PROGRESSING AS EXPECTED  Respiratory Therapy Update    Interdisciplinary Plan of Care-Goals (Indications)  Obstructive Ventilatory Defect or Pulmonary Disease without Obvious Obstruction: History / Diagnosis (06/02/17 2010)  Blunt Chest Indications: Thoracic Surgery (06/04/17 1610)  Hyperinflation Protocol Indications: Pre or Post-op Abdominal, Thoracic or Orthopedic Surgery (06/04/17 1610)  Interdisciplinary Plan of Care-Outcomes   Bronchodilator Outcome: Diminished Wheezing and Volume of Air Movement Increased (06/02/17 2010)  Blunt Chest IS Outcome: Patient at Stable Baseline (06/04/17 1610)  Hyperinflation Protocol Goals/Outcome: Stable Vital Capacity x24 hrs and Patient Understands / uses I.S. (06/04/17 1610)    #PEP/CPT (Manual) Initial: Initial (06/01/17 2048)     O2 (LPM): 2 (06/04/17 1610)  O2 Daily Delivery Respiratory : Silicone Nasal Cannula (06/04/17 1610)    Breath Sounds  Pre/Post Intervention: Pre Intervention Assessment (06/02/17 0537)  RUL Breath Sounds: Clear (06/04/17 1610)  RML Breath Sounds: Diminished (06/04/17 1610)  RLL Breath Sounds: Diminished (06/04/17 1610)  ЮЛИЯ Breath Sounds: Clear (06/04/17 1610)  LLL Breath Sounds: Diminished (06/04/17 1610)

## 2017-06-05 NOTE — CARE PLAN
Problem: Post Op Day 4 CABG/Heart Valve Replacement  Goal: Optimal care of the Post Op CABG/Heart Valve replacement Post Op Day 4  Outcome: PROGRESSING AS EXPECTED  Intervention: Daily Weights  Completed.   Intervention: Shower daily and clean incisions twice daily with soap and water  Will be done during day shift.   Intervention: Up in chair for all meals  Up to chair for breakfast.   Intervention: Ambulate, increasing the distance each time x 3 and before bed  1 Lap around unit.  Intervention: IS q 1 hour while awake and record best IS volume  1250 Best  Intervention: Consider removal of nassar, chest tube and pacer wire if not already done  Completed.

## 2017-06-06 VITALS
OXYGEN SATURATION: 96 % | HEART RATE: 98 BPM | HEIGHT: 69 IN | DIASTOLIC BLOOD PRESSURE: 74 MMHG | RESPIRATION RATE: 14 BRPM | WEIGHT: 183.2 LBS | SYSTOLIC BLOOD PRESSURE: 149 MMHG | TEMPERATURE: 98 F | BODY MASS INDEX: 27.13 KG/M2

## 2017-06-06 LAB
ANION GAP SERPL CALC-SCNC: 8 MMOL/L (ref 0–11.9)
BUN SERPL-MCNC: 30 MG/DL (ref 8–22)
CALCIUM SERPL-MCNC: 8.7 MG/DL (ref 8.5–10.5)
CHLORIDE SERPL-SCNC: 98 MMOL/L (ref 96–112)
CO2 SERPL-SCNC: 27 MMOL/L (ref 20–33)
CREAT SERPL-MCNC: 1.14 MG/DL (ref 0.5–1.4)
ERYTHROCYTE [DISTWIDTH] IN BLOOD BY AUTOMATED COUNT: 42.7 FL (ref 35.9–50)
GFR SERPL CREATININE-BSD FRML MDRD: >60 ML/MIN/1.73 M 2
GLUCOSE SERPL-MCNC: 144 MG/DL (ref 65–99)
HCT VFR BLD AUTO: 33.3 % (ref 42–52)
HGB BLD-MCNC: 11.1 G/DL (ref 14–18)
MCH RBC QN AUTO: 29.9 PG (ref 27–33)
MCHC RBC AUTO-ENTMCNC: 33.3 G/DL (ref 33.7–35.3)
MCV RBC AUTO: 89.8 FL (ref 81.4–97.8)
PLATELET # BLD AUTO: 292 K/UL (ref 164–446)
PMV BLD AUTO: 9.7 FL (ref 9–12.9)
POTASSIUM SERPL-SCNC: 3.8 MMOL/L (ref 3.6–5.5)
RBC # BLD AUTO: 3.71 M/UL (ref 4.7–6.1)
SODIUM SERPL-SCNC: 133 MMOL/L (ref 135–145)
WBC # BLD AUTO: 11.1 K/UL (ref 4.8–10.8)

## 2017-06-06 PROCEDURE — 700111 HCHG RX REV CODE 636 W/ 250 OVERRIDE (IP): Performed by: NURSE PRACTITIONER

## 2017-06-06 PROCEDURE — 700102 HCHG RX REV CODE 250 W/ 637 OVERRIDE(OP): Performed by: NURSE PRACTITIONER

## 2017-06-06 PROCEDURE — 85027 COMPLETE CBC AUTOMATED: CPT

## 2017-06-06 PROCEDURE — A9270 NON-COVERED ITEM OR SERVICE: HCPCS | Performed by: NURSE PRACTITIONER

## 2017-06-06 PROCEDURE — 80048 BASIC METABOLIC PNL TOTAL CA: CPT

## 2017-06-06 RX ORDER — FUROSEMIDE 10 MG/ML
20 INJECTION INTRAMUSCULAR; INTRAVENOUS DAILY
Refills: 0
Start: 2017-06-06 | End: 2017-08-03

## 2017-06-06 RX ORDER — ATORVASTATIN CALCIUM 40 MG/1
40 TABLET, FILM COATED ORAL
Qty: 30 TAB
Start: 2017-06-06 | End: 2017-08-04 | Stop reason: SDUPTHER

## 2017-06-06 RX ORDER — PSEUDOEPHEDRINE HCL 30 MG
100 TABLET ORAL EVERY MORNING
Qty: 60 CAP
Start: 2017-06-06 | End: 2019-06-12

## 2017-06-06 RX ORDER — HYDROCODONE BITARTRATE AND ACETAMINOPHEN 5; 325 MG/1; MG/1
1-2 TABLET ORAL EVERY 6 HOURS PRN
Qty: 20 TAB | Refills: 0
Start: 2017-06-06 | End: 2019-06-12

## 2017-06-06 RX ORDER — IPRATROPIUM BROMIDE AND ALBUTEROL SULFATE 2.5; .5 MG/3ML; MG/3ML
3 SOLUTION RESPIRATORY (INHALATION) EVERY 4 HOURS PRN
Start: 2017-06-06 | End: 2019-06-12

## 2017-06-06 RX ORDER — POTASSIUM CHLORIDE 750 MG/1
10 TABLET, FILM COATED, EXTENDED RELEASE ORAL DAILY
Qty: 60 TAB | Refills: 3
Start: 2017-06-06 | End: 2017-08-03

## 2017-06-06 RX ORDER — ALBUTEROL SULFATE 90 UG/1
2 AEROSOL, METERED RESPIRATORY (INHALATION) EVERY 4 HOURS PRN
Qty: 8.5 G
Start: 2017-06-06

## 2017-06-06 RX ORDER — ENALAPRIL MALEATE 5 MG/1
10 TABLET ORAL DAILY
Qty: 30 TAB
Start: 2017-06-06 | End: 2017-08-03

## 2017-06-06 RX ORDER — TIOTROPIUM BROMIDE 18 UG/1
18 CAPSULE ORAL; RESPIRATORY (INHALATION) DAILY
Qty: 30 CAP | Refills: 3
Start: 2017-06-06 | End: 2017-11-02 | Stop reason: SDUPTHER

## 2017-06-06 RX ORDER — CLOPIDOGREL BISULFATE 75 MG/1
75 TABLET ORAL DAILY
Qty: 30 TAB
Start: 2017-06-06 | End: 2017-08-04 | Stop reason: SDUPTHER

## 2017-06-06 RX ADMIN — ENALAPRIL MALEATE 5 MG: 5 TABLET ORAL at 07:46

## 2017-06-06 RX ADMIN — TIOTROPIUM BROMIDE 1 CAPSULE: 18 CAPSULE ORAL; RESPIRATORY (INHALATION) at 07:47

## 2017-06-06 RX ADMIN — HYDROCODONE BITARTRATE AND ACETAMINOPHEN 1 TABLET: 5; 325 TABLET ORAL at 11:25

## 2017-06-06 RX ADMIN — POTASSIUM CHLORIDE 10 MEQ: 750 TABLET, FILM COATED, EXTENDED RELEASE ORAL at 07:46

## 2017-06-06 RX ADMIN — CARVEDILOL 6.25 MG: 6.25 TABLET, FILM COATED ORAL at 07:45

## 2017-06-06 RX ADMIN — CLOPIDOGREL 75 MG: 75 TABLET, FILM COATED ORAL at 07:45

## 2017-06-06 RX ADMIN — ENOXAPARIN SODIUM 40 MG: 100 INJECTION SUBCUTANEOUS at 07:45

## 2017-06-06 RX ADMIN — ASPIRIN 81 MG: 81 TABLET, CHEWABLE ORAL at 07:45

## 2017-06-06 RX ADMIN — FUROSEMIDE 20 MG: 10 INJECTION, SOLUTION INTRAMUSCULAR; INTRAVENOUS at 07:46

## 2017-06-06 ASSESSMENT — PAIN SCALES - GENERAL
PAINLEVEL_OUTOF10: 0
PAINLEVEL_OUTOF10: 5
PAINLEVEL_OUTOF10: 0

## 2017-06-06 ASSESSMENT — ENCOUNTER SYMPTOMS
NEUROLOGICAL NEGATIVE: 1
PSYCHIATRIC NEGATIVE: 1
CONSTITUTIONAL NEGATIVE: 1
CARDIOVASCULAR NEGATIVE: 1
GASTROINTESTINAL NEGATIVE: 1
MUSCULOSKELETAL NEGATIVE: 1
EYES NEGATIVE: 1
RESPIRATORY NEGATIVE: 1

## 2017-06-06 NOTE — DISCHARGE INSTRUCTIONS
Discharge Instructions    Discharged to group home by medical transportation with escort. Discharged via wheelchair, hospital escort: Yes.  Special equipment needed: Oxygen    Be sure to schedule a follow-up appointment with your primary care doctor or any specialists as instructed.     Discharge Plan:   Smoking Cessation Offered: Patient Counseled  Influenza Vaccine Indication: Not indicated: Previously immunized this influenza season and > 8 years of age ([facility])    I understand that a diet low in cholesterol, fat, and sodium is recommended for good health. Unless I have been given specific instructions below for another diet, I accept this instruction as my diet prescription.   Other diet: cardiac    Special Instructions: heart surgery    Cardiac Surgery Discharge Instructions/[facility]    Activity:  1. NO driving for 4 weeks after surgery. You may ride as a passenger.  2. NO lifting of any item over 10 lbs (e.g. gallon of milk) for 6 weeks after surgery.  Do not raise both arms above head, only one at a time.  Do not push or pull with your arms.  3. Walk as much as possible! Walk a minimum of 4 times per day. Depending on your fatigue and comfort level, you may walk as much as you wish. There is no maximum.  4. Other physical activities (sex, housework, gardening, etc.) are OK after 4 weeks.  5. Continue using incentive spirometer for 2 weeks.  If you are going home on oxygen and you were not on oxygen prior to surgery, keep using until you are oxygen free.  6. Weigh daily and write it down.  Call your doctor for a weight gain of 2-4 lbs in 1-2 days.    Incision Care:  1. SHOWER EVERYDAY-no baths. Make sure to clean your incision(s) twice daily with plain, perfume and dye-free soap (if you shower in the morning, stand at the sink at bedtime and clean incision with soap and water). Then pat incision(s) dry with clean towel. Avoid creams or lotions on the incision(s).    2. If there is any increase in  redness or swelling, or separation of the incision line, or thick drainage* from any of the incisions, call [facility] ([phone]). * Clear, thin drainage is not abnormal especially from the leg incision and/or chest tube sites.                    3. Continue to wear your [place] Stockings for 4 weeks on the leg(s) with the incision(s) or swelling. You may take off the stocking(s) when in bed or when the leg(s) are elevated.    General Instructions:  1. You have been referred to Cardiac Rehab which is highly recommended for you after heart surgery. You can start Cardiac Rehab 30 days after surgery.  If you do not have an appointment at the time of discharge call [phone] to schedule an appointment.  2. Your Primary Care Doctor typically handles Home Oxygen. The Home Oxygen service that drops off your tanks should be checking your oxygen saturation levels. Oxygen may be stopped when you are > 90 % saturated on room air. Check with your Primary Care Doctor if you are unsure.    Prescription Refills/Questions:   Call your usual Pharmacy for ALL refills   1. Pain medication questions only: Call [facility] at [phone].  (Remember that refills may require additional physician approval and will need at least 24 hours’ notice. Please call for refills on Mondays through Fridays from 9 am to 4 pm).  2. For all other medications (except pain medication): Call your Cardiology Group or Primary Care Doctor (not [facility]) for refills or questions.    [facility] IS ALWAYS AVAILABLE TO ANSWER YOUR QUESTIONS. DO NOT HESITATE TO CALL!    · Is patient discharged on Warfarin / Coumadin?   No     · Is patient Post Blood Transfusion?  No    Depression / Suicide Risk    As you are discharged from this [facility] facility, it is important to learn how to keep safe from harming yourself.    Recognize the warning signs:  · Abrupt changes in personality, positive or negative- including  increase in energy   · Giving away possessions  · Change in eating patterns- significant weight changes-  positive or negative  · Change in sleeping patterns- unable to sleep or sleeping all the time   · Unwillingness or inability to communicate  · Depression  · Unusual sadness, discouragement and loneliness  · Talk of wanting to die  · Neglect of personal appearance   · Rebelliousness- reckless behavior  · Withdrawal from people/activities they love  · Confusion- inability to concentrate     If you or a loved one observes any of these behaviors or has concerns about self-harm, here's what you can do:  · Talk about it- your feelings and reasons for harming yourself  · Remove any means that you might use to hurt yourself (examples: pills, rope, extension cords, firearm)  · Get professional help from the community (Mental Health, Substance Abuse, psychological counseling)  · Do not be alone:Call your Safe Contact- someone whom you trust who will be there for you.  · Call your local CRISIS HOTLINE 865-6033 or 598-014-1292  · Call your local Children's Mobile Crisis Response Team [place] (674) 641-2170 or wwwHOLLR  · Call the toll free National Suicide Prevention Hotlines   · National Suicide Prevention Lifeline 886-160-DAOT (1159)  · National Hope Line Network 800-SUICIDE (384-1102)    Discharge Instructions    Discharged to {DISCHARGE TO:928230} by {TRANSPORTATION:403767} with {WIITH:558820}. Discharged via {DISCHARGED VIA:324529}, hospital escort: {HOSPITAL ESCORT:925773}.  Special equipment needed: {SPECIAL EQUIPMENT:450331}    Be sure to schedule a follow-up appointment with your primary care doctor or any specialists as instructed.     Discharge Plan:   Smoking Cessation Offered: Patient Counseled  Influenza Vaccine Indication: Not indicated: Previously immunized this influenza season and > 8 years of age ([facility])    I understand that a diet low in cholesterol, fat, and sodium is recommended for  good health. Unless I have been given specific instructions below for another diet, I accept this instruction as my diet prescription.   Other diet: ***    Special Instructions: {BODY SYSTEMS:56001}    · Is patient discharged on Warfarin / Coumadin?   {WARFARIN YES/NO?:907346}    · Is patient Post Blood Transfusion?  {TRANSFUSION YES/NO?:31293}    Depression / Suicide Risk    As you are discharged from this [facility] facility, it is important to learn how to keep safe from harming yourself.    Recognize the warning signs:  · Abrupt changes in personality, positive or negative- including increase in energy   · Giving away possessions  · Change in eating patterns- significant weight changes-  positive or negative  · Change in sleeping patterns- unable to sleep or sleeping all the time   · Unwillingness or inability to communicate  · Depression  · Unusual sadness, discouragement and loneliness  · Talk of wanting to die  · Neglect of personal appearance   · Rebelliousness- reckless behavior  · Withdrawal from people/activities they love  · Confusion- inability to concentrate     If you or a loved one observes any of these behaviors or has concerns about self-harm, here's what you can do:  · Talk about it- your feelings and reasons for harming yourself  · Remove any means that you might use to hurt yourself (examples: pills, rope, extension cords, firearm)  · Get professional help from the community (Mental Health, Substance Abuse, psychological counseling)  · Do not be alone:Call your Safe Contact- someone whom you trust who will be there for you.  · Call your local CRISIS HOTLINE 626-9665 or 746-142-3479  · Call your local Children's Mobile Crisis Response Team [place] (514) 264-3840 or www.[employer]  · Call the toll free National Suicide Prevention Hotlines   · National Suicide Prevention Lifeline 316-142-EPJY (9200)  · National Hope Line Network 800-SUICIDE (557-8979)

## 2017-06-06 NOTE — CARE PLAN
Problem: Post Op Day 4 CABG/Heart Valve Replacement  Goal: Optimal care of the Post Op CABG/Heart Valve replacement Post Op Day 4  Intervention: Daily Weights  80.6kg    Intervention: Shower daily and clean incisions twice daily with soap and water  Completed with day shift       Intervention: Up in chair for all meals  Pt up in chair for breakfast.   Intervention: Ambulate, increasing the distance each time x 3 and before bed  Pt ambulated around unit x2 with standby assist before bed and in the a.m.   Intervention: IS q 1 hour while awake and record best IS volume  Best IS 1750  Intervention: Consider removal of nassar, chest tube and pacer wire if not already done  Previously completed.

## 2017-06-06 NOTE — DISCHARGE SUMMARY
CHIEF COMPLAINT ON ADMISSION  No chief complaint on file.      CODE STATUS  Full Code    HPI & HOSPITAL COURSE  This is a 67 y.o. year old male here with history of COPD, smokes    1-2 packs of cigarettes a day for his adult life, increased shortness of    breath, neck pain, chest pain.  He is able to walk quite a ways without any    chest pain.  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  CTS had seen him 2 years ago.  We thought his ejection fraction was too low    for surgery, but now his ejection fraction seems to be toward 50%, echo was    improved since 2015 reconsideration for bypass surgery by Dr. Jacob.  He has had progression of coronary disease to 90% LAD in 2 areas of circumflex   lesion of 40% and a right coronary artery with 99%.  At that time 2 years ago we did not think he was a surgical candidate because he had low ejection fraction of 21%, but now with a better ejection fraction, he is    a surgical candidate. Dr. Dionisio Jones was consulted and decisions to move forward with CABG.    POD 1 Extubated, HDS, no drips, chest tube output minimal and negative for air leak.  Neuro grossly intact.  Plan: Transition to tele status.  DC chest tubes.  DC nassar.  POD 2 HDS/HTN, NSR/ST, +3.5 L, wts up, 3 L NC.  Renal function stable. Surgical incisions CDI.  PLAN: Diurese. Increase BB, add ACE (cardiomyopathy). IS/AMB. 2 view CXR in am.   POD 3 HDS, NSR/ST, wts at baseline, renal stable, passing gas, no BM yet.  PLAN: DC temp wires, 2 view CXR today, bowel protocol.    POD 4 HDS, SR, CXR- sm left effusion/aterlectasis- enc IS/amb, plan home vs SNF in am  POD 5 HDS, NSR, surgicl incisions CDI.  MIld leukocytosis, afebrile--check UA.  Prior CXR atelectasis, small effusions, not large enough for tap.  Had BM.  OK for transfer to SNF today.   Therefore, he is discharged in fair and stable condition for further post-acute management.     SPECIFIC OUTPATIENT  FOLLOW-UP  F/U with CArdiothoracic Surgery 1 month  CArdiology 1-2 weeks    DISCHARGE PROBLEM LIST  Active Problems:    CAD (coronary artery disease) POA: Unknown  Resolved Problems:    * No resolved hospital problems. *      FOLLOW UP  Future Appointments  Date Time Provider Department Center   6/27/2017 9:00 AM PFT-RM3 [facility] None   6/27/2017 10:20 AM A Rotation [facility] None   11/10/2017 8:40 AM Carmelo Jacob M.D. [facility] None     No follow-up provider specified.    MEDICATIONS ON DISCHARGE   Evans Díaz   Home Medication Instructions BOEWN:[id number]    Printed on:06/06/17 8150   Medication Information                      albuterol 108 (90 BASE) MCG/ACT Aero Soln inhalation aerosol  Inhale 2 Puffs by mouth every four hours as needed.             aspirin (ASA) 81 MG CHEW chewable tablet  Take 1 Tab by mouth every day.             atorvastatin (LIPITOR) 40 MG Tab  Take 1 Tab by mouth every bedtime.             carvedilol (COREG) 6.25 MG Tab  Take 1 Tab by mouth 2 times a day, with meals.             clopidogrel (PLAVIX) 75 MG Tab  Take 1 Tab by mouth every day.             docusate sodium 100 MG Cap  Take 100 mg by mouth every morning.             enalapril (VASOTEC) 5 MG Tab  Take 2 Tabs by mouth every day.             furosemide (LASIX) 10 MG/ML Solution  2 mL by Intravenous route every day.             hydrocodone-acetaminophen (NORCO) 5-325 MG Tab per tablet  Take 1-2 Tabs by mouth every 6 hours as needed.             ipratropium-albuterol (DUONEB) 0.5-2.5 (3) MG/3ML nebulizer solution  3 mL by Nebulization route every four hours as needed for Shortness of Breath.             potassium chloride ER (KLOR-CON) 10 MEQ tablet  Take 1 Tab by mouth every day.             tiotropium (SPIRIVA) 18 MCG Cap  Inhale 1 Cap by mouth every day.                 DIET  Orders Placed This Encounter   Procedures   • DIET ORDER     Standing Status: Standing      Number of Occurrences: 1      Standing Expiration Date:      Order  Specific Question:  Diet:     Answer:  Consistent Carbohydrate [4]     Order Specific Question:  Diet:     Answer:  Cardiac [6]       ACTIVITY  As tolerated and directed by skilled nursing.  No Driving x 1 month   No heavy lifting/pushing/pulling >10# x 3 months    LINES, DRAINS, AND WOUNDS  This is an automated list. Peripheral IVs will be removed prior to discharge.  PIV Group Left Wrist 22g (Active)       PIV Group Right Forearm 18g Flexible Catheter (Active)   Line Secured Taped;Transparent 6/5/2017  8:00 PM   Site Condition / Description Assessed;Patent;Clean;Dry;Intact 6/5/2017  8:00 PM   Dressing Type / Description Transparent;Occlusive;Clean;Dry;Intact 6/5/2017  8:00 PM   Dressing Status Observed 6/5/2017  8:00 PM   Saline Locked Yes 6/5/2017  8:00 PM   Infiltration Grading Used by [facility] 0 6/5/2017  8:00 PM   Phlebitis Scale (Used by [facility]) 0 6/5/2017  8:00 PM   NEXT Site Change Date 06/08/17 6/1/2017  8:00 PM       Surgical Incision  Incision Chest (Active)   Wound Bed Pink;Red 6/5/2017  8:00 PM   Drainage  Minimal 6/5/2017  8:00 PM   Periwound Skin Pink;Normal;Intact 6/5/2017  8:00 PM   Daily - Wound Closure Other (Comments) 6/4/2017  8:00 PM   Dressing Options Dry Gauze 6/5/2017  8:00 PM   Dressing Status / Change Clean;Dry;Intact;Observed 6/5/2017  8:00 PM   Daily - Dressing Change Observed 6/5/2017  8:00 PM   Dressing Change Frequency As Needed 6/5/2017  8:00 PM   Next Dressing Change  06/08/17 6/4/2017  8:00 AM   Nutrition Consult No (Comments) 6/1/2017  8:00 PM   Protocol Orders Initiated N/A 6/1/2017  8:00 PM       Surgical Incision  Incision Right Leg Lower (Active)   Wound Bed Pink 6/5/2017  8:00 PM   Drainage  None 6/5/2017  8:00 PM   Periwound Skin Pink;Normal;Intact 6/5/2017  8:00 PM   Daily - Wound Closure Open to Air 6/5/2017  8:00 PM   Dressing Options Open to Air 6/5/2017  8:00 PM   Dressing Status / Change Clean;Dry;Intact;Observed 6/2/2017  8:00 AM   Daily - Dressing Change  Observed 6/2/2017  8:00 AM   Dressing Change Frequency Daily 6/2/2017  8:00 AM   Next Dressing Change  06/02/17 6/2/2017  8:00 AM   Nutrition Consult No (Comments) 6/1/2017  8:00 PM   Protocol Orders Initiated N/A 6/1/2017  8:00 PM                Prevena dressing to be removed on POD 7 or 8, or when battery dies  Wash incisions soap/water, pat dry, do not use ointments  Report s/s infection including warmth/redness/oozing or fever/chills    MENTAL STATUS ON TRANSFER  Level of Consciousness: Alert  Orientation : Oriented x 4  Speech: Speech Clear    CONSULTATIONS  Pulmonology  Hospitalist  Cardiology      PROCEDURES  6/1 Coronary artery bypass grafting x2, left JIMBO to distal LAD,    reverse saphenous vein graft to distal right coronary artery, endoscopic vein    harvest, transesophageal echocardiography.    LABORATORY  Lab Results   Component Value Date/Time    SODIUM 133* 06/06/2017 03:20 AM    POTASSIUM 3.8 06/06/2017 03:20 AM    CHLORIDE 98 06/06/2017 03:20 AM    CO2 27 06/06/2017 03:20 AM    GLUCOSE 144* 06/06/2017 03:20 AM    BUN 30* 06/06/2017 03:20 AM    CREATININE 1.14 06/06/2017 03:20 AM        Lab Results   Component Value Date/Time    WBC 11.1* 06/06/2017 03:20 AM    HEMOGLOBIN 11.1* 06/06/2017 03:20 AM    HEMATOCRIT 33.3* 06/06/2017 03:20 AM    PLATELET COUNT 292 06/06/2017 03:20 AM      Use of narcotics and risks associated reviewed with patient.  [place]  checked.   Discussed weaning.    Patient verbalizes understanding.  Discussed when to seek emergency medical care  Questions and concerns addressed.  Patient verbalizes understanding and agrees to plan of care    Total time of the discharge process exceeds 40 minutes.

## 2017-06-06 NOTE — DISCHARGE PLANNING
"I received a call from Garfield at [facility] stating she was told by \"Clotilde\" at [facility] that they can't give an authorization for transfer because they haven't been notified of admission. I emailed and calls [facility] and was notified that they stopped notifying of admissions approx. 1 month ago as [facility] has access to records.  I called Garfield back and updated her that Kerri ZURITA is supposed to call me back with an update after she calls.subha Murdock but didn't not get an answer. I called Bari SR to update but did not get an answer. Emailed info to Bari Mohr and Laura  "

## 2017-06-06 NOTE — DISCHARGE PLANNING
Spoke with Ady at [facility], they have accepted patient in transfer.  Patient to transfer today 1300 to [facility] via the [facility] van.  Discharge Summary has been faxed to [facility] at [phone].

## 2017-06-06 NOTE — CARE PLAN
Problem: Post Op Day 4 CABG/Heart Valve Replacement  Goal: Optimal care of the Post Op CABG/Heart Valve replacement Post Op Day 4  Outcome: PROGRESSING AS EXPECTED  Intervention: Daily Weights  Done      Intervention: Shower daily and clean incisions twice daily with soap and water  Cleaned incision with soap and water.  Intervention: Up in chair for all meals  Done      Intervention: Ambulate, increasing the distance each time x 3 and before bed  Done  Intervention: IS q 1 hour while awake and record best IS volume  1850 best IS this morning.   Intervention: Consider removal of nassar, chest tube and pacer wire if not already done  Done      Intervention: Discharge Education  Done and will be reviewed.

## 2017-06-06 NOTE — PROGRESS NOTES
Report given to RN at [facility]. No questions or concerns. Patient IV were Discontinued. Discharge information dicussed. Patient had no questions and eager to transfer. [facility]  arrived and escorted patient with oxygen by wheelchair. Belongings in black bag and blue plastic bag. Telephone and watch also placed in zippered coat pocket for safe keeping.

## 2017-06-06 NOTE — PROGRESS NOTES
Assumed care of patient at 0700. Received bedside report from FLORENCE Sherwood. Patient in chairwith no needs, questions concerns or pain at this time. Call light within reach and personal items within reach. Will continue to closely monitor. See flowsheets for vitals. Rates verified.

## 2017-06-06 NOTE — DISCHARGE PLANNING
Received call from Anna at [facility], states feliciano Mabry at [facility]

## 2017-06-06 NOTE — PROGRESS NOTES
Cardiovascular Surgery Progress Note    Name: Evans Díaz  MRN: [id number]  : 1950  Admit Date: 2017  5:04 AM  Procedure:  Procedure(s) and Anesthesia Type:     * MULTIPLE CORONARY ARTERY BYPASS x2 ENDO VEIN HARVEST AND PREVENA DRESSING - General     * HELLEN - INTRA-OPERATIVE - General  5 Day Post-Op    Vitals:  Patient Vitals for the past 8 hrs:   Temp SpO2 O2 Delivery O2 (LPM) Pulse Heart Rate (Monitored) Resp NIBP   17 0600 - - - - 98 97 20 141/82 mmHg   17 0500 - - - - - 97  -   17 0400 37.2 °C (99 °F) 96 % Silicone Nasal Cannula 2 - 91 (!) 10 -   17 0300 - - - - - (!) 102 (!)  -   17 0200 - - - - - 90 (!) 11 -   17 0100 - - - - - 90 15 -   17 0000 37.3 °C (99.1 °F) 96 % Silicone Nasal Cannula 2 - 96 (!) 22 115/65 mmHg     Temp (24hrs), Av.1 °C (98.8 °F), Min:36.8 °C (98.2 °F), Max:37.3 °C (99.1 °F)      Respiratory:    Respiration: 20, Pulse Oximetry: 96 %, O2 Daily Delivery Respiratory : Silicone Nasal Cannula     Chest Tube Drains:          Fluids:    Intake/Output Summary (Last 24 hours) at 17 0729  Last data filed at 17 0200   Gross per 24 hour   Intake   1020 ml   Output      0 ml   Net   1020 ml     Admit weight: Weight: 84.5 kg (186 lb 4.6 oz)  Current weight: Weight: 83.1 kg (183 lb 3.2 oz) (17 0600)    Labs:  Recent Labs      17   0530  17   0520  17   0320   WBC  14.8*  9.7  11.1*   RBC  3.61*  3.43*  3.71*   HEMOGLOBIN  10.8*  10.1*  11.1*   HEMATOCRIT  32.0*  30.8*  33.3*   MCV  88.6  89.8  89.8   MCH  29.9  29.4  29.9   MCHC  33.8  32.8*  33.3*   RDW  43.8  44.2  42.7   PLATELETCT  216  239  292   MPV  10.2  9.8  9.7         Recent Labs      17   0530  17   0520  17   0320   SODIUM  131*  131*  133*   POTASSIUM  3.6  3.8  3.8   CHLORIDE  96  98  98   CO2  28  27  27   GLUCOSE  100*  134*  144*   BUN  35*  34*  30*   CREATININE  1.30  1.20  1.14   CALCIUM  8.7  8.8  8.7            Medications:  • tiotropium  1 Cap     • furosemide  20 mg     • potassium chloride (KCl)  10 mEq     • carvedilol  6.25 mg     • enalapril  5 mg     • aspirin  81 mg     • docusate sodium  100 mg      And   • senna-docusate  1 Tab     • enoxaparin  40 mg     • atorvastatin  40 mg     • clopidogrel  75 mg         Exam:   Review of Systems   Constitutional: Negative.    HENT: Negative.    Eyes: Negative.    Respiratory: Negative.    Cardiovascular: Negative.    Gastrointestinal: Negative.    Genitourinary: Negative.    Musculoskeletal: Negative.    Skin: Negative.    Neurological: Negative.    Psychiatric/Behavioral: Negative.        Physical Exam   Constitutional: He is oriented to person, place, and time. He appears well-developed and well-nourished.   HENT:   Head: Normocephalic and atraumatic.   Eyes: Pupils are equal, round, and reactive to light.   Neck: Normal range of motion. Neck supple.   Cardiovascular: Normal rate and regular rhythm.    Pulmonary/Chest: Effort normal.   Decreased at bases.    Abdominal: Soft. Bowel sounds are normal.   Musculoskeletal: Normal range of motion.   Neurological: He is alert and oriented to person, place, and time.   Skin: Skin is warm and dry.   Wounds CDI   Psychiatric: He has a normal mood and affect.       Quality Measures:   EKG reviewed, Medications reviewed, Radiology images reviewed and Labs reviewed  Nassar catheter: No Nassar  Central line in place: Need for access    DVT Prophylaxis: Enoxaparin (Lovenox)  DVT prophylaxis - mechanical: SCDs  Ulcer prophylaxis: Yes    Assessed for rehab: Patient returned to prior level of function, rehabilitation not indicated at this time      Assessment/Plan:  Extubated, HDS, no drips, chest tube output minimal and negative for air leak.  Neuro grossly intact.  Plan: Transition to tele status.  DC chest tubes.  DC nassar.  POD 2 HDS/HTN, NSR/ST, +3.5 L, wts up, 3 L NC.  Renal function stable. Surgical incisions CDI.  PLAN: Diurese.  Increase BB, add ACE (cardiomyopathy). IS/AMB. 2 view CXR in am.   POD 3 HDS, NSR/ST, wts at baseline, renal stable, passing gas, no BM yet.  PLAN: DC temp wires, 2 view CXR today, bowel protocol.   POD 4 HDS, SR, CXR- sm left effusion/aterlectasis- enc IS/amb, plan home vs SNF in am  POD 5 HDS, NSR, surgicl incisions CDI.  MIld leukocytosis, afebrile--check UA.  Prior CXR atelectasis, small effusions, not large enough for tap.  Had BM.  OK for transfer to SNF today.     Patient seen, examined and plan reviewed with midlevel provider. I agree with the plan.      Active Hospital Problems    Diagnosis   • CAD (coronary artery disease) [I25.10]

## 2017-06-09 NOTE — DOCUMENTATION QUERY
PCS DOCUMENTATION QUERY    Please review the following information and exercise your independent professional judgment in responding to this query.     OPERATION:  Coronary artery bypass grafting x2, left JIMBO to distal LAD,    reverse saphenous vein graft to distal right coronary artery, endoscopic vein    harvest, transesophageal echocardiography     The following documentation components are required in each operative report for accurate coding and reporting purposes: Section, Body System, Root Operation, Body Part, Approach, and Device along with any other pertinent information to support specificity of the procedure. (Definitions of each component can be viewed at the bottom of this query form.)    Coding is unable to be determined with the current documentation for component Body Part. Based upon the clinical findings, risk factors, and treatment can this procedure be further specified?      · Greater saphenous vein, right    · Lesser saphenous vein, right        The medical record reflects the following:    Clinical Findings  Missy Vital harvested the vein from the right leg and thigh    Reverse saphenous vein graft placed to the right coronary at the crux using    6-0 Prolene technique.    Treatment     Risk Factors    Location  Operative Note     Required Operative Note Documentation Components:   · Section: The type of procedure  · Body System: General body system  · Root Operation: Objective of the procedure  · Body Part: Body part the procedure is being performed on  · Approach: Technique used to reach the site of the procedure  · Device: Device that remains after the procedure is complete  · Qualifier: Additional information about the procedure    Thank you,   Analia Padron

## 2017-06-23 ENCOUNTER — OFFICE VISIT (OUTPATIENT)
Dept: CARDIOLOGY | Facility: MEDICAL CENTER | Age: 67
End: 2017-06-23
Payer: COMMERCIAL

## 2017-06-23 ENCOUNTER — HOSPITAL ENCOUNTER (OUTPATIENT)
Dept: RADIOLOGY | Facility: MEDICAL CENTER | Age: 67
End: 2017-06-23
Attending: INTERNAL MEDICINE
Payer: COMMERCIAL

## 2017-06-23 VITALS
OXYGEN SATURATION: 96 % | HEART RATE: 80 BPM | WEIGHT: 187 LBS | BODY MASS INDEX: 27.7 KG/M2 | DIASTOLIC BLOOD PRESSURE: 62 MMHG | HEIGHT: 69 IN | SYSTOLIC BLOOD PRESSURE: 106 MMHG

## 2017-06-23 DIAGNOSIS — Z95.1 S/P CABG X 2: ICD-10-CM

## 2017-06-23 DIAGNOSIS — J90 PLEURAL EFFUSION, LEFT: ICD-10-CM

## 2017-06-23 DIAGNOSIS — I42.0 DILATED CARDIOMYOPATHY (HCC): ICD-10-CM

## 2017-06-23 DIAGNOSIS — I25.10 CORONARY ARTERY DISEASE INVOLVING NATIVE CORONARY ARTERY OF NATIVE HEART WITHOUT ANGINA PECTORIS: ICD-10-CM

## 2017-06-23 PROCEDURE — 71020 DX-CHEST-2 VIEWS: CPT

## 2017-06-23 PROCEDURE — 99214 OFFICE O/P EST MOD 30 MIN: CPT | Performed by: INTERNAL MEDICINE

## 2017-06-23 ASSESSMENT — ENCOUNTER SYMPTOMS
PALPITATIONS: 0
BRUISES/BLEEDS EASILY: 0
PND: 0
HEADACHES: 0
MYALGIAS: 0
FEVER: 0
DEPRESSION: 0
CHILLS: 0
HEARTBURN: 0
COUGH: 1
NAUSEA: 0
NERVOUS/ANXIOUS: 0
BLURRED VISION: 0
SHORTNESS OF BREATH: 1
DIZZINESS: 0
EYE DISCHARGE: 0

## 2017-06-23 NOTE — MR AVS SNAPSHOT
"        Evans Díaz   2017 10:15 AM   Office Visit   MRN: [id number]    Department:  Heart [facility] [facility]   Dept Phone:  [phone]    Description:  Male : 1950   Provider:  Carmelo Hernández M.D.           Reason for Visit     Hospital Follow-up           Allergies as of 2017     No Known Allergies      You were diagnosed with     Coronary artery disease involving native coronary artery of native heart without angina pectoris   [9485187]       Dilated cardiomyopathy (CMS-HCC)   [731785]       S/P CABG x 2   [744139]       Pleural effusion, left   [464741]         Vital Signs     Blood Pressure Pulse Height Weight Body Mass Index Oxygen Saturation    106/62 mmHg 80 1.753 m (5' 9\") 84.823 kg (187 lb) 27.60 kg/m2 96%    Smoking Status                   Current Some Day Smoker           Basic Information     Date Of Birth Sex Race Ethnicity Preferred Language    1950 Male White Non- English      Your appointments     2017  9:00 AM   Pulmonary Function Test with PFT-RM3   [facility] (--)    [address]  [address]  [place] [address]   [phone]            2017 10:20 AM   New Patient Pulmonary with A Rotation   [facility] (--)    [address]  [address]  [place] [address]   [phone]            Nov 10, 2017  8:40 AM   FOLLOW UP with Carmelo Jacob M.D.   [facility] (--)    [address]  [place] [address] [phone]              Problem List              ICD-10-CM Priority Class Noted - Resolved    COPD exacerbation (CMS-HCC) J44.1 Medium  2015 - Present    Dilated cardiomyopathy (CMS-HCC) I42.0 High  2015 - Present    Systolic CHF with reduced left ventricular function, NYHA class 2 (CMS-HCC) I50.20   2015 - Present    CAD (coronary artery disease) I25.10   2015 - Present    CKD (chronic kidney disease) stage 3, GFR 30-59 ml/min " N18.3   10/1/2015 - Present    Cigarette smoker F17.210   10/6/2016 - Present      Health Maintenance        Date Due Completion Dates    IMM DTaP/Tdap/Td Vaccine (1 - Tdap) 2/4/1967 ---    COLONOSCOPY 2/4/1998 ---    IMM ZOSTER VACCINE 2/4/2008 ---    IMM PNEUMOCOCCAL 65+ (ADULT) LOW/MEDIUM RISK SERIES (2 of 2 - PCV13) 2/27/2016 2/27/2015            Current Immunizations     Influenza TIV (IM) 12/1/2014    Pneumococcal polysaccharide vaccine (PPSV-23) 2/27/2015 10:08 PM      Below and/or attached are the medications your provider expects you to take. Review all of your home medications and newly ordered medications with your provider and/or pharmacist. Follow medication instructions as directed by your provider and/or pharmacist. Please keep your medication list with you and share with your provider. Update the information when medications are discontinued, doses are changed, or new medications (including over-the-counter products) are added; and carry medication information at all times in the event of emergency situations     Allergies:  No Known Allergies          Medications  Valid as of: June 23, 2017 - 11:12 AM    Generic Name Brand Name Tablet Size Instructions for use    Albuterol Sulfate (Aero Soln) albuterol 108 (90 BASE) MCG/ACT Inhale 2 Puffs by mouth every four hours as needed.        Aspirin (Chew Tab) ASA 81 MG Take 1 Tab by mouth every day.        Atorvastatin Calcium (Tab) LIPITOR 40 MG Take 1 Tab by mouth every bedtime.        Carvedilol (Tab) COREG 6.25 MG Take 1 Tab by mouth 2 times a day, with meals.        Clopidogrel Bisulfate (Tab) PLAVIX 75 MG Take 1 Tab by mouth every day.        Docusate Sodium (Cap)  MG Take 100 mg by mouth every morning.        Enalapril Maleate (Tab) VASOTEC 5 MG Take 2 Tabs by mouth every day.        Furosemide (Solution) LASIX 10 MG/ML 2 mL by Intravenous route every day.        Hydrocodone-Acetaminophen (Tab) NORCO 5-325 MG Take 1-2 Tabs by mouth every 6  hours as needed.        Ipratropium-Albuterol (Solution) DUONEB 0.5-2.5 (3) MG/3ML 3 mL by Nebulization route every four hours as needed for Shortness of Breath.        Potassium Chloride (Tab CR) KLOR-CON 10 MEQ Take 1 Tab by mouth every day.        Tiotropium Bromide Monohydrate (Cap) SPIRIVA 18 MCG Inhale 1 Cap by mouth every day.        .                 Medicines prescribed today were sent to:     [facility] #[id number] - [place], [place] - [address]    [address] [place] [address] [place] [address]    Phone: [phone] Fax: [phone]    Open 24 Hours?: No      Medication refill instructions:       If your prescription bottle indicates you have medication refills left, it is not necessary to call your provider’s office. Please contact your pharmacy and they will refill your medication.    If your prescription bottle indicates you do not have any refills left, you may request refills at any time through one of the following ways: The online [employer] system (except Urgent Care), by calling your provider’s office, or by asking your pharmacy to contact your provider’s office with a refill request. Medication refills are processed only during regular business hours and may not be available until the next business day. Your provider may request additional information or to have a follow-up visit with you prior to refilling your medication.   *Please Note: Medication refills are assigned a new Rx number when refilled electronically. Your pharmacy may indicate that no refills were authorized even though a new prescription for the same medication is available at the pharmacy. Please request the medicine by name with the pharmacy before contacting your provider for a refill.        Your To Do List     Future Labs/Procedures Complete By Expires    DX-CHEST-2 VIEWS  As directed 6/23/2018         [employer] Access Code: Activation code not generated  Current [employer] Status: Active          Quit Tobacco Information       Do you want to quit using tobacco?    Quitting tobacco decreases risks of cancer, heart and lung disease, increases life expectancy, improves sense of taste and smell, and increases spending money, among other benefits.    If you are thinking about quitting, we can help.  • [facility] Quit Tobacco Program: [phone]  o Program occurs weekly for four weeks and includes pharmacist consultation on products to support quitting smoking or chewing tobacco. A provider referral is needed for pharmacist consultation.  • Tobacco Users Help Hotline: 2-715-QUIT-NOW (481-9803) or https://[place].quitlogix.org/  o Free, confidential telephone and online coaching for [place] residents. Sessions are designed on a schedule that is convenient for you. Eligible clients receive free nicotine replacement therapy.  • Nationally: www.smokefree.gov  o Information and professional assistance to support both immediate and long-term needs as you become, and remain, a non-smoker. Smokefree.gov allows you to choose the help that best fits your needs.

## 2017-06-23 NOTE — PROGRESS NOTES
Subjective:   Evans Díaz is a 67 y.o. male who presents today in follow-up. He has a prior history of severe ischemic cardiomyopathy with improvement in left ventricular function.  On June 1 he underwent 2 vessel CABG with LIMA to LAD and vein graft to RCA  He's been a care facility since then and is doing very well except for the observation that his breathing is not as good as it was preoperatively and he has the usual residual cough.  He did not have angina preoperatively.  He is staying off tobacco.    In general he is improving very well from what appears to be an uncomplicated CABG a little over 3 weeks ago except for the possibility of a left pleural effusion    Past Medical History   Diagnosis Date   • Bronchitis    • Pulmonary hypertension (CMS-HCC)    • Chronic airway obstruction, not elsewhere classified    • Hypertension    • Congestive heart failure (CMS-HCC)    • Emphysema of lung (CMS-HCC)    • Coughing blood    • Breath shortness    • Renal disorder    • Cataract      surgery bilat     Past Surgical History   Procedure Laterality Date   • Cardiac cath  3/1/15     CAD but not CABG candidate.  99% RCA.   • Other       cataract surgery IOL bilat   • Multiple coronary artery bypass endo vein harvest  6/1/2017     Procedure: MULTIPLE CORONARY ARTERY BYPASS x2 ENDO VEIN HARVEST AND PREVENA DRESSING;  Surgeon: Dionisio Jones M.D.;  Location: SURGERY [facility];  Service:    • Hellen  6/1/2017     Procedure: HELLEN - INTRA-OPERATIVE;  Surgeon: Dionisio Jones M.D.;  Location: SURGERY [facility];  Service:      Family History   Problem Relation Age of Onset   • Heart Disease Brother 44     valve replacement     History   Smoking status   • Current Some Day Smoker -- 1.00 packs/day for 60 years   • Types: Cigarettes   Smokeless tobacco   • Never Used     No Known Allergies  Outpatient Encounter Prescriptions as of 6/23/2017   Medication Sig Dispense Refill   • atorvastatin (LIPITOR) 40 MG  "Tab Take 1 Tab by mouth every bedtime. 30 Tab    • enalapril (VASOTEC) 5 MG Tab Take 2 Tabs by mouth every day. 30 Tab    • tiotropium (SPIRIVA) 18 MCG Cap Inhale 1 Cap by mouth every day. 30 Cap 3   • carvedilol (COREG) 6.25 MG Tab Take 1 Tab by mouth 2 times a day, with meals. 60 Tab 11   • albuterol 108 (90 BASE) MCG/ACT Aero Soln inhalation aerosol Inhale 2 Puffs by mouth every four hours as needed. 8.5 g    • clopidogrel (PLAVIX) 75 MG Tab Take 1 Tab by mouth every day. 30 Tab    • docusate sodium 100 MG Cap Take 100 mg by mouth every morning. 60 Cap    • furosemide (LASIX) 10 MG/ML Solution 2 mL by Intravenous route every day.  0   • hydrocodone-acetaminophen (NORCO) 5-325 MG Tab per tablet Take 1-2 Tabs by mouth every 6 hours as needed. 20 Tab 0   • ipratropium-albuterol (DUONEB) 0.5-2.5 (3) MG/3ML nebulizer solution 3 mL by Nebulization route every four hours as needed for Shortness of Breath.     • potassium chloride ER (KLOR-CON) 10 MEQ tablet Take 1 Tab by mouth every day. 60 Tab 3   • aspirin (ASA) 81 MG CHEW chewable tablet Take 1 Tab by mouth every day. 100 Tab 11     No facility-administered encounter medications on file as of 6/23/2017.     Review of Systems   Constitutional: Negative for fever, chills and malaise/fatigue.   Eyes: Negative for blurred vision and discharge.   Respiratory: Positive for cough and shortness of breath.    Cardiovascular: Positive for chest pain. Negative for palpitations, leg swelling and PND.   Gastrointestinal: Negative for heartburn and nausea.   Genitourinary: Negative for dysuria and urgency.   Musculoskeletal: Negative for myalgias.   Skin: Negative for itching and rash.   Neurological: Negative for dizziness and headaches.   Endo/Heme/Allergies: Negative for environmental allergies. Does not bruise/bleed easily.   Psychiatric/Behavioral: Negative for depression. The patient is not nervous/anxious.         Objective:   /62 mmHg  Pulse 80  Ht 1.753 m (5' 9\") "  Wt 84.823 kg (187 lb)  BMI 27.60 kg/m2  SpO2 96%    Physical Exam   Constitutional: He is oriented to person, place, and time. He appears well-developed and well-nourished.   HENT:   Head: Normocephalic and atraumatic.   Eyes: Conjunctivae and EOM are normal. No scleral icterus.   Neck: Neck supple. No JVD present. No thyromegaly present.   Cardiovascular: Normal rate, regular rhythm and normal heart sounds.  Exam reveals no gallop and no friction rub.    No murmur heard.  Pulmonary/Chest: Effort normal and breath sounds normal. No respiratory distress. He has no wheezes. He has no rales. He exhibits no tenderness.   Well-healed sternotomy scar  Dullness to percussion and reduced breath sounds in the lower 25% of the left base   Abdominal: Soft. Bowel sounds are normal. He exhibits no distension and no mass. There is no tenderness.   Neurological: He is alert and oriented to person, place, and time. Coordination normal.   Skin: Skin is warm and dry. No rash noted. No pallor.   Psychiatric: He has a normal mood and affect. His behavior is normal. Judgment and thought content normal.       Assessment:     1. Coronary artery disease involving native coronary artery of native heart without angina pectoris     2. Dilated cardiomyopathy (CMS-HCC)     3. S/P CABG x 2     4. Pleural effusion, left  DX-CHEST-2 VIEWS       Medical Decision Making:  Today's Assessment / Status / Plan:   The fact that his breathing is a little worse postoperatively is noted.  Physical findings suggest moderate left pleural effusion  Chest x-ray ordered  He may or may not need a left thoracentesis  I think it is safe for him to leave the care center and be discharged to his home today

## 2017-06-27 ENCOUNTER — APPOINTMENT (OUTPATIENT)
Dept: PULMONOLOGY | Facility: HOSPICE | Age: 67
End: 2017-06-27
Payer: MEDICARE

## 2017-07-21 ENCOUNTER — TELEPHONE (OUTPATIENT)
Dept: CARDIOLOGY | Facility: MEDICAL CENTER | Age: 67
End: 2017-07-21

## 2017-07-21 NOTE — TELEPHONE ENCOUNTER
Information received from [facility]. Signed by Dr. Jacob and faxed back to # [phone]. Packet placed in scan

## 2017-08-02 ENCOUNTER — OFFICE VISIT (OUTPATIENT)
Dept: MEDICAL GROUP | Facility: MEDICAL CENTER | Age: 67
End: 2017-08-02
Payer: COMMERCIAL

## 2017-08-02 VITALS
DIASTOLIC BLOOD PRESSURE: 70 MMHG | BODY MASS INDEX: 25.73 KG/M2 | RESPIRATION RATE: 14 BRPM | WEIGHT: 173.72 LBS | OXYGEN SATURATION: 97 % | SYSTOLIC BLOOD PRESSURE: 124 MMHG | HEIGHT: 69 IN | TEMPERATURE: 98.6 F | HEART RATE: 85 BPM

## 2017-08-02 DIAGNOSIS — Z95.1 S/P CABG X 2: ICD-10-CM

## 2017-08-02 DIAGNOSIS — E78.5 HYPERLIPIDEMIA, UNSPECIFIED HYPERLIPIDEMIA TYPE: ICD-10-CM

## 2017-08-02 DIAGNOSIS — I42.0 DILATED CARDIOMYOPATHY (HCC): ICD-10-CM

## 2017-08-02 DIAGNOSIS — I10 ESSENTIAL HYPERTENSION: ICD-10-CM

## 2017-08-02 DIAGNOSIS — F17.210 CIGARETTE SMOKER: ICD-10-CM

## 2017-08-02 DIAGNOSIS — J44.9 CHRONIC OBSTRUCTIVE PULMONARY DISEASE, UNSPECIFIED COPD TYPE (HCC): ICD-10-CM

## 2017-08-02 DIAGNOSIS — Z00.00 HEALTH CARE MAINTENANCE: ICD-10-CM

## 2017-08-02 PROCEDURE — 90670 PCV13 VACCINE IM: CPT | Performed by: FAMILY MEDICINE

## 2017-08-02 PROCEDURE — 90471 IMMUNIZATION ADMIN: CPT | Performed by: FAMILY MEDICINE

## 2017-08-02 PROCEDURE — 99214 OFFICE O/P EST MOD 30 MIN: CPT | Mod: 25 | Performed by: FAMILY MEDICINE

## 2017-08-02 RX ORDER — FUROSEMIDE 20 MG/1
20 TABLET ORAL DAILY
COMMUNITY
End: 2017-08-03

## 2017-08-02 ASSESSMENT — PATIENT HEALTH QUESTIONNAIRE - PHQ9: CLINICAL INTERPRETATION OF PHQ2 SCORE: 0

## 2017-08-02 NOTE — PROGRESS NOTES
CC: Establish a new PCP.    HPI:  Evans presents today to establish a new PCP.    Patient has been active and independent, still works. Has the following medical issues:    Coronary artery disease, S/P CABG x 2 in 6/1/2017.last echo in 10/2016  Showed EF 55%, RVSP 25 mmHg.He has been stable, and asymptomatic.Has been on statin, BB, and plavix.Continue follow up with cardiology Dr Jacob, has appt tomorrow.    Chronic obstructive pulmonary disease, he has been stable, and asymptomatic most of the time.Has been on Breo Ellipta inhaler daily, and albuterol as needed.He was scheduled for PFT before the CABG but was not done.    Essential hypertension, BP has been adequately controlled on current medication. Denies headache, chest pain, and SOB.he has been on Enalapril 5 mg daily.No side effects.    Hyperlipidemia, he has been tolerating the statin. Denies muscle pain LFTs has been normal, he has been on lipitor 40 mg daily.    Smokes about 10-15 cig a day, smoking cessation discussed with the patient he stated that he intend to start cutting down gradually.    Due for PCV 13.      Patient Active Problem List    Diagnosis Date Noted   • Dilated cardiomyopathy (CMS-HCC) 02/28/2015     Priority: High   • COPD exacerbation (CMS-HCC) 02/27/2015     Priority: Medium   • Cigarette smoker 10/06/2016   • CKD (chronic kidney disease) stage 3, GFR 30-59 ml/min 10/01/2015   • CAD (coronary artery disease) 06/26/2015   • Systolic CHF with reduced left ventricular function, NYHA class 2 (CMS-HCC) 02/28/2015       Current Outpatient Prescriptions   Medication Sig Dispense Refill   • furosemide (LASIX) 20 MG Tab Take 20 mg by mouth every day.     • Fluticasone Furoate-Vilanterol (BREO ELLIPTA) 100-25 MCG/INH AEROSOL POWDER, BREATH ACTIVATED Inhale 1 Puff by mouth every bedtime.     • albuterol 108 (90 BASE) MCG/ACT Aero Soln inhalation aerosol Inhale 2 Puffs by mouth every four hours as needed. 8.5 g    • atorvastatin (LIPITOR) 40 MG  Tab Take 1 Tab by mouth every bedtime. 30 Tab    • clopidogrel (PLAVIX) 75 MG Tab Take 1 Tab by mouth every day. 30 Tab    • enalapril (VASOTEC) 5 MG Tab Take 2 Tabs by mouth every day. 30 Tab    • potassium chloride ER (KLOR-CON) 10 MEQ tablet Take 1 Tab by mouth every day. 60 Tab 3   • carvedilol (COREG) 6.25 MG Tab Take 1 Tab by mouth 2 times a day, with meals. 60 Tab 11   • aspirin (ASA) 81 MG CHEW chewable tablet Take 1 Tab by mouth every day. 100 Tab 11   • docusate sodium 100 MG Cap Take 100 mg by mouth every morning. 60 Cap    • furosemide (LASIX) 10 MG/ML Solution 2 mL by Intravenous route every day.  0   • hydrocodone-acetaminophen (NORCO) 5-325 MG Tab per tablet Take 1-2 Tabs by mouth every 6 hours as needed. 20 Tab 0   • ipratropium-albuterol (DUONEB) 0.5-2.5 (3) MG/3ML nebulizer solution 3 mL by Nebulization route every four hours as needed for Shortness of Breath.     • tiotropium (SPIRIVA) 18 MCG Cap Inhale 1 Cap by mouth every day. 30 Cap 3     No current facility-administered medications for this visit.         Allergies as of 08/02/2017   • (No Known Allergies)        Social History     Social History   • Marital Status:      Spouse Name: N/A   • Number of Children: N/A   • Years of Education: N/A     Occupational History   • Not on file.     Social History Main Topics   • Smoking status: Current Some Day Smoker -- 0.50 packs/day for 60 years     Types: Cigarettes   • Smokeless tobacco: Never Used   • Alcohol Use: No   • Drug Use: No   • Sexual Activity: Not on file     Other Topics Concern   • Not on file     Social History Narrative       Family History   Problem Relation Age of Onset   • Heart Disease Brother 44     valve replacement       Past Surgical History   Procedure Laterality Date   • Cardiac cath  3/1/15     CAD but not CABG candidate.  99% RCA.   • Other       cataract surgery IOL bilat   • Multiple coronary artery bypass endo vein harvest  6/1/2017     Procedure:  "MULTIPLE CORONARY ARTERY BYPASS x2 ENDO VEIN HARVEST AND PREVENA DRESSING;  Surgeon: Dionisio Jones M.D.;  Location: SURGERY [facility];  Service:    • Elie  6/1/2017     Procedure: ELIE - INTRA-OPERATIVE;  Surgeon: Dionisio Jones M.D.;  Location: SURGERY [facility];  Service:        ROS:  Denies any Headache, Blurred Vision, Confusion Chest pain,  Shortness of breath,  Abdominal pain, Changes of bowel or bladder, Lower ext edema, Fevers, Nights sweats, Weight Changes, Focal weakness or numbness.  All other systems are negative.    /70 mmHg  Pulse 85  Temp(Src) 37 °C (98.6 °F)  Resp 14  Ht 1.753 m (5' 9\")  Wt 78.8 kg (173 lb 11.6 oz)  BMI 25.64 kg/m2  SpO2 97%    Physical Exam:  Gen:         Alert and oriented, No apparent distress.  HEENT:   Perrla, TM clear,  Oralpharynx no erythema or exudates.  Neck:       No Jugular venous distension, Lymphadenopathy, Thyromegaly, Bruits.  Lungs/ Chest:     Clear to auscultation bilaterally. Sternotomy incision has healed.  CV:          Regular rate and rhythm. No murmurs, rubs or gallops.  Abd:         Soft non tender, non distended. Normal active bowel sounds. No hepatosplenomegaly, No pulsatile masses.  Ext:          No clubbing, cyanosis, edema.      Assessment and Plan.   67 y.o. male     1. Dilated cardiomyopathy (CMS-HCC)  Stable( this diagnosis is in the list of the problem but it is unlikely),However last echo in 10/2016 showed a normnal EF ( EF 55%, and a normal RVSP  Continue follow up with cardiologist,( please remove from the list if is not right).    2. Coronary artery disease, S/P CABG x 2 in 6/1/2017.  Has been stable, and asymptomatic.  Continue on statin, BB, and plavix.    3. CKD (chronic kidney disease) stage 3, GFR 30-59 ml/min  Resolved.( last BMP, and eGFR reviewed)    4. Chronic obstructive pulmonary disease, unspecified COPD type (CMS-HCC)  Chronic.Stable, asymptomatic.  Has been on Breo Ellipta inhaler daily, and albuterol as " needed.  Will refer for PFT ( scheduled for one before the CABG but was not done.    - PULMONARY FUNCTION TESTS Test requested: Complete Pulmonary Function Test    5. Essential hypertension  Has been adequately controlled on current medication. Denies headache, chest pain, and SOB.  Continue on Enalapril 5 mg daily.    6. Hyperlipidemia, unspecified hyperlipidemia type  He has been tolerating the statin. Denies muscle pain LFTs has been normal  Continue on lipitor 40 mg daily.    7. Health care maintenance  Due for PCV 13.    - Prevnar 13 PCV-13

## 2017-08-03 ENCOUNTER — OFFICE VISIT (OUTPATIENT)
Dept: CARDIOLOGY | Facility: MEDICAL CENTER | Age: 67
End: 2017-08-03
Payer: COMMERCIAL

## 2017-08-03 VITALS
SYSTOLIC BLOOD PRESSURE: 126 MMHG | DIASTOLIC BLOOD PRESSURE: 72 MMHG | HEIGHT: 69 IN | WEIGHT: 174 LBS | BODY MASS INDEX: 25.77 KG/M2 | HEART RATE: 82 BPM

## 2017-08-03 DIAGNOSIS — I25.10 CORONARY ARTERY DISEASE INVOLVING NATIVE CORONARY ARTERY OF NATIVE HEART WITHOUT ANGINA PECTORIS: ICD-10-CM

## 2017-08-03 DIAGNOSIS — Z95.1 S/P CABG X 2: ICD-10-CM

## 2017-08-03 DIAGNOSIS — I10 ESSENTIAL HYPERTENSION: ICD-10-CM

## 2017-08-03 DIAGNOSIS — I42.0 DILATED CARDIOMYOPATHY (HCC): ICD-10-CM

## 2017-08-03 PROCEDURE — 99214 OFFICE O/P EST MOD 30 MIN: CPT | Performed by: INTERNAL MEDICINE

## 2017-08-03 ASSESSMENT — ENCOUNTER SYMPTOMS
NAUSEA: 0
DIZZINESS: 0
VOMITING: 0
WEAKNESS: 0
BLURRED VISION: 0
CONSTITUTIONAL NEGATIVE: 1
COUGH: 1
PALPITATIONS: 0
SHORTNESS OF BREATH: 1

## 2017-08-03 NOTE — MR AVS SNAPSHOT
"        Evans Díaz   8/3/2017 10:20 AM   Office Visit   MRN: [id number]    Department:  [facility]   Dept Phone:  [phone]    Description:  Male : 1950   Provider:  Carmelo Jacob M.D.           Reason for Visit     Follow-Up           Allergies as of 8/3/2017     No Known Allergies      You were diagnosed with     Dilated cardiomyopathy (CMS-HCC)   [659449]       Essential hypertension   [1977484]       Coronary artery disease involving native coronary artery of native heart without angina pectoris   [6662001]       S/P CABG x 2   [251711]         Vital Signs     Blood Pressure Pulse Height Weight Body Mass Index Smoking Status    126/72 mmHg 82 1.753 m (5' 9.02\") 78.926 kg (174 lb) 25.68 kg/m2 Current Some Day Smoker      Basic Information     Date Of Birth Sex Race Ethnicity Preferred Language    1950 Male White Non- English      Your appointments     2017 11:40 AM   Established Patient with Alejandra Hernandez M.D.   [facility] [address]    [address] [facility] [address]  [facility] [id number] [phone]           You will be receiving a confirmation call a few days before your appointment from our automated call confirmation system.            2017  9:15 AM   ECHO with ECHO [facility] EXAM 9   ECHOCARDIOLOGY [facility] ([facility])    [address] [facility] [address]   [phone]            2017 10:20 AM   FOLLOW UP with Carmelo Jacob M.D.   [facility] (--)    [address]  [place] [id number] [phone]              Problem List              ICD-10-CM Priority Class Noted - Resolved    COPD exacerbation (CMS-HCC) J44.1 Medium  2015 - Present    Dilated cardiomyopathy (CMS-HCC) I42.0 High  2015 - Present    Systolic CHF with reduced left ventricular function, NYHA class 2 (CMS-HCC) I50.20   2015 - Present    CAD (coronary artery disease) I25.10   2015 - " Present    Cigarette smoker F17.210   10/6/2016 - Present    Chronic obstructive pulmonary disease (CMS-HCC) J44.9   8/2/2017 - Present    Essential hypertension I10   8/2/2017 - Present    Hyperlipidemia E78.5   8/2/2017 - Present    S/P CABG x 2 Z95.1   8/3/2017 - Present      Health Maintenance        Date Due Completion Dates    IMM DTaP/Tdap/Td Vaccine (1 - Tdap) 2/4/1967 ---    IMM ZOSTER VACCINE 2/4/2008 ---    IMM INFLUENZA (1) 9/1/2017 12/1/2014    COLONOSCOPY 8/14/2023 8/14/2013 (Done)    Override on 8/14/2013: Done            Current Immunizations     13-VALENT PCV PREVNAR 8/2/2017    Influenza Vaccine Quad Inj (Preserved) 12/1/2014    Pneumococcal polysaccharide vaccine (PPSV-23) 2/27/2015 10:08 PM      Below and/or attached are the medications your provider expects you to take. Review all of your home medications and newly ordered medications with your provider and/or pharmacist. Follow medication instructions as directed by your provider and/or pharmacist. Please keep your medication list with you and share with your provider. Update the information when medications are discontinued, doses are changed, or new medications (including over-the-counter products) are added; and carry medication information at all times in the event of emergency situations     Allergies:  No Known Allergies          Medications  Valid as of: August 03, 2017 - 10:54 AM    Generic Name Brand Name Tablet Size Instructions for use    Albuterol Sulfate (Aero Soln) albuterol 108 (90 BASE) MCG/ACT Inhale 2 Puffs by mouth every four hours as needed.        Aspirin (Chew Tab) ASA 81 MG Take 1 Tab by mouth every day.        Atorvastatin Calcium (Tab) LIPITOR 40 MG Take 1 Tab by mouth every bedtime.        Carvedilol (Tab) COREG 6.25 MG Take 1 Tab by mouth 2 times a day, with meals.        Clopidogrel Bisulfate (Tab) PLAVIX 75 MG Take 1 Tab by mouth every day.        Docusate Sodium (Cap)  MG Take 100 mg by mouth every morning.          Fluticasone Furoate-Vilanterol (AEROSOL POWDER, BREATH ACTIVATED) Fluticasone Furoate-Vilanterol 100-25 MCG/INH Inhale 1 Puff by mouth every bedtime.        Hydrocodone-Acetaminophen (Tab) NORCO 5-325 MG Take 1-2 Tabs by mouth every 6 hours as needed.        Ipratropium-Albuterol (Solution) DUONEB 0.5-2.5 (3) MG/3ML 3 mL by Nebulization route every four hours as needed for Shortness of Breath.        Tiotropium Bromide Monohydrate (Cap) SPIRIVA 18 MCG Inhale 1 Cap by mouth every day.        .                 Medicines prescribed today were sent to:     [facility][id number] - [place], [place] - [address]    [address] [address]    Phone: [phone] Fax: [phone]    Open 24 Hours?: No      Medication refill instructions:       If your prescription bottle indicates you have medication refills left, it is not necessary to call your provider’s office. Please contact your pharmacy and they will refill your medication.    If your prescription bottle indicates you do not have any refills left, you may request refills at any time through one of the following ways: The online [facility] system (except Urgent Care), by calling your provider’s office, or by asking your pharmacy to contact your provider’s office with a refill request. Medication refills are processed only during regular business hours and may not be available until the next business day. Your provider may request additional information or to have a follow-up visit with you prior to refilling your medication.   *Please Note: Medication refills are assigned a new Rx number when refilled electronically. Your pharmacy may indicate that no refills were authorized even though a new prescription for the same medication is available at the pharmacy. Please request the medicine by name with the pharmacy before contacting your provider for a refill.        Your To Do List     Future Labs/Procedures Complete By Expires     ECHOCARDIOGRAM COMP W/O CONT  11/3/2017 8/4/2018         MyChart Access Code: Activation code not generated  Current MyChart Status: Active          Quit Tobacco Information     Do you want to quit using tobacco?    Quitting tobacco decreases risks of cancer, heart and lung disease, increases life expectancy, improves sense of taste and smell, and increases spending money, among other benefits.    If you are thinking about quitting, we can help.  • [facility] Quit Tobacco Program: 863.645.1946  o Program occurs weekly for four weeks and includes pharmacist consultation on products to support quitting smoking or chewing tobacco. A provider referral is needed for pharmacist consultation.  • Tobacco Users Help Hotline: 7-055-QUIT-NOW (592-6886) or https://[place].quitlogix.org/  o Free, confidential telephone and online coaching for [place] residents. Sessions are designed on a schedule that is convenient for you. Eligible clients receive free nicotine replacement therapy.  • Nationally: www.smokefree.gov  o Information and professional assistance to support both immediate and long-term needs as you become, and remain, a non-smoker. Smokefree.gov allows you to choose the help that best fits your needs.

## 2017-08-03 NOTE — PROGRESS NOTES
Subjective:   Evans Díaz is a 67 y.o. male who presents today for follow-up of recent CABG. He has a history of ischemic cardio myopathy and had significant improvement in his LV function with medication. He ultimately underwent CABG on June 1 with JIMBO to LAD and saphenous vein graft to right. He is made a slow but steady recovery. He complains of some cough. Recent chest x-ray revealed atelectasis but no significant effusion.    Past Medical History   Diagnosis Date   • Bronchitis    • Pulmonary hypertension (CMS-HCC)    • Chronic airway obstruction, not elsewhere classified    • Hypertension    • Congestive heart failure (CMS-HCC)    • Emphysema of lung (CMS-HCC)    • Coughing blood    • Breath shortness    • Renal disorder    • Cataract      surgery bilat     Past Surgical History   Procedure Laterality Date   • Cardiac cath  3/1/15     CAD but not CABG candidate.  99% RCA.   • Other       cataract surgery IOL bilat   • Multiple coronary artery bypass endo vein harvest  6/1/2017     Procedure: MULTIPLE CORONARY ARTERY BYPASS x2 ENDO VEIN HARVEST AND PREVENA DRESSING;  Surgeon: Dionisio Jones M.D.;  Location: SURGERY [facility];  Service:    • Elie  6/1/2017     Procedure: ELIE - INTRA-OPERATIVE;  Surgeon: Dionisio Jones M.D.;  Location: SURGERY [facility];  Service:      Family History   Problem Relation Age of Onset   • Heart Disease Brother 44     valve replacement     History   Smoking status   • Current Some Day Smoker -- 0.50 packs/day for 60 years   • Types: Cigarettes   Smokeless tobacco   • Never Used     No Known Allergies  Outpatient Encounter Prescriptions as of 8/3/2017   Medication Sig Dispense Refill   • Fluticasone Furoate-Vilanterol (BREO ELLIPTA) 100-25 MCG/INH AEROSOL POWDER, BREATH ACTIVATED Inhale 1 Puff by mouth every bedtime.     • albuterol 108 (90 BASE) MCG/ACT Aero Soln inhalation aerosol Inhale 2 Puffs by mouth every four hours as needed. 8.5 g    • atorvastatin  "(LIPITOR) 40 MG Tab Take 1 Tab by mouth every bedtime. 30 Tab    • clopidogrel (PLAVIX) 75 MG Tab Take 1 Tab by mouth every day. 30 Tab    • hydrocodone-acetaminophen (NORCO) 5-325 MG Tab per tablet Take 1-2 Tabs by mouth every 6 hours as needed. 20 Tab 0   • ipratropium-albuterol (DUONEB) 0.5-2.5 (3) MG/3ML nebulizer solution 3 mL by Nebulization route every four hours as needed for Shortness of Breath.     • tiotropium (SPIRIVA) 18 MCG Cap Inhale 1 Cap by mouth every day. 30 Cap 3   • carvedilol (COREG) 6.25 MG Tab Take 1 Tab by mouth 2 times a day, with meals. 60 Tab 11   • aspirin (ASA) 81 MG CHEW chewable tablet Take 1 Tab by mouth every day. 100 Tab 11   • [DISCONTINUED] furosemide (LASIX) 20 MG Tab Take 20 mg by mouth every day.     • docusate sodium 100 MG Cap Take 100 mg by mouth every morning. 60 Cap    • [DISCONTINUED] enalapril (VASOTEC) 5 MG Tab Take 2 Tabs by mouth every day. 30 Tab    • [DISCONTINUED] furosemide (LASIX) 10 MG/ML Solution 2 mL by Intravenous route every day.  0   • [DISCONTINUED] potassium chloride ER (KLOR-CON) 10 MEQ tablet Take 1 Tab by mouth every day. 60 Tab 3     No facility-administered encounter medications on file as of 8/3/2017.     Review of Systems   Constitutional: Negative.  Negative for malaise/fatigue.   Eyes: Negative for blurred vision.   Respiratory: Positive for cough and shortness of breath.    Cardiovascular: Negative for chest pain, palpitations and leg swelling.   Gastrointestinal: Negative for nausea and vomiting.   Neurological: Negative for dizziness and weakness.        Objective:   /72 mmHg  Pulse 82  Ht 1.753 m (5' 9.02\")  Wt 78.926 kg (174 lb)  BMI 25.68 kg/m2    Physical Exam   Constitutional: He is oriented to person, place, and time. He appears well-developed and well-nourished. No distress.   HENT:   Head: Normocephalic and atraumatic.   Eyes: Conjunctivae and EOM are normal. Pupils are equal, round, and reactive to light.   Neck: Neck " supple. No JVD present.   Cardiovascular: Normal rate and regular rhythm.    Murmur heard.   Systolic murmur is present with a grade of 1/6   Median sternotomy is well-healed, no erythema   Pulmonary/Chest: Effort normal and breath sounds normal. No respiratory distress. He has no wheezes. He has no rales.   Abdominal: Soft. There is no tenderness.   Musculoskeletal: He exhibits no edema.   Neurological: He is alert and oriented to person, place, and time.   Skin: Skin is warm and dry. He is not diaphoretic.   Psychiatric: He has a normal mood and affect.       Assessment:     1. Dilated cardiomyopathy (CMS-HCC)  ECHOCARDIOGRAM COMP W/O CONT   2. Essential hypertension  ECHOCARDIOGRAM COMP W/O CONT   3. Coronary artery disease involving native coronary artery of native heart without angina pectoris  ECHOCARDIOGRAM COMP W/O CONT   4. S/P CABG x 2  ECHOCARDIOGRAM COMP W/O CONT       Medical Decision Making:  Today's Assessment / Status / Plan:     The patient is making a good recovery and is back to work. He is still smoking cigarettes and sensation strategies were discussed.  At this point would recommend stopping furosemide, stopping potassium, lost so stop enalapril because of cough. He will call in the interim if any problems, otherwise return in 3 months with echo to reassess his LV function.

## 2017-08-04 ENCOUNTER — TELEPHONE (OUTPATIENT)
Dept: CARDIOLOGY | Facility: MEDICAL CENTER | Age: 67
End: 2017-08-04

## 2017-08-04 DIAGNOSIS — I50.20 SYSTOLIC CHF WITH REDUCED LEFT VENTRICULAR FUNCTION, NYHA CLASS 2 (HCC): ICD-10-CM

## 2017-08-04 RX ORDER — CARVEDILOL 6.25 MG/1
6.25 TABLET ORAL 2 TIMES DAILY WITH MEALS
Qty: 180 TAB | Refills: 3 | OUTPATIENT
Start: 2017-08-04 | End: 2018-08-09 | Stop reason: SDUPTHER

## 2017-08-04 RX ORDER — ATORVASTATIN CALCIUM 40 MG/1
40 TABLET, FILM COATED ORAL
Qty: 90 TAB | Refills: 3 | OUTPATIENT
Start: 2017-08-04 | End: 2018-08-09 | Stop reason: SDUPTHER

## 2017-08-04 RX ORDER — CLOPIDOGREL BISULFATE 75 MG/1
75 TABLET ORAL DAILY
Qty: 90 TAB | Refills: 3 | OUTPATIENT
Start: 2017-08-04 | End: 2019-06-12

## 2017-08-04 NOTE — TELEPHONE ENCOUNTER
Spoke with pt. Per his request, called refills for Carvedilol, Plavix, and Atorvastatin to pharmacy.

## 2017-08-04 NOTE — TELEPHONE ENCOUNTER
----- Message from Ayesha Gordillo sent at 8/4/2017 10:11 AM PDT -----  Regarding: patient has questions about his medications  PAULO/Shira      Patient has a few medications that will need to be refilled soon and he wants to make sure Dr. Jacob wants him to continue on the medication. He can be reached at [phone].

## 2017-08-23 ENCOUNTER — HOSPITAL ENCOUNTER (OUTPATIENT)
Dept: OTHER | Facility: MEDICAL CENTER | Age: 67
End: 2017-08-23
Attending: FAMILY MEDICINE
Payer: COMMERCIAL

## 2017-08-23 DIAGNOSIS — J44.9 CHRONIC OBSTRUCTIVE PULMONARY DISEASE, UNSPECIFIED COPD TYPE (HCC): ICD-10-CM

## 2017-08-23 PROCEDURE — 94060 EVALUATION OF WHEEZING: CPT

## 2017-08-23 PROCEDURE — 94726 PLETHYSMOGRAPHY LUNG VOLUMES: CPT

## 2017-08-23 PROCEDURE — 94729 DIFFUSING CAPACITY: CPT

## 2017-08-23 PROCEDURE — 94060 EVALUATION OF WHEEZING: CPT | Mod: 26 | Performed by: INTERNAL MEDICINE

## 2017-08-23 PROCEDURE — 94726 PLETHYSMOGRAPHY LUNG VOLUMES: CPT | Mod: 26 | Performed by: INTERNAL MEDICINE

## 2017-08-23 PROCEDURE — 94729 DIFFUSING CAPACITY: CPT | Mod: 26 | Performed by: INTERNAL MEDICINE

## 2017-08-23 ASSESSMENT — PULMONARY FUNCTION TESTS
FEV1/FVC: 68.44
FEV1: 1.95
FEV1/FVC_PERCENT_CHANGE: 167
FEV1_PREDICTED: 3.13
FVC_PREDICTED: 4.12
FEV1: 2.06
FEV1_PERCENT_CHANGE: 5
FVC_PERCENT_PREDICTED: 73
FEV1/FVC_PERCENT_PREDICTED: 76
FVC: 2.92
FEV1/FVC_PERCENT_PREDICTED: 89
FEV1/FVC: 67
FEV1/FVC_PERCENT_PREDICTED: 89
FVC: 3.01
FEV1_PERCENT_CHANGE: 3
FVC_PERCENT_PREDICTED: 70
FEV1_PERCENT_PREDICTED: 65
FEV1_PERCENT_PREDICTED: 62

## 2017-08-24 NOTE — PROCEDURES
DATE OF STUDY:  08/23/2017    FINDINGS:  The patient has severe reduction of mid flows at 0.75 liters per   second, 31% predicted, mild hyperinflation is present.  Reduction of total   lung capacity with hyperinflation suggests a combined obstructive with   superimposed restrictive process, clinical correlation required, oxygen   transfers mildly low at 80% predicted, good effort noted, flow volume loop   confirms the predominantly obstructive pattern.       ____________________________________     MD ZEKE PEÑA / TATYANA    DD:  08/24/2017 08:34:10  DT:  08/24/2017 09:48:05    D#:  [id number]  Job#:  [id number]

## 2017-11-02 ENCOUNTER — OFFICE VISIT (OUTPATIENT)
Dept: MEDICAL GROUP | Facility: MEDICAL CENTER | Age: 67
End: 2017-11-02
Payer: COMMERCIAL

## 2017-11-02 VITALS
RESPIRATION RATE: 14 BRPM | DIASTOLIC BLOOD PRESSURE: 80 MMHG | SYSTOLIC BLOOD PRESSURE: 122 MMHG | HEIGHT: 69 IN | BODY MASS INDEX: 25.53 KG/M2 | TEMPERATURE: 98.6 F | OXYGEN SATURATION: 97 % | HEART RATE: 67 BPM | WEIGHT: 172.4 LBS

## 2017-11-02 DIAGNOSIS — I10 ESSENTIAL HYPERTENSION: ICD-10-CM

## 2017-11-02 DIAGNOSIS — J44.9 CHRONIC OBSTRUCTIVE PULMONARY DISEASE, UNSPECIFIED COPD TYPE (HCC): ICD-10-CM

## 2017-11-02 DIAGNOSIS — R22.41 LOCALIZED SWELLING, MASS, OR LUMP OF RIGHT LOWER EXTREMITY: ICD-10-CM

## 2017-11-02 DIAGNOSIS — Z95.1 S/P CABG X 2: ICD-10-CM

## 2017-11-02 DIAGNOSIS — E78.5 HYPERLIPIDEMIA, UNSPECIFIED HYPERLIPIDEMIA TYPE: ICD-10-CM

## 2017-11-02 DIAGNOSIS — F17.200 TOBACCO DEPENDENCE: ICD-10-CM

## 2017-11-02 PROCEDURE — 99214 OFFICE O/P EST MOD 30 MIN: CPT | Performed by: FAMILY MEDICINE

## 2017-11-02 RX ORDER — TIOTROPIUM BROMIDE 18 UG/1
18 CAPSULE ORAL; RESPIRATORY (INHALATION) DAILY
Qty: 30 CAP | Refills: 3 | Status: SHIPPED | OUTPATIENT
Start: 2017-11-02 | End: 2018-03-07 | Stop reason: SDUPTHER

## 2017-11-02 NOTE — PROGRESS NOTES
CC: lump on the right thigh/ other chronic medical issues.    HPI:   Evans presents today multiple medical issues:     He has been having a lump on the right thigh that he noticed a week and a month ago. Denies any recent h/o trauma to the thigh, denies any pain on the thigh, however he feels a little bit tender when he presses on it. Denies any weight loss, night sweats, bone pain, or loss of appetite.    Coronary artery disease, S/P CABG x 2 in 6/1/2017. Has been stable, denies chest pain, and SOB.He follows up with cardiology Dr Jacob.     Chronic obstructive pulmonary disease, he has been stable, mostly asymptomatic .Has not been taking his Breo Ellipta inhaler , and the Spiriva because it was not refilled for him. Has not been needing the albuterol.    Essential hypertension, BP has been adequately controlled on current medication. Denies headache, chest pain, and SOB.he has been on Enalapril 5 mg daily.No side effects.     Hyperlipidemia, he has been tolerating the statin. Denies muscle pain LFTs has been normal, he has been on lipitor 40 mg daily.     He smokes about 10-15 cig a day, smoking cessation discussed with the patient he stated that he probably will cut down but he will not be able to stop because he will get nonfunctional and then he will not be able to work , probably he will start thinking of cessation serious after he retired.     Patient Active Problem List    Diagnosis Date Noted   • Dilated cardiomyopathy (CMS-HCC) 02/28/2015     Priority: High   • COPD exacerbation (CMS-HCC) 02/27/2015     Priority: Medium   • S/P CABG x 2 08/03/2017   • Chronic obstructive pulmonary disease (CMS-HCC) 08/02/2017   • Essential hypertension 08/02/2017   • Hyperlipidemia 08/02/2017   • Cigarette smoker 10/06/2016   • CAD (coronary artery disease) 06/26/2015   • Systolic CHF with reduced left ventricular function, NYHA class 2 (CMS-HCC) 02/28/2015       Current Outpatient Prescriptions   Medication Sig Dispense  "Refill   • Fluticasone Furoate-Vilanterol (BREO ELLIPTA) 100-25 MCG/INH AEROSOL POWDER, BREATH ACTIVATED Inhale 1 Puff by mouth every bedtime. 3 Each 3   • tiotropium (SPIRIVA) 18 MCG Cap Inhale 1 Cap by mouth every day. 30 Cap 3   • atorvastatin (LIPITOR) 40 MG Tab Take 1 Tab by mouth every bedtime. 90 Tab 3   • clopidogrel (PLAVIX) 75 MG Tab Take 1 Tab by mouth every day. 90 Tab 3   • carvedilol (COREG) 6.25 MG Tab Take 1 Tab by mouth 2 times a day, with meals. 180 Tab 3   • docusate sodium 100 MG Cap Take 100 mg by mouth every morning. 60 Cap    • aspirin (ASA) 81 MG CHEW chewable tablet Take 1 Tab by mouth every day. 100 Tab 11   • albuterol 108 (90 BASE) MCG/ACT Aero Soln inhalation aerosol Inhale 2 Puffs by mouth every four hours as needed. 8.5 g    • hydrocodone-acetaminophen (NORCO) 5-325 MG Tab per tablet Take 1-2 Tabs by mouth every 6 hours as needed. 20 Tab 0   • ipratropium-albuterol (DUONEB) 0.5-2.5 (3) MG/3ML nebulizer solution 3 mL by Nebulization route every four hours as needed for Shortness of Breath.       No current facility-administered medications for this visit.          Allergies as of 11/02/2017   • (No Known Allergies)        ROS: Denies any chest pain, Shortness of breath, Changes bowel or bladder, Lower extremity edema.    Physical Exam:  /80   Pulse 67   Temp 37 °C (98.6 °F)   Resp 14   Ht 1.753 m (5' 9\")   Wt 78.2 kg (172 lb 6.4 oz)   SpO2 97%   BMI 25.46 kg/m²   Gen.: Well-developed, well-nourished, no apparent distress,pleasant and cooperative with the examination  Skin:  Warm and dry with good turgor. No rashes or suspicious lesions in visible areas  HEENT:Sinuses nontender with palpation, TMs clear, nares patent with pink mucosa and clear rhinorrhea,no septal deviation ,polyps or lesions. lips without lesions, oropharynx clear.  Neck: Trachea midline,no masses or adenopathy. No JVD.  Cor: Regular rate and rhythm without murmur, gallop or rub.  Lungs: Respirations " unlabored.Clear to auscultation with equal breath sounds bilaterally. No wheezes, rhonchi.  Extremities: No cyanosis, clubbing or edema. A lump on the lower right thigh , elongated probably intramuscular, slightly tender.      Assessment and Plan.   67 y.o. male     1. S/P CABG x 2  Has been stable, and asymptomatic.  Continue on statin, BB, and plavix.    2. Chronic obstructive pulmonary disease, unspecified COPD type (CMS-[facility])  Chronic.Stable, asymptomatic.  Has not been taking the Breo and the Spiriva.  Advised to continue on Breo Ellipta inhaler , and spiriva daily, and albuterol as needed.medication refill sent    - Fluticasone Furoate-Vilanterol (BREO ELLIPTA) 100-25 MCG/INH AEROSOL POWDER, BREATH ACTIVATED; Inhale 1 Puff by mouth every bedtime.  Dispense: 3 Each; Refill: 3  - tiotropium (SPIRIVA) 18 MCG Cap; Inhale 1 Cap by mouth every day.  Dispense: 30 Cap; Refill: 3    3. Essential hypertension  Has been adequately controlled on current medication. Denies headache, chest pain, and SOB.  Continue on Enalapril 5 mg daily.       4. Hyperlipidemia, unspecified hyperlipidemia type  He has been tolerating the statin. Denies muscle pain LFTs has been normal  Continue on lipitor 40 mg daily.    5. Localized swelling, mass, or lump of right lower extremity  Unclear cause.  Will send for an US.    - US-EXTREMITY NON VASCULAR UNILATERAL RIGHT; Future    6. Tobacco dependence  Discussed cessation. Not ready, however he said he will try to decrease the number of cigarettes as tolerated.

## 2017-11-08 ENCOUNTER — HOSPITAL ENCOUNTER (OUTPATIENT)
Dept: CARDIOLOGY | Facility: MEDICAL CENTER | Age: 67
End: 2017-11-08
Attending: INTERNAL MEDICINE
Payer: COMMERCIAL

## 2017-11-08 DIAGNOSIS — I25.10 CORONARY ARTERY DISEASE INVOLVING NATIVE CORONARY ARTERY OF NATIVE HEART WITHOUT ANGINA PECTORIS: ICD-10-CM

## 2017-11-08 DIAGNOSIS — I42.0 DILATED CARDIOMYOPATHY (HCC): ICD-10-CM

## 2017-11-08 DIAGNOSIS — I10 ESSENTIAL HYPERTENSION: ICD-10-CM

## 2017-11-08 DIAGNOSIS — Z95.1 S/P CABG X 2: ICD-10-CM

## 2017-11-08 PROCEDURE — 93306 TTE W/DOPPLER COMPLETE: CPT | Mod: 26 | Performed by: INTERNAL MEDICINE

## 2017-11-08 PROCEDURE — 93306 TTE W/DOPPLER COMPLETE: CPT

## 2017-11-09 LAB
LV EJECT FRACT  99904: 60
LV EJECT FRACT MOD 2C 99903: 54
LV EJECT FRACT MOD 4C 99902: 64.05
LV EJECT FRACT MOD BP 99901: 59.62

## 2017-11-10 ENCOUNTER — TELEPHONE (OUTPATIENT)
Dept: CARDIOLOGY | Facility: MEDICAL CENTER | Age: 67
End: 2017-11-10

## 2017-11-10 NOTE — TELEPHONE ENCOUNTER
----- Message from Carmelo Jacob M.D. sent at 11/10/2017  7:37 AM PST -----  Echo shows good recovery of heart muscle.  EF is 60% all segments move normally except back wall which is still sluggish,  I'm happy with the result. Same meds

## 2017-11-13 ENCOUNTER — TELEPHONE (OUTPATIENT)
Dept: CARDIOLOGY | Facility: MEDICAL CENTER | Age: 67
End: 2017-11-13

## 2017-11-14 NOTE — TELEPHONE ENCOUNTER
----- Message from Carmelo Jacob M.D. sent at 11/13/2017  3:24 PM PST -----  Echop shows evidence of prior inferior wall MI, but overall function is  is good.Can stop diuretic and potassium all together if he has not already done that

## 2017-11-15 ENCOUNTER — HOSPITAL ENCOUNTER (OUTPATIENT)
Dept: RADIOLOGY | Facility: MEDICAL CENTER | Age: 67
End: 2017-11-15
Attending: FAMILY MEDICINE
Payer: COMMERCIAL

## 2017-11-15 DIAGNOSIS — R22.41 LOCALIZED SWELLING, MASS, OR LUMP OF RIGHT LOWER EXTREMITY: ICD-10-CM

## 2017-11-15 PROCEDURE — 76882 US LMTD JT/FCL EVL NVASC XTR: CPT | Mod: RT

## 2017-11-16 ENCOUNTER — OFFICE VISIT (OUTPATIENT)
Dept: CARDIOLOGY | Facility: MEDICAL CENTER | Age: 67
End: 2017-11-16
Payer: COMMERCIAL

## 2017-11-16 VITALS
BODY MASS INDEX: 25.18 KG/M2 | HEIGHT: 69 IN | WEIGHT: 170 LBS | SYSTOLIC BLOOD PRESSURE: 130 MMHG | OXYGEN SATURATION: 98 % | HEART RATE: 78 BPM | DIASTOLIC BLOOD PRESSURE: 80 MMHG

## 2017-11-16 DIAGNOSIS — J43.8 OTHER EMPHYSEMA (HCC): ICD-10-CM

## 2017-11-16 DIAGNOSIS — I25.10 CORONARY ARTERY DISEASE INVOLVING NATIVE CORONARY ARTERY OF NATIVE HEART WITHOUT ANGINA PECTORIS: ICD-10-CM

## 2017-11-16 DIAGNOSIS — E78.00 PURE HYPERCHOLESTEROLEMIA: ICD-10-CM

## 2017-11-16 DIAGNOSIS — Z95.1 S/P CABG X 2: ICD-10-CM

## 2017-11-16 PROCEDURE — 99213 OFFICE O/P EST LOW 20 MIN: CPT | Performed by: INTERNAL MEDICINE

## 2017-11-16 ASSESSMENT — ENCOUNTER SYMPTOMS
NAUSEA: 0
WEAKNESS: 0
BLURRED VISION: 0
DIZZINESS: 0
VOMITING: 0
COUGH: 1
SHORTNESS OF BREATH: 1
PALPITATIONS: 0
CONSTITUTIONAL NEGATIVE: 1

## 2017-11-16 NOTE — PROGRESS NOTES
Subjective:   Evans Díaz is a 67 y.o. male who presents today for follow-up of recent CABG. He underwent CABG on June 1 with JIMBO to LAD and saphenous vein graft to right. He said no exertional dyspnea or chest pain and no edema. Unfortunately he is still smoking cigarettes. The patient is due for laboratory testing.    Past Medical History:   Diagnosis Date   • Breath shortness    • Bronchitis    • Cataract     surgery bilat   • Chronic airway obstruction, not elsewhere classified    • Congestive heart failure (CMS-HCC)    • Coughing blood    • Emphysema of lung (CMS-[facility])    • Hypertension    • Pulmonary hypertension    • Renal disorder      Past Surgical History:   Procedure Laterality Date   • MULTIPLE CORONARY ARTERY BYPASS ENDO VEIN HARVEST  6/1/2017    Procedure: MULTIPLE CORONARY ARTERY BYPASS x2 ENDO VEIN HARVEST AND PREVENA DRESSING;  Surgeon: Dionisio Jones M.D.;  Location: SURGERY [facility];  Service:    • HELLEN  6/1/2017    Procedure: HELLEN - INTRA-OPERATIVE;  Surgeon: Dionisio Jones M.D.;  Location: SURGERY [facility];  Service:    • OTHER      cataract surgery IOL bilat   • CARDIAC CATH  3/1/15    CAD but not CABG candidate.  99% RCA.     Family History   Problem Relation Age of Onset   • Heart Disease Brother 44     valve replacement     History   Smoking Status   • Current Some Day Smoker   • Packs/day: 0.50   • Years: 60.00   • Types: Cigarettes   Smokeless Tobacco   • Never Used     No Known Allergies  Outpatient Encounter Prescriptions as of 11/16/2017   Medication Sig Dispense Refill   • Fluticasone Furoate-Vilanterol (BREO ELLIPTA) 100-25 MCG/INH AEROSOL POWDER, BREATH ACTIVATED Inhale 1 Puff by mouth every bedtime. 3 Each 3   • tiotropium (SPIRIVA) 18 MCG Cap Inhale 1 Cap by mouth every day. 30 Cap 3   • atorvastatin (LIPITOR) 40 MG Tab Take 1 Tab by mouth every bedtime. 90 Tab 3   • clopidogrel (PLAVIX) 75 MG Tab Take 1 Tab by mouth every day. 90 Tab 3   • carvedilol  "(COREG) 6.25 MG Tab Take 1 Tab by mouth 2 times a day, with meals. 180 Tab 3   • albuterol 108 (90 BASE) MCG/ACT Aero Soln inhalation aerosol Inhale 2 Puffs by mouth every four hours as needed. 8.5 g    • ipratropium-albuterol (DUONEB) 0.5-2.5 (3) MG/3ML nebulizer solution 3 mL by Nebulization route every four hours as needed for Shortness of Breath.     • aspirin (ASA) 81 MG CHEW chewable tablet Take 1 Tab by mouth every day. 100 Tab 11   • docusate sodium 100 MG Cap Take 100 mg by mouth every morning. 60 Cap    • hydrocodone-acetaminophen (NORCO) 5-325 MG Tab per tablet Take 1-2 Tabs by mouth every 6 hours as needed. 20 Tab 0     No facility-administered encounter medications on file as of 11/16/2017.      Review of Systems   Constitutional: Negative.  Negative for malaise/fatigue.   Eyes: Negative for blurred vision.   Respiratory: Positive for cough and shortness of breath.    Cardiovascular: Negative for chest pain, palpitations and leg swelling.   Gastrointestinal: Negative for nausea and vomiting.   Neurological: Negative for dizziness and weakness.        Objective:   /80   Pulse 78   Ht 1.753 m (5' 9\")   Wt 77.1 kg (170 lb)   SpO2 98%   BMI 25.10 kg/m²     Physical Exam   Constitutional: He is oriented to person, place, and time. He appears well-developed and well-nourished. No distress.   HENT:   Head: Normocephalic and atraumatic.   Eyes: Conjunctivae and EOM are normal. Pupils are equal, round, and reactive to light.   Neck: Neck supple. No JVD present.   Cardiovascular: Normal rate and regular rhythm.    Murmur heard.   Systolic murmur is present with a grade of 1/6   Median sternotomy is well-healed, no erythema   Pulmonary/Chest: Effort normal and breath sounds normal. No respiratory distress. He has no wheezes. He has no rales.   Abdominal: Soft. There is no tenderness.   Musculoskeletal: He exhibits no edema.   Neurological: He is alert and oriented to person, place, and time.   Skin: " Skin is warm and dry. He is not diaphoretic.   Psychiatric: He has a normal mood and affect.       Assessment:     1. Coronary artery disease involving native coronary artery of native heart without angina pectoris     2. Other emphysema (CMS-HCC)     3. S/P CABG x 2  LIPID PROFILE   4. Pure hypercholesterolemia  LIPID PROFILE       Medical Decision Making:  Today's Assessment / Status / Plan:   He is now nearly 6 months post CABG and doing well. Blood pressure is 120 systolic. Obtain lipid profile. Smoking cessation again discussed.   Return in 6 months and then probably yearly thereafter.

## 2017-12-19 ENCOUNTER — OFFICE VISIT (OUTPATIENT)
Dept: MEDICAL GROUP | Facility: MEDICAL CENTER | Age: 67
End: 2017-12-19
Payer: COMMERCIAL

## 2017-12-19 VITALS
WEIGHT: 183 LBS | BODY MASS INDEX: 27.11 KG/M2 | HEART RATE: 97 BPM | RESPIRATION RATE: 16 BRPM | OXYGEN SATURATION: 93 % | SYSTOLIC BLOOD PRESSURE: 160 MMHG | DIASTOLIC BLOOD PRESSURE: 92 MMHG | HEIGHT: 69 IN | TEMPERATURE: 98 F

## 2017-12-19 DIAGNOSIS — J44.1 COPD EXACERBATION (HCC): ICD-10-CM

## 2017-12-19 PROCEDURE — 99214 OFFICE O/P EST MOD 30 MIN: CPT | Performed by: FAMILY MEDICINE

## 2017-12-19 RX ORDER — PREDNISONE 10 MG/1
TABLET ORAL
Qty: 15 TAB | Refills: 0 | Status: SHIPPED | OUTPATIENT
Start: 2017-12-19 | End: 2019-06-12

## 2017-12-19 RX ORDER — DOXYCYCLINE HYCLATE 100 MG
100 TABLET ORAL 2 TIMES DAILY
Qty: 14 TAB | Refills: 0 | Status: SHIPPED | OUTPATIENT
Start: 2017-12-19 | End: 2017-12-26

## 2017-12-19 NOTE — PROGRESS NOTES
CC: Cough/ SOB    HPI:   Evans has h/o COPD presents today cough , and SOB. Condition started a week a go. He caught a cold from his grandson, he started to have a running nose, and cough, and gradually gets worse, the cough becomes productive with yellow thick phlegm. Denies fever, and chest pain. Has been using otc cough suppressant has been working but still have the SOB, and the phlegm. Has been on Breo Ellipta and spir tasha daily, takes the albuterol as needed, lately has been taking it more frequent.      Patient Active Problem List    Diagnosis Date Noted   • Dilated cardiomyopathy (CMS-HCC) 02/28/2015     Priority: High   • COPD exacerbation (CMS-HCC) 02/27/2015     Priority: Medium   • S/P CABG x 2 08/03/2017   • Chronic obstructive pulmonary disease (CMS-HCC) 08/02/2017   • Essential hypertension 08/02/2017   • Hyperlipidemia 08/02/2017   • Cigarette smoker 10/06/2016   • CAD (coronary artery disease) 06/26/2015   • Systolic CHF with reduced left ventricular function, NYHA class 2 (CMS-HCC) 02/28/2015       Current Outpatient Prescriptions   Medication Sig Dispense Refill   • predniSONE (DELTASONE) 10 MG Tab Take 20 mg bid for 2 days, 10 mg bid for 2 days, 10 mg daily for 2 days, 5 mg daily for 2 days 15 Tab 0   • doxycycline (VIBRAMYCIN) 100 MG Tab Take 1 Tab by mouth 2 times a day for 7 days. 14 Tab 0   • Fluticasone Furoate-Vilanterol (BREO ELLIPTA) 100-25 MCG/INH AEROSOL POWDER, BREATH ACTIVATED Inhale 1 Puff by mouth every bedtime. 3 Each 3   • tiotropium (SPIRIVA) 18 MCG Cap Inhale 1 Cap by mouth every day. 30 Cap 3   • atorvastatin (LIPITOR) 40 MG Tab Take 1 Tab by mouth every bedtime. 90 Tab 3   • clopidogrel (PLAVIX) 75 MG Tab Take 1 Tab by mouth every day. 90 Tab 3   • carvedilol (COREG) 6.25 MG Tab Take 1 Tab by mouth 2 times a day, with meals. 180 Tab 3   • albuterol 108 (90 BASE) MCG/ACT Aero Soln inhalation aerosol Inhale 2 Puffs by mouth every four hours as needed. 8.5 g    • docusate sodium  "100 MG Cap Take 100 mg by mouth every morning. 60 Cap    • hydrocodone-acetaminophen (NORCO) 5-325 MG Tab per tablet Take 1-2 Tabs by mouth every 6 hours as needed. 20 Tab 0   • ipratropium-albuterol (DUONEB) 0.5-2.5 (3) MG/3ML nebulizer solution 3 mL by Nebulization route every four hours as needed for Shortness of Breath.     • aspirin (ASA) 81 MG CHEW chewable tablet Take 1 Tab by mouth every day. 100 Tab 11     No current facility-administered medications for this visit.          Allergies as of 12/19/2017   • (No Known Allergies)        ROS: Denies any chest pain, Shortness of breath, Changes bowel or bladder, Lower extremity edema.    Physical Exam:  /92   Pulse 97   Temp 36.7 °C (98 °F)   Resp 16   Ht 1.753 m (5' 9\")   Wt 83 kg (183 lb)   SpO2 93%   BMI 27.02 kg/m²   Gen.: Well-developed, well-nourished, no apparent distress,pleasant and cooperative with the examination  HEENT:Sinuses nontender with palpation, TMs clear, nares patent with pink mucosa and clear rhinorrhea,no septal deviation ,polyps or lesions. lips without lesions, oropharynx clear.  Neck: Trachea midline,no masses or adenopathy. No JVD.  Cor: Regular rate and rhythm without murmur, gallop or rub.  Lungs: Respirations unlabored.Prolonged expiration, and expiratory wheezes.  Extremities: No cyanosis, clubbing or edema.        Assessment and Plan.   67 y.o. male     1. COPD exacerbation (CMS-[facility])  Oral steroids ( tapering dose)  Oral antibiotics for a week  Albuterol q 6 hrs for 48 hrs, then needed  Continue on Breo Ellipta and Spiriva daily.  Continue otc cough suppressant as needed.  Rest  Hydration  RTC/ER if condition gets worse or persist.      - predniSONE (DELTASONE) 10 MG Tab; Take 20 mg bid for 2 days, 10 mg bid for 2 days, 10 mg daily for 2 days, 5 mg daily for 2 days  Dispense: 15 Tab; Refill: 0  - doxycycline (VIBRAMYCIN) 100 MG Tab; Take 1 Tab by mouth 2 times a day for 7 days.  Dispense: 14 Tab; Refill: 0      "

## 2018-03-07 DIAGNOSIS — J44.9 CHRONIC OBSTRUCTIVE PULMONARY DISEASE, UNSPECIFIED COPD TYPE (HCC): ICD-10-CM

## 2018-03-07 RX ORDER — TIOTROPIUM BROMIDE 18 UG/1
18 CAPSULE ORAL; RESPIRATORY (INHALATION) DAILY
Qty: 30 CAP | Refills: 3 | Status: SHIPPED | OUTPATIENT
Start: 2018-03-07 | End: 2018-07-08 | Stop reason: SDUPTHER

## 2018-03-09 ENCOUNTER — PATIENT MESSAGE (OUTPATIENT)
Dept: HEALTH INFORMATION MANAGEMENT | Facility: OTHER | Age: 68
End: 2018-03-09

## 2018-03-21 ENCOUNTER — APPOINTMENT (OUTPATIENT)
Dept: MEDICAL GROUP | Facility: MEDICAL CENTER | Age: 68
End: 2018-03-21
Payer: COMMERCIAL

## 2018-04-06 ENCOUNTER — HOSPITAL ENCOUNTER (OUTPATIENT)
Dept: LAB | Facility: MEDICAL CENTER | Age: 68
End: 2018-04-06
Attending: INTERNAL MEDICINE
Payer: COMMERCIAL

## 2018-04-06 DIAGNOSIS — E78.00 PURE HYPERCHOLESTEROLEMIA: ICD-10-CM

## 2018-04-06 DIAGNOSIS — Z95.1 S/P CABG X 2: ICD-10-CM

## 2018-04-06 LAB
CHOLEST SERPL-MCNC: 118 MG/DL (ref 100–199)
HDLC SERPL-MCNC: 30 MG/DL
LDLC SERPL CALC-MCNC: 72 MG/DL
TRIGL SERPL-MCNC: 79 MG/DL (ref 0–149)

## 2018-04-06 PROCEDURE — 36415 COLL VENOUS BLD VENIPUNCTURE: CPT

## 2018-04-06 PROCEDURE — 80061 LIPID PANEL: CPT

## 2018-04-09 ENCOUNTER — TELEPHONE (OUTPATIENT)
Dept: CARDIOLOGY | Facility: MEDICAL CENTER | Age: 68
End: 2018-04-09

## 2018-04-09 NOTE — TELEPHONE ENCOUNTER
----- Message from Carmelo Jacob M.D. sent at 4/9/2018  8:09 AM PDT -----  LDL has dropped from 128 a year ago to 72, which is perfect. Continue same dose of statin,

## 2018-05-09 ENCOUNTER — OFFICE VISIT (OUTPATIENT)
Dept: CARDIOLOGY | Facility: MEDICAL CENTER | Age: 68
End: 2018-05-09
Payer: COMMERCIAL

## 2018-05-09 VITALS
DIASTOLIC BLOOD PRESSURE: 80 MMHG | BODY MASS INDEX: 26.66 KG/M2 | SYSTOLIC BLOOD PRESSURE: 142 MMHG | WEIGHT: 180 LBS | HEART RATE: 88 BPM | HEIGHT: 69 IN

## 2018-05-09 DIAGNOSIS — I25.10 CORONARY ARTERY DISEASE INVOLVING NATIVE CORONARY ARTERY OF NATIVE HEART WITHOUT ANGINA PECTORIS: ICD-10-CM

## 2018-05-09 DIAGNOSIS — Z95.1 S/P CABG X 2: ICD-10-CM

## 2018-05-09 DIAGNOSIS — I50.20 SYSTOLIC CHF WITH REDUCED LEFT VENTRICULAR FUNCTION, NYHA CLASS 2 (HCC): ICD-10-CM

## 2018-05-09 DIAGNOSIS — E78.00 PURE HYPERCHOLESTEROLEMIA: ICD-10-CM

## 2018-05-09 DIAGNOSIS — F17.210 CIGARETTE SMOKER: ICD-10-CM

## 2018-05-09 DIAGNOSIS — M96.89 NONUNION OF STERNUM AFTER STERNOTOMY: ICD-10-CM

## 2018-05-09 PROCEDURE — 99213 OFFICE O/P EST LOW 20 MIN: CPT | Mod: 25 | Performed by: INTERNAL MEDICINE

## 2018-05-09 PROCEDURE — 99406 BEHAV CHNG SMOKING 3-10 MIN: CPT | Performed by: INTERNAL MEDICINE

## 2018-05-09 ASSESSMENT — ENCOUNTER SYMPTOMS
WEAKNESS: 0
MUSCULOSKELETAL NEGATIVE: 1
VOMITING: 0
NAUSEA: 0
PALPITATIONS: 0
CONSTITUTIONAL NEGATIVE: 1
SHORTNESS OF BREATH: 1

## 2018-05-09 NOTE — PROGRESS NOTES
"Chief Complaint   Patient presents with   • Coronary Artery Bypass Graft (CABG)     Pt would like Lasix back       Subjective:   Chief complaint follow-up of CABG.  Evans Díaz is a 68 y.o. male who presents today for follow-up of the above problem.  He underwent bypass graft surgery in June 2017 with JIMBO to LAD saphenous vein graft RCA.  He has had no chest pain or exertional dyspnea.  Unfortunately he is still smoking cigarettes.  Lab was reviewed and LDL was at target at 72.  He complains of a \"popping\" sound when he takes a deep breath.  No associated pain.  Echo from November, 2017, reveals improvement in LV function with EF of 60%.  He was 40% on admission to the hospital with his MI.  He still has basal to mid inferior wall akinesis.  Past Medical History:   Diagnosis Date   • Breath shortness    • Bronchitis    • Cataract     surgery bilat   • Chronic airway obstruction, not elsewhere classified    • Congestive heart failure (HCC)    • Coughing blood    • Emphysema of lung (HCC)    • Hypertension    • Pulmonary hypertension (HCC)    • Renal disorder      Past Surgical History:   Procedure Laterality Date   • MULTIPLE CORONARY ARTERY BYPASS ENDO VEIN HARVEST  6/1/2017    Procedure: MULTIPLE CORONARY ARTERY BYPASS x2 ENDO VEIN HARVEST AND PREVENA DRESSING;  Surgeon: Dionisio Jones M.D.;  Location: SURGERY [facility];  Service:    • HELLEN  6/1/2017    Procedure: HELLEN - INTRA-OPERATIVE;  Surgeon: Dionisio Jones M.D.;  Location: SURGERY [facility];  Service:    • OTHER      cataract surgery IOL bilat   • CARDIAC CATH  3/1/15    CAD but not CABG candidate.  99% RCA.     Family History   Problem Relation Age of Onset   • Heart Disease Brother 44     valve replacement     Social History     Social History   • Marital status:      Spouse name: N/A   • Number of children: N/A   • Years of education: N/A     Occupational History   • Not on file.     Social History Main Topics   • Smoking " status: Current Some Day Smoker     Packs/day: 0.50     Years: 60.00     Types: Cigarettes   • Smokeless tobacco: Never Used   • Alcohol use No   • Drug use: No   • Sexual activity: Not on file     Other Topics Concern   • Not on file     Social History Narrative   • No narrative on file     No Known Allergies  Outpatient Encounter Prescriptions as of 5/9/2018   Medication Sig Dispense Refill   • BREO ELLIPTA 100-25 MCG/INH AEROSOL POWDER, BREATH ACTIVATED INHALE 1 PUFF BY MOUTH EVERY BEDTIME (MAX 30 DAYS PER INSURANCE) 60 Each 3   • SPIRIVA HANDIHALER 18 MCG Cap INHALE 1 CAP BY MOUTH EVERY DAY. 30 Cap 3   • atorvastatin (LIPITOR) 40 MG Tab Take 1 Tab by mouth every bedtime. 90 Tab 3   • carvedilol (COREG) 6.25 MG Tab Take 1 Tab by mouth 2 times a day, with meals. 180 Tab 3   • albuterol 108 (90 BASE) MCG/ACT Aero Soln inhalation aerosol Inhale 2 Puffs by mouth every four hours as needed. 8.5 g    • aspirin (ASA) 81 MG CHEW chewable tablet Take 1 Tab by mouth every day. 100 Tab 11   • predniSONE (DELTASONE) 10 MG Tab Take 20 mg bid for 2 days, 10 mg bid for 2 days, 10 mg daily for 2 days, 5 mg daily for 2 days 15 Tab 0   • clopidogrel (PLAVIX) 75 MG Tab Take 1 Tab by mouth every day. 90 Tab 3   • docusate sodium 100 MG Cap Take 100 mg by mouth every morning. 60 Cap    • hydrocodone-acetaminophen (NORCO) 5-325 MG Tab per tablet Take 1-2 Tabs by mouth every 6 hours as needed. 20 Tab 0   • ipratropium-albuterol (DUONEB) 0.5-2.5 (3) MG/3ML nebulizer solution 3 mL by Nebulization route every four hours as needed for Shortness of Breath.       No facility-administered encounter medications on file as of 5/9/2018.      Review of Systems   Constitutional: Negative.  Negative for malaise/fatigue.   Respiratory: Positive for shortness of breath.    Cardiovascular: Negative for chest pain, palpitations and leg swelling.   Gastrointestinal: Negative for nausea and vomiting.   Musculoskeletal: Negative.         Feels chest  "\"popping\" with deep breathing   Neurological: Negative for weakness.        Objective:   /80   Pulse 88   Ht 1.753 m (5' 9\")   Wt 81.6 kg (180 lb)   BMI 26.58 kg/m²     Physical Exam   Constitutional: He is oriented to person, place, and time. No distress.   HENT:   Head: Normocephalic and atraumatic.   Cardiovascular: Normal rate and regular rhythm.    No murmur heard.  Pulmonary/Chest: No respiratory distress. He has no wheezes.   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 Bowel sounds are normal. He exhibits no distension.   Musculoskeletal: He exhibits no edema.   Neurological: He is alert and oriented to person, place, and time.   Skin: Skin is warm and dry.   Psychiatric: He has a normal mood and affect.       Assessment:     1. Pure hypercholesterolemia     2. Cigarette smoker     3. Coronary artery disease involving native coronary artery of native heart without angina pectoris     4. S/P CABG x 2     5. Systolic CHF with reduced left ventricular function, NYHA class 2 ([facility])         Medical Decision Making:  Today's Assessment / Status / Plan:   Status post CABG: Doing well no angina.  EF has improved post surgery.  Hyperlipidemia: LDL was at target on his current medications.    Hypertension: Controlled on current medications.    Cigarette smoking: Discussed cessation today with patient discussed use of Chantix.  His daughter smokes as well.  He will discuss cessation with his daughter.  If he wants to try Chantix we will prescribe it.    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  He does hear \"popping sound\" he has evidence of nonunion by exam.    Return one year  "

## 2018-06-29 ENCOUNTER — TELEPHONE (OUTPATIENT)
Dept: CARDIOLOGY | Facility: MEDICAL CENTER | Age: 68
End: 2018-06-29

## 2018-07-08 DIAGNOSIS — J44.9 CHRONIC OBSTRUCTIVE PULMONARY DISEASE, UNSPECIFIED COPD TYPE (HCC): ICD-10-CM

## 2018-07-09 RX ORDER — TIOTROPIUM BROMIDE 18 UG/1
18 CAPSULE ORAL; RESPIRATORY (INHALATION) DAILY
Qty: 90 CAP | Refills: 3 | Status: SHIPPED | OUTPATIENT
Start: 2018-07-09 | End: 2019-08-10 | Stop reason: SDUPTHER

## 2018-07-31 RX ORDER — VARENICLINE TARTRATE 1 MG/1
1 TABLET, FILM COATED ORAL 2 TIMES DAILY
Qty: 60 TAB | Refills: 3 | Status: SHIPPED | OUTPATIENT
Start: 2018-07-31 | End: 2019-06-12

## 2018-08-02 ENCOUNTER — PATIENT MESSAGE (OUTPATIENT)
Dept: HEALTH INFORMATION MANAGEMENT | Facility: OTHER | Age: 68
End: 2018-08-02

## 2018-08-09 DIAGNOSIS — I50.20 SYSTOLIC CHF WITH REDUCED LEFT VENTRICULAR FUNCTION, NYHA CLASS 2 (HCC): ICD-10-CM

## 2018-08-09 RX ORDER — CARVEDILOL 6.25 MG/1
6.25 TABLET ORAL 2 TIMES DAILY WITH MEALS
Qty: 180 TAB | Refills: 3 | Status: SHIPPED | OUTPATIENT
Start: 2018-08-09 | End: 2019-08-10 | Stop reason: SDUPTHER

## 2018-08-09 RX ORDER — ATORVASTATIN CALCIUM 40 MG/1
40 TABLET, FILM COATED ORAL
Qty: 90 TAB | Refills: 3 | Status: SHIPPED | OUTPATIENT
Start: 2018-08-09 | End: 2019-08-10 | Stop reason: SDUPTHER

## 2018-11-14 ENCOUNTER — OFFICE VISIT (OUTPATIENT)
Dept: MEDICAL GROUP | Facility: MEDICAL CENTER | Age: 68
End: 2018-11-14
Payer: COMMERCIAL

## 2018-11-14 VITALS
SYSTOLIC BLOOD PRESSURE: 152 MMHG | DIASTOLIC BLOOD PRESSURE: 80 MMHG | RESPIRATION RATE: 16 BRPM | BODY MASS INDEX: 27.7 KG/M2 | HEART RATE: 85 BPM | OXYGEN SATURATION: 94 % | TEMPERATURE: 98.7 F | WEIGHT: 187 LBS | HEIGHT: 69 IN

## 2018-11-14 DIAGNOSIS — E78.2 MIXED HYPERLIPIDEMIA: ICD-10-CM

## 2018-11-14 DIAGNOSIS — Z95.1 S/P CABG X 2: ICD-10-CM

## 2018-11-14 DIAGNOSIS — J44.1 COPD WITH ACUTE EXACERBATION (HCC): ICD-10-CM

## 2018-11-14 DIAGNOSIS — I10 ESSENTIAL HYPERTENSION: ICD-10-CM

## 2018-11-14 PROCEDURE — 99214 OFFICE O/P EST MOD 30 MIN: CPT | Performed by: FAMILY MEDICINE

## 2018-11-14 RX ORDER — AMOXICILLIN AND CLAVULANATE POTASSIUM 875; 125 MG/1; MG/1
1 TABLET, FILM COATED ORAL 2 TIMES DAILY
Qty: 14 TAB | Refills: 0 | Status: SHIPPED | OUTPATIENT
Start: 2018-11-14 | End: 2018-11-21

## 2018-11-14 RX ORDER — PREDNISONE 20 MG/1
TABLET ORAL
Qty: 30 TAB | Refills: 0 | Status: SHIPPED | OUTPATIENT
Start: 2018-11-14 | End: 2018-12-11 | Stop reason: SDUPTHER

## 2018-11-14 NOTE — PROGRESS NOTES
CC: COPD(cough and shortness of breath), CAD, hypertension, hyperlipidemia.    HPI:   Evans presents today to discuss the following    COPD with acute exacerbation (HCC)  Patient has been having cough with thick yellow phlegm for the past 3 days associated with shortness of breath.  Denies any fever, and chest pain.  Patient has been on albuterol as needed and Breo Ellipta once daily.  His oxygen saturation has been within normal limits(more than 90%)    Coronary artery disease, S/P CABG x 2  Denies any new chest pain.  Patient has been on atorvastatin, carvedilol, and Plavix.  He follows up with cardiology Dr. Jacob in a regular basis    Essential hypertension  His blood pressure today is slightly high, however he denies any headache or chest pain.   However patient stating that he has been checking his blood pressure and it has been within normal limits.  He has been on carvedilol 6.25 mg twice a day.  Patient was on ACE inhibitors in the past, was stopped.    Mixed hyperlipidemia  He has been tolerating the statin. Denies muscle pain LFTs has been normal, he has been on atorvastatin 40 mg daily.          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 14 Tab 0   • predniSONE (DELTASONE) 20 MG Tab Take one tablet daily for 5 days. 30 Tab 0   • carvedilol  "(COREG) 6.25 MG Tab Take 1 Tab by mouth 2 times a day, with meals. 180 Tab 3   • atorvastatin (LIPITOR) 40 MG Tab Take 1 Tab by mouth every bedtime. 90 Tab 3   • varenicline (CHANTIX CONTINUING MONTH LISSETT) 1 MG tablet Take 1 Tab by mouth 2 times a day. 60 Tab 3   • BREO ELLIPTA 100-25 MCG/INH AEROSOL POWDER, BREATH ACTIVATED INHALE 1 PUFF BY MOUTH EVERY BEDTIME (MAX 30 DAYS PER INSURANCE) 1 Each 3   • albuterol 108 (90 BASE) MCG/ACT Aero Soln inhalation aerosol Inhale 2 Puffs by mouth every four hours as needed. 8.5 g    • aspirin (ASA) 81 MG CHEW chewable tablet Take 1 Tab by mouth every day. 100 Tab 11   • SPIRIVA HANDIHALER 18 MCG Cap INHALE 1 CAP BY MOUTH EVERY DAY. 90 Cap 3   • predniSONE (DELTASONE) 10 MG Tab Take 20 mg bid for 2 days, 10 mg bid for 2 days, 10 mg daily for 2 days, 5 mg daily for 2 days 15 Tab 0   • clopidogrel (PLAVIX) 75 MG Tab Take 1 Tab by mouth every day. 90 Tab 3   • docusate sodium 100 MG Cap Take 100 mg by mouth every morning. 60 Cap    • hydrocodone-acetaminophen (NORCO) 5-325 MG Tab per tablet Take 1-2 Tabs by mouth every 6 hours as needed. 20 Tab 0   • ipratropium-albuterol (DUONEB) 0.5-2.5 (3) MG/3ML nebulizer solution 3 mL by Nebulization route every four hours as needed for Shortness of Breath.       No current facility-administered medications for this visit.          Allergies as of 11/14/2018   • (No Known Allergies)        ROS: Denies any chest pain, Shortness of breath, Changes bowel or bladder, Lower extremity edema.    Physical Exam:  /80 (BP Location: Left arm)   Pulse 85   Temp 37.1 °C (98.7 °F)   Resp 16   Ht 1.753 m (5' 9\")   Wt 84.8 kg (187 lb)   SpO2 94%   BMI 27.62 kg/m²   Gen.: Well-developed, well-nourished, no apparent distress,pleasant and cooperative with the examination  Skin:  Warm and dry with good turgor. No rashes or suspicious lesions in visible areas  HEENT:Sinuses nontender with palpation, TMs clear, nares patent with pink mucosa and clear " rhinorrhea,no septal deviation ,polyps or lesions. lips without lesions, oropharynx clear.  Neck: Trachea midline,no masses or adenopathy. No JVD.  Cor: Regular rate and rhythm without murmur, gallop or rub.  Lungs: Respirations unlabored.decreased breath sounds bilaterally.  Scattered wheezes, no rhonchi.  Extremities: No cyanosis, clubbing or edema.      Assessment and Plan.   68 y.o. male     1. COPD with acute exacerbation (HCC)  Start patient on oral antibiotics and steroids.  Advised to continue on albuterol inhaler every 6 hours and regular basis for 2-3 days then every 6 hours as needed.  Continue on Breo Ellipta once daily.    - amoxicillin-clavulanate (AUGMENTIN) 875-125 MG Tab; Take 1 Tab by mouth 2 times a day for 7 days.  Dispense: 14 Tab; Refill: 0  - predniSONE (DELTASONE) 20 MG Tab; Take one tablet daily for 5 days.  Dispense: 30 Tab; Refill: 0    2. S/P CABG x 2  Stable.  Asymptomatic.  Continue on statin, beta-blocker, and Plavix.  Continue follow-up with cardiology.    3. Essential hypertension  Slightly high.  However patient stating that he has been checking his blood pressure and it has been within normal limits.  For now continue on current medication(carvedilol 6.25 mg twice a day).  Patient was on ACE inhibitors in the past, was stopped.  Consider adding ACE inhibitors if his blood pressure to continues to be high.    4. Mixed hyperlipidemia  He has been tolerating the statin. Denies muscle pain LFTs has been normal  Continue on atorvastatin 40 mg daily.

## 2018-11-19 DIAGNOSIS — J44.9 CHRONIC OBSTRUCTIVE PULMONARY DISEASE, UNSPECIFIED COPD TYPE (HCC): ICD-10-CM

## 2018-12-11 DIAGNOSIS — J44.1 COPD WITH ACUTE EXACERBATION (HCC): ICD-10-CM

## 2018-12-11 RX ORDER — PREDNISONE 20 MG/1
TABLET ORAL
Qty: 15 TAB | Refills: 0 | Status: SHIPPED | OUTPATIENT
Start: 2018-12-11 | End: 2019-06-12

## 2019-05-04 DIAGNOSIS — J44.9 CHRONIC OBSTRUCTIVE PULMONARY DISEASE, UNSPECIFIED COPD TYPE (HCC): ICD-10-CM

## 2019-05-08 ENCOUNTER — OFFICE VISIT (OUTPATIENT)
Dept: CARDIOLOGY | Facility: MEDICAL CENTER | Age: 69
End: 2019-05-08
Payer: COMMERCIAL

## 2019-05-08 VITALS
SYSTOLIC BLOOD PRESSURE: 162 MMHG | DIASTOLIC BLOOD PRESSURE: 70 MMHG | OXYGEN SATURATION: 96 % | BODY MASS INDEX: 25.62 KG/M2 | WEIGHT: 173 LBS | HEART RATE: 82 BPM | HEIGHT: 69 IN

## 2019-05-08 DIAGNOSIS — Z95.1 S/P CABG X 2: ICD-10-CM

## 2019-05-08 DIAGNOSIS — I10 ESSENTIAL HYPERTENSION: ICD-10-CM

## 2019-05-08 DIAGNOSIS — I25.10 CORONARY ARTERY DISEASE INVOLVING NATIVE CORONARY ARTERY OF NATIVE HEART WITHOUT ANGINA PECTORIS: ICD-10-CM

## 2019-05-08 PROCEDURE — 99214 OFFICE O/P EST MOD 30 MIN: CPT | Performed by: INTERNAL MEDICINE

## 2019-05-08 RX ORDER — LISINOPRIL 10 MG/1
10 TABLET ORAL DAILY
Qty: 30 TAB | Refills: 6 | Status: SHIPPED | OUTPATIENT
Start: 2019-05-08 | End: 2019-12-11 | Stop reason: SDUPTHER

## 2019-05-08 ASSESSMENT — ENCOUNTER SYMPTOMS
BACK PAIN: 0
WEAKNESS: 0
VOMITING: 0
SHORTNESS OF BREATH: 1
MUSCULOSKELETAL NEGATIVE: 1
NEUROLOGICAL NEGATIVE: 1
PALPITATIONS: 0
BRUISES/BLEEDS EASILY: 0
NAUSEA: 0
EYES NEGATIVE: 1
CONSTITUTIONAL NEGATIVE: 1
DEPRESSION: 0

## 2019-05-08 NOTE — PROGRESS NOTES
"Chief Complaint   Patient presents with   • CHF (Acute)     Follow up   • Coronary Artery Disease       Subjective:   Evans Díaz is a 69 y.o. male who presents today for follow-up of recent CABG, hypertension and hyperlipidemia.  He underwent bypass graft surgery in June, 2017.  He has had no angina.  He has been off cigarettes for about a year.  He does still note some dyspnea.  He has a history of sternal nonunion and still feels a \"pop\" at times but this is getting better.  He is due for laboratory testing.    Past Medical History:   Diagnosis Date   • Breath shortness    • Bronchitis    • Cataract     surgery bilat   • Chronic airway obstruction, not elsewhere classified    • Congestive heart failure (HCC)    • Coughing blood    • Emphysema of lung (HCC)    • Hypertension    • Pulmonary hypertension (HCC)    • Renal disorder      Past Surgical History:   Procedure Laterality Date   • MULTIPLE CORONARY ARTERY BYPASS ENDO VEIN HARVEST  6/1/2017    Procedure: MULTIPLE CORONARY ARTERY BYPASS x2 ENDO VEIN HARVEST AND PREVENA DRESSING;  Surgeon: Dionisio Jones M.D.;  Location: SURGERY [facility];  Service:    • HELLEN  6/1/2017    Procedure: HELLEN - INTRA-OPERATIVE;  Surgeon: Dionisio Jones M.D.;  Location: SURGERY [facility];  Service:    • OTHER      cataract surgery IOL bilat   • ZZZ CARDIAC CATH  3/1/15    CAD but not CABG candidate.  99% RCA.     Family History   Problem Relation Age of Onset   • Heart Disease Brother 44        valve replacement     Social History     Social History   • Marital status:      Spouse name: N/A   • Number of children: N/A   • Years of education: N/A     Occupational History   • Not on file.     Social History Main Topics   • Smoking status: Former Smoker     Packs/day: 0.50     Years: 60.00     Types: Cigarettes     Quit date: 7/1/2018   • Smokeless tobacco: Never Used   • Alcohol use No   • Drug use: No   • Sexual activity: Not Currently     Other Topics " Concern   • Not on file     Social History Narrative   • No narrative on file     No Known Allergies  Outpatient Encounter Prescriptions as of 5/8/2019   Medication Sig Dispense Refill   • lisinopril (PRINIVIL) 10 MG Tab Take 1 Tab by mouth every day. 30 Tab 6   • BREO ELLIPTA 100-25 MCG/INH AEROSOL POWDER, BREATH ACTIVATED INHALE 1 PUFF BY MOUTH EVERY BEDTIME (MAX 30 DAYS PER INSURANCE) 60 Each 3   • carvedilol (COREG) 6.25 MG Tab Take 1 Tab by mouth 2 times a day, with meals. 180 Tab 3   • atorvastatin (LIPITOR) 40 MG Tab Take 1 Tab by mouth every bedtime. 90 Tab 3   • SPIRIVA HANDIHALER 18 MCG Cap INHALE 1 CAP BY MOUTH EVERY DAY. 90 Cap 3   • predniSONE (DELTASONE) 20 MG Tab Take 20 mg twice a day for 2 days, 10 mg twice a day for 2 days, 10 mg daily for 2 days, 5 mg daily for 2 days. (Patient not taking: Reported on 5/8/2019) 15 Tab 0   • varenicline (CHANTIX CONTINUING MONTH LISSETT) 1 MG tablet Take 1 Tab by mouth 2 times a day. (Patient not taking: Reported on 5/8/2019) 60 Tab 3   • predniSONE (DELTASONE) 10 MG Tab Take 20 mg bid for 2 days, 10 mg bid for 2 days, 10 mg daily for 2 days, 5 mg daily for 2 days (Patient not taking: Reported on 5/8/2019) 15 Tab 0   • clopidogrel (PLAVIX) 75 MG Tab Take 1 Tab by mouth every day. (Patient not taking: Reported on 5/8/2019) 90 Tab 3   • albuterol 108 (90 BASE) MCG/ACT Aero Soln inhalation aerosol Inhale 2 Puffs by mouth every four hours as needed. 8.5 g    • docusate sodium 100 MG Cap Take 100 mg by mouth every morning. (Patient not taking: Reported on 5/8/2019) 60 Cap    • hydrocodone-acetaminophen (NORCO) 5-325 MG Tab per tablet Take 1-2 Tabs by mouth every 6 hours as needed. (Patient not taking: Reported on 5/8/2019) 20 Tab 0   • ipratropium-albuterol (DUONEB) 0.5-2.5 (3) MG/3ML nebulizer solution 3 mL by Nebulization route every four hours as needed for Shortness of Breath. (Patient not taking: Reported on 5/8/2019)     • aspirin (ASA) 81 MG CHEW chewable tablet Take  "1 Tab by mouth every day. 100 Tab 11     No facility-administered encounter medications on file as of 5/8/2019.      Review of Systems   Constitutional: Negative.  Negative for malaise/fatigue.   Eyes: Negative.    Respiratory: Positive for shortness of breath.    Cardiovascular: Negative for chest pain, palpitations and leg swelling.   Gastrointestinal: Negative for nausea and vomiting.   Musculoskeletal: Negative.  Negative for back pain.        Feels chest \"popping\" with deep breathing   Neurological: Negative.  Negative for weakness.   Endo/Heme/Allergies: Does not bruise/bleed easily.   Psychiatric/Behavioral: Negative for depression.        Objective:   BP (!) 162/70 (BP Location: Left arm, Patient Position: Sitting, BP Cuff Size: Adult)   Pulse 82   Ht 1.753 m (5' 9\")   Wt 78.5 kg (173 lb)   SpO2 96%   BMI 25.55 kg/m²     Physical Exam   Constitutional: He is oriented to person, place, and time. No distress.   HENT:   Head: Normocephalic and atraumatic.   Eyes: Pupils are equal, round, and reactive to light. Conjunctivae are normal.   Neck: No JVD present.   Cardiovascular: Normal rate and regular rhythm.    No murmur heard.  Pulmonary/Chest: No respiratory distress. He has no wheezes.   Decreased breath sounds compatible with COPD     Abdominal: Soft. Bowel sounds are normal. He exhibits no distension.   Musculoskeletal: He exhibits no edema.   Neurological: He is alert and oriented to person, place, and time.   Skin: Skin is warm and dry.   Psychiatric: He has a normal mood and affect.       Assessment:     1. Coronary artery disease involving native coronary artery of native heart without angina pectoris     2. S/P CABG x 2  Lipid Profile    Comp Metabolic Panel   3. Essential hypertension  Lipid Profile    Comp Metabolic Panel       Medical Decision Making:  Today's Assessment / Status / Plan:   Hypertension: By my reading his blood pressure is 162 systolic bilaterally.  We will add lisinopril 10 mg " a day into his regimen.  Would not recommend increasing carvedilol because of his lung disease.    COPD: Patient has stigmata of COPD by exam.  He is forced to quit smoking.    Status post CABG: He is now 2 years post surgery.  We will check lipid profile.    Return one month for follow-up of blood pressure with addition of lisinopril and also to lower his cholesterol results.  He still has evidence of sternal nonunion but less obvious than it was immediately after surgery.  He is not interested in any type of intervention for this.

## 2019-06-06 ENCOUNTER — HOSPITAL ENCOUNTER (OUTPATIENT)
Dept: LAB | Facility: MEDICAL CENTER | Age: 69
End: 2019-06-06
Attending: INTERNAL MEDICINE
Payer: COMMERCIAL

## 2019-06-06 DIAGNOSIS — I10 ESSENTIAL HYPERTENSION: ICD-10-CM

## 2019-06-06 DIAGNOSIS — Z95.1 S/P CABG X 2: ICD-10-CM

## 2019-06-06 LAB
ALBUMIN SERPL BCP-MCNC: 4.2 G/DL (ref 3.2–4.9)
ALBUMIN/GLOB SERPL: 1.6 G/DL
ALP SERPL-CCNC: 104 U/L (ref 30–99)
ALT SERPL-CCNC: 18 U/L (ref 2–50)
ANION GAP SERPL CALC-SCNC: 8 MMOL/L (ref 0–11.9)
AST SERPL-CCNC: 12 U/L (ref 12–45)
BILIRUB SERPL-MCNC: 0.8 MG/DL (ref 0.1–1.5)
BUN SERPL-MCNC: 27 MG/DL (ref 8–22)
CALCIUM SERPL-MCNC: 9.4 MG/DL (ref 8.5–10.5)
CHLORIDE SERPL-SCNC: 100 MMOL/L (ref 96–112)
CHOLEST SERPL-MCNC: 126 MG/DL (ref 100–199)
CO2 SERPL-SCNC: 25 MMOL/L (ref 20–33)
CREAT SERPL-MCNC: 1.4 MG/DL (ref 0.5–1.4)
FASTING STATUS PATIENT QL REPORTED: NORMAL
GLOBULIN SER CALC-MCNC: 2.6 G/DL (ref 1.9–3.5)
GLUCOSE SERPL-MCNC: 372 MG/DL (ref 65–99)
HDLC SERPL-MCNC: 34 MG/DL
LDLC SERPL CALC-MCNC: 68 MG/DL
POTASSIUM SERPL-SCNC: 4.6 MMOL/L (ref 3.6–5.5)
PROT SERPL-MCNC: 6.8 G/DL (ref 6–8.2)
SODIUM SERPL-SCNC: 133 MMOL/L (ref 135–145)
TRIGL SERPL-MCNC: 118 MG/DL (ref 0–149)

## 2019-06-06 PROCEDURE — 36415 COLL VENOUS BLD VENIPUNCTURE: CPT

## 2019-06-06 PROCEDURE — 80053 COMPREHEN METABOLIC PANEL: CPT

## 2019-06-06 PROCEDURE — 80061 LIPID PANEL: CPT

## 2019-06-12 ENCOUNTER — OFFICE VISIT (OUTPATIENT)
Dept: CARDIOLOGY | Facility: MEDICAL CENTER | Age: 69
End: 2019-06-12
Payer: COMMERCIAL

## 2019-06-12 VITALS
HEART RATE: 84 BPM | WEIGHT: 173 LBS | BODY MASS INDEX: 25.62 KG/M2 | HEIGHT: 69 IN | OXYGEN SATURATION: 95 % | DIASTOLIC BLOOD PRESSURE: 80 MMHG | SYSTOLIC BLOOD PRESSURE: 162 MMHG

## 2019-06-12 DIAGNOSIS — Z95.1 S/P CABG X 2: ICD-10-CM

## 2019-06-12 DIAGNOSIS — I10 ESSENTIAL HYPERTENSION: ICD-10-CM

## 2019-06-12 DIAGNOSIS — N28.9 RENAL INSUFFICIENCY: ICD-10-CM

## 2019-06-12 DIAGNOSIS — I25.10 CORONARY ARTERY DISEASE INVOLVING NATIVE CORONARY ARTERY OF NATIVE HEART WITHOUT ANGINA PECTORIS: ICD-10-CM

## 2019-06-12 DIAGNOSIS — E78.2 MIXED HYPERLIPIDEMIA: ICD-10-CM

## 2019-06-12 DIAGNOSIS — R73.01 IMPAIRED FASTING GLUCOSE: ICD-10-CM

## 2019-06-12 PROCEDURE — 99214 OFFICE O/P EST MOD 30 MIN: CPT | Performed by: NURSE PRACTITIONER

## 2019-06-12 ASSESSMENT — ENCOUNTER SYMPTOMS
WEIGHT LOSS: 0
DIZZINESS: 0
SHORTNESS OF BREATH: 1
WEAKNESS: 0
PND: 0
ORTHOPNEA: 0
PALPITATIONS: 0
CLAUDICATION: 0

## 2019-06-12 NOTE — PROGRESS NOTES
Chief Complaint   Patient presents with   • Coronary Artery Disease       Subjective:   Evans Díaz is a 69 y.o. male patient of Dr. Carmelo Jacob who presents today for follow-up regarding his coronary artery disease and hypertension.    Patient was last seen by Dr. Jacob on 5/8/2019, his systolic blood pressure was raised in the 160s and lisinopril 10 mg daily was started.    Past medical history significant for CAD S/P CABG X 2 in 2017 (LIMA LAD and RVSG RCA), hypertension, COPD, impaired fasting glucose, renal insufficiency and hyperlipidemia.    Today patient states that he is not sure how his blood pressure has been running at home as he does not have access to a blood pressure cuff.  Only complaint today is shortness of breath which is chronic for him due to his history of COPD.  Denies chest pain, palpitations, lower extremity edema or dizziness/syncope.    Past Medical History:   Diagnosis Date   • Breath shortness    • Bronchitis    • Cataract     surgery bilat   • Chronic airway obstruction, not elsewhere classified    • Congestive heart failure (HCC)    • Coughing blood    • Emphysema of lung (HCC)    • Hypertension    • Pulmonary hypertension (HCC)    • Renal disorder      Past Surgical History:   Procedure Laterality Date   • MULTIPLE CORONARY ARTERY BYPASS ENDO VEIN HARVEST  6/1/2017    Procedure: MULTIPLE CORONARY ARTERY BYPASS x2 ENDO VEIN HARVEST AND PREVENA DRESSING;  Surgeon: Dionisio Jones M.D.;  Location: SURGERY [facility];  Service:    • HELLEN  6/1/2017    Procedure: HELLEN - INTRA-OPERATIVE;  Surgeon: Dionisio Jones M.D.;  Location: SURGERY [facility];  Service:    • OTHER      cataract surgery IOL bilat   • ZZZ CARDIAC CATH  3/1/15    CAD but not CABG candidate.  99% RCA.     Family History   Problem Relation Age of Onset   • Heart Disease Brother 44        valve replacement     Social History     Social History   • Marital status:      Spouse name: N/A   •  Number of children: N/A   • Years of education: N/A     Occupational History   • Not on file.     Social History Main Topics   • Smoking status: Former Smoker     Packs/day: 0.50     Years: 60.00     Types: Cigarettes     Quit date: 7/1/2018   • Smokeless tobacco: Never Used   • Alcohol use No   • Drug use: No   • Sexual activity: Not Currently     Other Topics Concern   • Not on file     Social History Narrative   • No narrative on file     No Known Allergies  Outpatient Encounter Prescriptions as of 6/12/2019   Medication Sig Dispense Refill   • lisinopril (PRINIVIL) 10 MG Tab Take 1 Tab by mouth every day. 30 Tab 6   • BREO ELLIPTA 100-25 MCG/INH AEROSOL POWDER, BREATH ACTIVATED INHALE 1 PUFF BY MOUTH EVERY BEDTIME (MAX 30 DAYS PER INSURANCE) 60 Each 3   • carvedilol (COREG) 6.25 MG Tab Take 1 Tab by mouth 2 times a day, with meals. 180 Tab 3   • atorvastatin (LIPITOR) 40 MG Tab Take 1 Tab by mouth every bedtime. 90 Tab 3   • SPIRIVA HANDIHALER 18 MCG Cap INHALE 1 CAP BY MOUTH EVERY DAY. 90 Cap 3   • albuterol 108 (90 BASE) MCG/ACT Aero Soln inhalation aerosol Inhale 2 Puffs by mouth every four hours as needed. 8.5 g    • aspirin (ASA) 81 MG CHEW chewable tablet Take 1 Tab by mouth every day. 100 Tab 11   • [DISCONTINUED] predniSONE (DELTASONE) 20 MG Tab Take 20 mg twice a day for 2 days, 10 mg twice a day for 2 days, 10 mg daily for 2 days, 5 mg daily for 2 days. (Patient not taking: Reported on 5/8/2019) 15 Tab 0   • [DISCONTINUED] varenicline (CHANTIX CONTINUING MONTH LISSETT) 1 MG tablet Take 1 Tab by mouth 2 times a day. (Patient not taking: Reported on 5/8/2019) 60 Tab 3   • [DISCONTINUED] predniSONE (DELTASONE) 10 MG Tab Take 20 mg bid for 2 days, 10 mg bid for 2 days, 10 mg daily for 2 days, 5 mg daily for 2 days (Patient not taking: Reported on 5/8/2019) 15 Tab 0   • [DISCONTINUED] clopidogrel (PLAVIX) 75 MG Tab Take 1 Tab by mouth every day. (Patient not taking: Reported on 5/8/2019) 90 Tab 3   •  "[DISCONTINUED] docusate sodium 100 MG Cap Take 100 mg by mouth every morning. (Patient not taking: Reported on 5/8/2019) 60 Cap    • [DISCONTINUED] hydrocodone-acetaminophen (NORCO) 5-325 MG Tab per tablet Take 1-2 Tabs by mouth every 6 hours as needed. (Patient not taking: Reported on 5/8/2019) 20 Tab 0   • [DISCONTINUED] ipratropium-albuterol (DUONEB) 0.5-2.5 (3) MG/3ML nebulizer solution 3 mL by Nebulization route every four hours as needed for Shortness of Breath. (Patient not taking: Reported on 5/8/2019)       No facility-administered encounter medications on file as of 6/12/2019.      Review of Systems   Constitutional: Negative for malaise/fatigue and weight loss.   Respiratory: Positive for shortness of breath (Chronic).    Cardiovascular: Negative for chest pain, palpitations, orthopnea, claudication, leg swelling and PND.   Neurological: Negative for dizziness and weakness.   All other systems reviewed and are negative.       Objective:   BP (!) 162/80 (BP Location: Left arm, Patient Position: Sitting, BP Cuff Size: Adult)   Pulse 84   Ht 1.753 m (5' 9\")   Wt 78.5 kg (173 lb)   SpO2 95%   BMI 25.55 kg/m²     Physical Exam   Constitutional: He is oriented to person, place, and time. He appears well-developed and well-nourished. No distress.   HENT:   Head: Normocephalic.   Eyes: EOM are normal.   Neck: No JVD present.   Cardiovascular: Normal rate and regular rhythm.    Pulmonary/Chest: Effort normal. He has decreased breath sounds in the right lower field and the left lower field.   Abdominal: Soft. There is no tenderness.   Musculoskeletal: He exhibits no edema.   Neurological: He is alert and oriented to person, place, and time.   Skin: Skin is warm and dry.   Psychiatric: He has a normal mood and affect. His behavior is normal.        6/6/19 (Reviewed):  Cholesterol,Tot 126  100 - 199 mg/dL Final   Triglycerides 118  0 - 149 mg/dL Final   HDL 34   >=40 mg/dL Final   LDL 68  <100 mg/dL Final     "     Sodium 133   135 - 145 mmol/L Final   Potassium 4.6  3.6 - 5.5 mmol/L Final   Chloride 100  96 - 112 mmol/L Final   Co2 25  20 - 33 mmol/L Final   Anion Gap 8.0  0.0 - 11.9 Final   Glucose 372   65 - 99 mg/dL Final   Bun 27   8 - 22 mg/dL Final   Creatinine 1.40  0.50 - 1.40 mg/dL Final   Calcium 9.4  8.5 - 10.5 mg/dL Final   AST(SGOT) 12  12 - 45 U/L Final   ALT(SGPT) 18  2 - 50 U/L Final   Alkaline Phosphatase 104   30 - 99 U/L Final   Total Bilirubin 0.8  0.1 - 1.5 mg/dL Final   Albumin 4.2  3.2 - 4.9 g/dL Final   Total Protein 6.8  6.0 - 8.2 g/dL Final   Globulin 2.6  1.9 - 3.5 g/dL Final   A-G Ratio 1.6  g/dL Final     GFR If African American >60  >60 mL/min/1.73 m 2 Final   GFR If Non African American 50   >60 mL/min/1.73 m 2 Final     TTE 11/9/17: (Reviewed)   Prior echo on 10-  Basilar to mid Inferior wall akinesis.  Left ventricular ejection fraction is visually estimated to be 60%.  Normal left ventricular wall thickness.  The right atrium is normal in size.  Structurally normal mitral valve without significant stenosis or regurgitation.  Moderate mitral regurgitation.  Aortic sclerosis without stenosis.  Trace aortic insufficiency.  Estimated right ventricular systolic pressure  is 35 mmHg.  Trace tricuspid regurgitation.    Left Ventricle  Normal left ventricular chamber size. Normal left ventricular wall thickness. Left ventricular ejection fraction is visually estimated to be 60%. Basilar to mid Inferior wall akinesis.  Grade I diastolic dysfunction.      Assessment:     1. Renal insufficiency  Basic Metabolic Panel   2. Coronary artery disease involving native coronary artery of native heart without angina pectoris     3. S/P CABG x 2     4. Essential hypertension     5. Mixed hyperlipidemia     6. Impaired fasting glucose         Medical Decision Making:  Today's Assessment / Status / Plan:     CAD:  -S/P CABG X 2 (LIMA LAD and RVSG RCA) 2017.  -TTE on 11/9/17 showed basilar to mid  inferior wall akinesis, LVEF 60%.  -Denies Chest pain.  -Continue lisinopril 10 mg daily, Coreg 6.25 mg twice daily, Plavix 75 mg daily, ASA 81 mg daily and statin therapy.    HTN:  -Elevated in office today, however patient has not taken his medications this morning, unsure what home blood pressure has been running.  -Continue ACE and BB as above.  -Asked patient to please obtain a blood pressure cuff and take his blood pressure approximately 4-6 hours after his a.m. dose and log, we will review this together in 1 month.    HLD:  -LDL on 6/6/19 was 68, at goal of <70.  -Continue Atorvastatin 40 mg daily.     Impaired Fasting Glucose:  -Glucose on 6/6/19 was 372.  -A1c in May 2017 was 6.4.  -Discussed with patient that it is very likely he has diabetes and the importance of follow-up and treatment for this condition.  Encouraged patient to follow up with PCP as soon as possible.    Renal Insufficiency:  -Cr 1.4, GFR 50.  -Will monitor closely.   -Repeat BMP in one month.    Patient will follow-up with myself in 1 month to review home blood pressure log.  Encouraged patient to contact office should any questions or concerns arise in the meantime.  Patient understands agrees plan of care.

## 2019-07-11 NOTE — PROGRESS NOTES
Cardiovascular Surgery Progress Note    Name: Evans Díaz  MRN: [id number]  : 1950  Admit Date: 2017  5:04 AM  Procedure:  Procedure(s) and Anesthesia Type:     * MULTIPLE CORONARY ARTERY BYPASS x2 ENDO VEIN HARVEST AND PREVENA DRESSING - General     * HELLEN - INTRA-OPERATIVE - General  2 Day Post-Op    Vitals:  Patient Vitals for the past 8 hrs:   Temp SpO2 O2 Delivery O2 (LPM) Pulse Heart Rate (Monitored) Resp BP NIBP   17 0827 - 92 % - 3.5 (!) 107 (!) 108 20 - -   17 0800 36.6 °C (97.9 °F) 92 % Silicone Nasal Cannula 3.5 (!) 106 (!) 107 18 - 144/77 mmHg   17 0700 - - - - (!) 109 (!) 109 20 - -   17 0600 - - - - (!) 102 (!) 103 14 - -   17 0500 - - - - (!) 107 (!) 107 17 - -   17 0400 37.1 °C (98.8 °F) 93 % Silicone Nasal Cannula 5 (!) 106 (!) 107 14 149/74 mmHg 149/74 mmHg     Temp (24hrs), Av.9 °C (98.4 °F), Min:36.2 °C (97.2 °F), Max:37.6 °C (99.7 °F)      Respiratory:    Respiration: 20, Pulse Oximetry: 92 %, O2 Daily Delivery Respiratory : Silicone Nasal Cannula     Chest Tube Drains:     Mediastinal Chest Tube 1: 20 ml    Fluids:    Intake/Output Summary (Last 24 hours) at 17 0852  Last data filed at 17 0800   Gross per 24 hour   Intake   1206 ml   Output   2160 ml   Net   -954 ml     Admit weight: Weight: 84.5 kg (186 lb 4.6 oz)  Current weight: Weight: 86.9 kg (191 lb 9.3 oz) (17 0500)    Labs:  Recent Labs      17   1316   17   1218  17   0001  17   0400   WBC  10.4   --    --   14.4*  16.9*   RBC  4.80   --    --   3.65*  3.64*   HEMOGLOBIN  14.6   --    --   11.2*  11.0*   HEMATOCRIT  43.2   --    --   33.6*  32.3*   MCV  90.0   --    --   91.0  88.7   MCH  30.4   --    --   29.9  30.2   MCHC  33.8   --    --   32.8*  34.1   RDW  45.3   --    --   46.2  44.8   PLATELETCT  286   < >  225  210  201   MPV  9.8   --    --   10.0  10.1    < > = values in this interval not displayed.     Recent Labs      17    I spoke with the adjustor for [employer]  This is an AUTO claim  They documentation that they received was incorrect  There is no need to change the order at this time  I am going to resubmit the attending provider treatment plan form along with the proper clinical notes  Please cancel the order that was placed for the Chest MRI  Thanks  1316   NEUTSPOLYS  69.10   LYMPHOCYTES  18.20*   MONOCYTES  8.80   EOSINOPHILS  2.70   BASOPHILS  0.50     Recent Labs      05/31/17   1316   06/02/17   0001  06/02/17   0458  06/03/17   0400   SODIUM  136   --   136   --   130*   POTASSIUM  4.4   < >  4.2  4.2  4.1   CHLORIDE  104   --   106   --   97   CO2  25   --   24   --   26   GLUCOSE  120*   --   106*   --   163*   BUN  31*   --   32*   --   36*   CREATININE  1.32   --   1.37   --   1.32   CALCIUM  10.1   --   8.0*   --   9.2    < > = values in this interval not displayed.     Recent Labs      05/31/17   1316  06/01/17   1218   APTT  31.5  35.6   INR  0.96  1.31*       Medications:  • furosemide  20 mg     • potassium chloride (KCl)  10 mEq     • carvedilol  6.25 mg     • carvedilol  3.125 mg     • enalapril  5 mg     • insulin NPH  8 Units     • insulin lispro  6 Units     • insulin lispro  0-15 Units     • aspirin  81 mg     • docusate sodium  100 mg      And   • senna-docusate  1 Tab     • enoxaparin  40 mg     • magnesium sulfate  1 g Stopped (06/02/17 1327)   • atorvastatin  40 mg     • clopidogrel  75 mg         Exam:   Review of Systems   Constitutional: Negative.    HENT: Negative.    Eyes: Negative.    Respiratory: Negative.    Cardiovascular: Negative.    Gastrointestinal: Negative.    Genitourinary: Negative.    Musculoskeletal: Negative.    Skin: Negative.    Neurological: Negative.    Psychiatric/Behavioral: Negative.        Physical Exam   Constitutional: He is oriented to person, place, and time. He appears well-developed and well-nourished.   HENT:   Head: Normocephalic and atraumatic.   Eyes: Pupils are equal, round, and reactive to light.   Neck: Normal range of motion. Neck supple.   Cardiovascular: Normal rate and regular rhythm.    Pulmonary/Chest: Effort normal.   Decreased at bases.    Abdominal: Soft. Bowel sounds are normal.   Musculoskeletal: Normal range of motion.   Neurological: He is alert and oriented to person, place, and  time.   Skin: Skin is warm and dry.   Wounds CDI   Psychiatric: He has a normal mood and affect.       Quality Measures:   EKG reviewed, Medications reviewed, Radiology images reviewed and Labs reviewed  Nassar catheter: No Nassar  Central line in place: Need for access    DVT Prophylaxis: Enoxaparin (Lovenox)  DVT prophylaxis - mechanical: SCDs  Ulcer prophylaxis: Yes    Assessed for rehab: Patient returned to prior level of function, rehabilitation not indicated at this time      Assessment/Plan:  Extubated, HDS, no drips, chest tube output minimal and negative for air leak.  Neuro grossly intact.  Plan: Transition to tele status.  DC chest tubes.  DC nassar.  POD 2 HDS/HTN, NSR/ST, +3.5 L, wts up, 3 L NC.  Renal function stable. Surgical incisions CDI.  PLAN: Diurese. Increase BB, add ACE (cardiomyopathy). IS/AMB. 2 view CXR in am.     Active Hospital Problems    Diagnosis   • CAD (coronary artery disease) [I25.10]

## 2019-07-12 ENCOUNTER — HOSPITAL ENCOUNTER (OUTPATIENT)
Dept: LAB | Facility: MEDICAL CENTER | Age: 69
End: 2019-07-12
Attending: NURSE PRACTITIONER
Payer: COMMERCIAL

## 2019-07-12 DIAGNOSIS — N28.9 RENAL INSUFFICIENCY: ICD-10-CM

## 2019-07-12 LAB
ANION GAP SERPL CALC-SCNC: 10 MMOL/L (ref 0–11.9)
BUN SERPL-MCNC: 30 MG/DL (ref 8–22)
CALCIUM SERPL-MCNC: 9.1 MG/DL (ref 8.5–10.5)
CHLORIDE SERPL-SCNC: 100 MMOL/L (ref 96–112)
CO2 SERPL-SCNC: 21 MMOL/L (ref 20–33)
CREAT SERPL-MCNC: 1.36 MG/DL (ref 0.5–1.4)
GLUCOSE SERPL-MCNC: 484 MG/DL (ref 65–99)
POTASSIUM SERPL-SCNC: 4.7 MMOL/L (ref 3.6–5.5)
SODIUM SERPL-SCNC: 131 MMOL/L (ref 135–145)

## 2019-07-12 PROCEDURE — 80048 BASIC METABOLIC PNL TOTAL CA: CPT

## 2019-07-12 PROCEDURE — 36415 COLL VENOUS BLD VENIPUNCTURE: CPT

## 2019-07-16 ENCOUNTER — OFFICE VISIT (OUTPATIENT)
Dept: MEDICAL GROUP | Facility: MEDICAL CENTER | Age: 69
End: 2019-07-16
Payer: COMMERCIAL

## 2019-07-16 VITALS
OXYGEN SATURATION: 94 % | DIASTOLIC BLOOD PRESSURE: 64 MMHG | SYSTOLIC BLOOD PRESSURE: 140 MMHG | WEIGHT: 176 LBS | BODY MASS INDEX: 26.07 KG/M2 | HEART RATE: 64 BPM | HEIGHT: 69 IN | TEMPERATURE: 98.4 F

## 2019-07-16 DIAGNOSIS — Z95.1 S/P CABG X 2: ICD-10-CM

## 2019-07-16 DIAGNOSIS — E11.9 DIABETES MELLITUS TYPE 2 IN NONOBESE (HCC): ICD-10-CM

## 2019-07-16 DIAGNOSIS — I10 ESSENTIAL HYPERTENSION: ICD-10-CM

## 2019-07-16 DIAGNOSIS — N18.30 CKD (CHRONIC KIDNEY DISEASE) STAGE 3, GFR 30-59 ML/MIN (HCC): ICD-10-CM

## 2019-07-16 DIAGNOSIS — E78.2 MIXED HYPERLIPIDEMIA: ICD-10-CM

## 2019-07-16 LAB
HBA1C MFR BLD: 13.8 % (ref 0–5.6)
INT CON NEG: ABNORMAL
INT CON POS: ABNORMAL

## 2019-07-16 PROCEDURE — 99214 OFFICE O/P EST MOD 30 MIN: CPT | Performed by: FAMILY MEDICINE

## 2019-07-16 PROCEDURE — 83036 HEMOGLOBIN GLYCOSYLATED A1C: CPT | Performed by: FAMILY MEDICINE

## 2019-07-16 RX ORDER — LANCETS 30 GAUGE
EACH MISCELLANEOUS
Qty: 100 EACH | Refills: 3 | Status: SHIPPED | OUTPATIENT
Start: 2019-07-16

## 2019-07-16 ASSESSMENT — PATIENT HEALTH QUESTIONNAIRE - PHQ9: CLINICAL INTERPRETATION OF PHQ2 SCORE: 0

## 2019-07-16 NOTE — PROGRESS NOTES
CC: Diabetes type 2, hypertension, hyperlipidemia, CAD/CABG, CKD    HPI:   Evans presents today to discuss the following medical issues:    Diabetes mellitus type 2 in nonobese ([facility])  He is a newly diagnosed diabetes.  Patient has been having high blood sugar in his previous complete metabolic panel.  He denies any polyuria and polydipsia.  His A1c is checked today and was really high, 13.8.  Patient has not been on medication for diabetes in the past.     Essential hypertension  Blood pressure has been adequately controlled.  Denies headache, chest pain, shortness of breath.  Patient has been on lisinopril 10 mg daily.      Mixed hyperlipidemia  He has been tolerating the statin. Denies muscle pain LFTs has been normal, patient has been on atorvastatin 40 mg daily.    Coronary artery disease, S/P CABG x 2  Denies any new chest pain.  Patient has been on atorvastatin, carvedilol, and Plavix.  He follows up with cardiology Dr. Jacob in a regular basis.     CKD (chronic kidney disease) stage 3, GFR 30-59 ml/min ([facility])  Please most recent GFR was 52, creatinine 1.3.  Patient is diabetic, newly diagnosed, A1c 13.8.  Discussed with patient that we are going to stop metformin, and will continue monitoring his kidney functions to make sure it is not affected by the metformin.  Discussed checking his urine for microalbuminuria to rule out any diabetic nephropathy.          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 60 Tab 3   • Blood Glucose Monitoring Suppl Device Meter: Dispense Device of Insurance Preference( free style lite). Sig. Use as directed for blood sugar monitoring. #1. NR. 1 Device 0   • Blood Glucose Test Strips Test strips order: Test strips for free style lite meter. Sig: use 3 times daily  and prn ssx high or low sugar . 100 Each 3   • Lancets Lancets order: Lancets for free style lite meter. Sig: use 3 times daily  and prn ssx high or low sugar . 100 Each 3   • lisinopril (PRINIVIL) 10 MG Tab Take 1 Tab by mouth every day. 30 Tab 6   • BREO ELLIPTA 100-25 MCG/INH AEROSOL POWDER, BREATH ACTIVATED INHALE 1 PUFF BY MOUTH EVERY BEDTIME (MAX 30 DAYS PER INSURANCE) 60 Each 3   • carvedilol (COREG) 6.25 MG Tab Take 1 Tab by mouth 2 times a day, with meals. 180 Tab 3   • atorvastatin (LIPITOR) 40 MG Tab Take 1 Tab by mouth every bedtime. 90 Tab 3   • SPIRIVA HANDIHALER 18 MCG Cap INHALE 1 CAP BY MOUTH EVERY DAY. 90 Cap 3   • aspirin (ASA) 81 MG CHEW chewable tablet Take 1 Tab by mouth every day. 100 Tab 11   • albuterol 108 (90 BASE) MCG/ACT Aero Soln inhalation aerosol Inhale 2 Puffs by mouth every four hours as needed. 8.5 g      No current facility-administered medications for this visit.          Allergies as of 07/16/2019   • (No Known Allergies)        ROS: Denies any chest pain, Shortness of breath, Changes bowel or bladder, Lower extremity edema.    Physical Exam:  /64 (BP Location: Left arm, Patient Position: Sitting)   Pulse 64   Temp 36.9 °C (98.4 °F)   Ht 1.753 m (5' 9\")   Wt 79.8 kg (176 lb)   SpO2 94%   BMI 25.99 kg/m²   Gen.: Well-developed, well-nourished, no apparent distress,pleasant and cooperative with the examination  Skin:  Warm and dry with good turgor. No rashes or suspicious lesions in visible areas  HEENT:Sinuses nontender with palpation, TMs clear, nares patent with pink mucosa and clear rhinorrhea,no septal deviation ,polyps or lesions. lips " without lesions, oropharynx clear.  Neck: Trachea midline,no masses or adenopathy. No JVD.  Cor: Regular rate and rhythm without murmur, gallop or rub.  Lungs: Respirations unlabored.Clear to auscultation with equal breath sounds bilaterally. No wheezes, rhonchi.  Extremities: No cyanosis, clubbing or edema.        Assessment and Plan.   69 y.o. male     1. Diabetes mellitus type 2 in nonobese ([facility])  Newly diagnosed.  A1c today 13.8.  Will start patient on metformin 1000 mg twice a day.  Patient advised to take 500 mg twice a day for a week, then start 1000 mg twice a day after that.  Patient advised to check his blood sugar 3 times a day, keep a log and return to the clinic in a month for reevaluation.  Consider add glipizide if blood sugar is not controlled.    - POCT  A1C  - MICROALBUMIN CREAT RATIO URINE; Future  - metFORMIN (GLUCOPHAGE) 500 MG Tab; Take 2 Tabs by mouth 2 times a day, with meals.  Dispense: 60 Tab; Refill: 3  - Blood Glucose Monitoring Suppl Device; Meter: Dispense Device of Insurance Preference( free style lite). Sig. Use as directed for blood sugar monitoring. #1. NR.  Dispense: 1 Device; Refill: 0  - Blood Glucose Test Strips; Test strips order: Test strips for free style lite meter. Sig: use 3 times daily  and prn ssx high or low sugar .  Dispense: 100 Each; Refill: 3  - Lancets; Lancets order: Lancets for free style lite meter. Sig: use 3 times daily  and prn ssx high or low sugar .  Dispense: 100 Each; Refill: 3    2. Essential hypertension  Adequately controlled.  Continue lisinopril 10 mg daily.    3. Mixed hyperlipidemia  He has been tolerating the statin. Denies muscle pain LFTs has been normal  Continue on atorvastatin 40 mg daily.    4. S/P CABG x 2  Stable.  Asymptomatic.  Continue on statin, aspirin, and carvedilol.    5. CKD (chronic kidney disease) stage 3, GFR 30-59 ml/min ([facility])  Most recent GFR was 50, creatinine 1.3.  Patient is diabetic, newly diagnosed, will start patient on  metformin which is not contraindicated at this time.  Will repeat kidney functions in 1 months after he starts the metformin.  Will check urine for microalbuminuria to rule out diabetic nephropathy.    - Basic Metabolic Panel; Future

## 2019-08-10 DIAGNOSIS — J44.9 CHRONIC OBSTRUCTIVE PULMONARY DISEASE, UNSPECIFIED COPD TYPE (HCC): ICD-10-CM

## 2019-08-10 DIAGNOSIS — I50.20 SYSTOLIC CHF WITH REDUCED LEFT VENTRICULAR FUNCTION, NYHA CLASS 2 (HCC): ICD-10-CM

## 2019-08-12 RX ORDER — ATORVASTATIN CALCIUM 40 MG/1
TABLET, FILM COATED ORAL
Qty: 90 TAB | Refills: 3 | Status: SHIPPED | OUTPATIENT
Start: 2019-08-12 | End: 2021-01-12 | Stop reason: SDUPTHER

## 2019-08-12 RX ORDER — CARVEDILOL 6.25 MG/1
TABLET ORAL
Qty: 180 TAB | Refills: 3 | Status: SHIPPED | OUTPATIENT
Start: 2019-08-12

## 2019-08-12 RX ORDER — TIOTROPIUM BROMIDE 18 UG/1
18 CAPSULE ORAL; RESPIRATORY (INHALATION) DAILY
Qty: 30 CAP | Refills: 11 | Status: SHIPPED | OUTPATIENT
Start: 2019-08-12 | End: 2020-09-08

## 2019-09-09 DIAGNOSIS — E11.9 DIABETES MELLITUS TYPE 2 IN NONOBESE (HCC): ICD-10-CM

## 2019-11-06 ENCOUNTER — OFFICE VISIT (OUTPATIENT)
Dept: MEDICAL GROUP | Facility: MEDICAL CENTER | Age: 69
End: 2019-11-06
Payer: COMMERCIAL

## 2019-11-06 VITALS
DIASTOLIC BLOOD PRESSURE: 80 MMHG | HEIGHT: 69 IN | OXYGEN SATURATION: 95 % | SYSTOLIC BLOOD PRESSURE: 160 MMHG | TEMPERATURE: 98.4 F | RESPIRATION RATE: 16 BRPM | HEART RATE: 89 BPM | WEIGHT: 184 LBS | BODY MASS INDEX: 27.25 KG/M2

## 2019-11-06 DIAGNOSIS — Z95.1 S/P CABG X 2: ICD-10-CM

## 2019-11-06 DIAGNOSIS — Z23 NEED FOR VACCINATION: ICD-10-CM

## 2019-11-06 DIAGNOSIS — E78.2 MIXED HYPERLIPIDEMIA: ICD-10-CM

## 2019-11-06 DIAGNOSIS — I10 ESSENTIAL HYPERTENSION: ICD-10-CM

## 2019-11-06 PROCEDURE — 90471 IMMUNIZATION ADMIN: CPT | Performed by: FAMILY MEDICINE

## 2019-11-06 PROCEDURE — 99214 OFFICE O/P EST MOD 30 MIN: CPT | Mod: 25 | Performed by: FAMILY MEDICINE

## 2019-11-06 PROCEDURE — 90662 IIV NO PRSV INCREASED AG IM: CPT | Performed by: FAMILY MEDICINE

## 2019-11-06 RX ORDER — GLIPIZIDE 10 MG/1
10 TABLET ORAL 2 TIMES DAILY
Qty: 180 TAB | Refills: 3 | Status: SHIPPED | OUTPATIENT
Start: 2019-11-06 | End: 2021-01-12

## 2019-11-06 NOTE — PROGRESS NOTES
CC: Diabetes, hypertension, hyperlipidemia, CAD.    HPI:   Evans presents today discuss the following medical issues:     Uncontrolled diabetes mellitus type 2 without complications (HCC)  His blood sugar is uncontrolled.  However A1c has improved, A1c today is 10.3, it was 13.8 last visit.  Patient could not tolerate metformin 1000 mg twice a day, however he is able to tolerate 1000 mg once a day.  Has been checking his blood sugar 3 times a day has always been above 250.  He is due for monofilament exam, retinal exam, and microalbumin.  Screening.    Essential hypertension  Blood pressure today is slightly high, however patient denies headache, chest pain, shortness of breath.  He is currently on lisinopril 10 mg daily.      Mixed hyperlipidemia  He has been tolerating the statin. Denies muscle pain LFTs has been normal, has been on atorvastatin 40 mg daily.    S/P CABG x 2  Denies any chest pain, shortness of breath, leg swelling.  He is currently on carvedilol, atorvastatin, and aspirin.  He follows up with cardiology in a regular basis.      Due for flu shot, given today.    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 180 Tab 3   • metFORMIN (GLUCOPHAGE) 500 MG Tab Take 2 Tabs by mouth 2 times a day, with meals. 60 Tab 3   • carvedilol (COREG) 6.25 MG Tab TAKE 1 TAB BY MOUTH 2 TIMES A DAY, WITH MEALS.- INS MAX 30  Tab 3   • atorvastatin (LIPITOR) 40 MG Tab TAKE 1 TABLET BY MOUTH  "EVERYDAY AT BEDTIME 90 Tab 3   • SPIRIVA HANDIHALER 18 MCG Cap INHALE 1 CAP BY MOUTH EVERY DAY. 30 Cap 11   • Blood Glucose Monitoring Suppl Device Meter: Dispense Device of Insurance Preference( free style lite). Sig. Use as directed for blood sugar monitoring. #1. NR. 1 Device 0   • Blood Glucose Test Strips Test strips order: Test strips for free style lite meter. Sig: use 3 times daily  and prn ssx high or low sugar . 100 Each 3   • Lancets Lancets order: Lancets for free style lite meter. Sig: use 3 times daily  and prn ssx high or low sugar . 100 Each 3   • lisinopril (PRINIVIL) 10 MG Tab Take 1 Tab by mouth every day. 30 Tab 6   • BREO ELLIPTA 100-25 MCG/INH AEROSOL POWDER, BREATH ACTIVATED INHALE 1 PUFF BY MOUTH EVERY BEDTIME (MAX 30 DAYS PER INSURANCE) 60 Each 3   • albuterol 108 (90 BASE) MCG/ACT Aero Soln inhalation aerosol Inhale 2 Puffs by mouth every four hours as needed. 8.5 g    • aspirin (ASA) 81 MG CHEW chewable tablet Take 1 Tab by mouth every day. 100 Tab 11     No current facility-administered medications for this visit.          Allergies as of 11/06/2019   • (No Known Allergies)        ROS: Denies any chest pain, Shortness of breath, Changes bowel or bladder, Lower extremity edema.    Physical Exam:  /80 (BP Location: Right arm, Patient Position: Sitting, BP Cuff Size: Adult)   Pulse 89   Temp 36.9 °C (98.4 °F) (Temporal)   Resp 16   Ht 1.753 m (5' 9\")   Wt 83.5 kg (184 lb)   SpO2 95%   BMI 27.17 kg/m²   Gen.: Well-developed, well-nourished, no apparent distress,pleasant and cooperative with the examination  Skin:  Warm and dry with good turgor. No rashes or suspicious lesions in visible areas  HEENT:Sinuses nontender with palpation, TMs clear, nares patent with pink mucosa and clear rhinorrhea,no septal deviation ,polyps or lesions. lips without lesions, oropharynx clear.  Neck: Trachea midline,no masses or adenopathy. No JVD.  Cor: Regular rate and rhythm without murmur, gallop " or rub.  Lungs: Respirations unlabored.Clear to auscultation with equal breath sounds bilaterally. No wheezes, rhonchi.  Extremities: No cyanosis, clubbing or edema.      Monofilament: done  Monofilament testing with a 10 gram force: sensation intact: intact bilaterally  Visual Inspection: Feet without maceration, ulcers, fissures.  Pedal pulses: intact bilaterally      Assessment and Plan.   69 y.o. male     1. Uncontrolled diabetes mellitus type 2 without complications (HCC)  Uncontrolled.  However A1c has improved, A1c today is 10.3, it was 13.8 last visit.  Patient could not tolerate metformin 1000 mg twice a day, however he is able to tolerate 1000 mg once a day.  Will add glipizide 10 mg twice a day, patient advised to take glipizide 5 mg twice a day for a week then after that start 10 mg twice a day.  Patient advised to return to the clinic in a month with blood sugar log for reevaluation(advised to check his blood sugar 3 times a day for a month).  Monofilament foot exam showed no sign of neuropathy.  Patient is due for retinal exam, done in the clinic today.  Due for microalbuminuria screening.    - glipiZIDE (GLUCOTROL) 10 MG Tab; Take 1 Tab by mouth 2 times a day.  Dispense: 180 Tab; Refill: 3  - Diabetic Monofilament Lower Extremity Exam  - MICROALBUMIN CREAT RATIO URINE (LAB COLLECT); Future  - POCT Retinal Eye Exam    2. Need for vaccination  Due for flu shot, given today.  - INFLUENZA VACCINE, HIGH DOSE (65+ ONLY)    3. Essential hypertension  Slightly high.  Will increase lisinopril to 20 mg daily.    4. Mixed hyperlipidemia  He has been tolerating the statin. Denies muscle pain LFTs has been normal  Continue on atorvastatin 40 mg daily.    5. S/P CABG x 2  Stable.  Asymptomatic.  Continue on carvedilol, atorvastatin, and aspirin.  Continue follow-up with cardiology.

## 2019-12-10 ENCOUNTER — HOSPITAL ENCOUNTER (OUTPATIENT)
Dept: LAB | Facility: MEDICAL CENTER | Age: 69
End: 2019-12-10
Attending: FAMILY MEDICINE
Payer: COMMERCIAL

## 2019-12-10 LAB
CREAT UR-MCNC: 91.1 MG/DL
MICROALBUMIN UR-MCNC: 73.1 MG/DL
MICROALBUMIN/CREAT UR: 802 MG/G (ref 0–30)

## 2019-12-10 PROCEDURE — 82570 ASSAY OF URINE CREATININE: CPT

## 2019-12-10 PROCEDURE — 82043 UR ALBUMIN QUANTITATIVE: CPT

## 2019-12-11 DIAGNOSIS — I10 ESSENTIAL HYPERTENSION: Primary | ICD-10-CM

## 2019-12-12 RX ORDER — LISINOPRIL 10 MG/1
TABLET ORAL
Qty: 90 TAB | Refills: 1 | Status: SHIPPED | OUTPATIENT
Start: 2019-12-12 | End: 2021-01-12

## 2019-12-13 ENCOUNTER — OFFICE VISIT (OUTPATIENT)
Dept: MEDICAL GROUP | Facility: MEDICAL CENTER | Age: 69
End: 2019-12-13
Payer: COMMERCIAL

## 2019-12-13 VITALS
RESPIRATION RATE: 16 BRPM | OXYGEN SATURATION: 96 % | HEART RATE: 75 BPM | WEIGHT: 194 LBS | BODY MASS INDEX: 28.73 KG/M2 | SYSTOLIC BLOOD PRESSURE: 170 MMHG | DIASTOLIC BLOOD PRESSURE: 80 MMHG | HEIGHT: 69 IN | TEMPERATURE: 98.7 F

## 2019-12-13 DIAGNOSIS — I10 ESSENTIAL HYPERTENSION: ICD-10-CM

## 2019-12-13 DIAGNOSIS — R80.9 MICROALBUMINURIA DUE TO TYPE 2 DIABETES MELLITUS (HCC): ICD-10-CM

## 2019-12-13 DIAGNOSIS — E11.29 MICROALBUMINURIA DUE TO TYPE 2 DIABETES MELLITUS (HCC): ICD-10-CM

## 2019-12-13 PROCEDURE — 99214 OFFICE O/P EST MOD 30 MIN: CPT | Performed by: FAMILY MEDICINE

## 2019-12-15 PROBLEM — E11.29 MICROALBUMINURIA DUE TO TYPE 2 DIABETES MELLITUS (HCC): Status: ACTIVE | Noted: 2019-12-15

## 2019-12-15 PROBLEM — R80.9 MICROALBUMINURIA DUE TO TYPE 2 DIABETES MELLITUS (HCC): Status: ACTIVE | Noted: 2019-12-15

## 2019-12-15 RX ORDER — LISINOPRIL 20 MG/1
20 TABLET ORAL DAILY
Qty: 90 TAB | Refills: 3 | Status: SHIPPED | OUTPATIENT
Start: 2019-12-15 | End: 2020-12-21

## 2019-12-15 NOTE — PROGRESS NOTES
CC: Uncontrolled diabetes and high blood pressure    HPI:   Evans presents today to discuss the following    Uncontrolled diabetes mellitus type 2 with complications (HCC)/microalbuminuria  Patient was seen a month ago his A1c went down from 13.8 to10.3.  He was advised however he advised to check his blood sugar and came in today for evaluation.  Patient stated that his blood sugar has been mostly less than 200.  He is currently on metformin 1000 mg twice a day, and glipizide 10 mg twice a day.  His A1c was checked today by mistake, his record was not checked because epic was down.  However his A1c today is 8.6.  Most recent urine test showed microalbuminuria, his kidney function has been slightly affected, has normal BUN and creatinine, slightly low GFR(52)..      Essential hypertension  Blood pressure today is high, he denies headache, chest pain, shortness of breath.    Patient stated that he has been taking lisinopril 10 mg daily.           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 180 Tab 3   • metFORMIN (GLUCOPHAGE) 500 MG Tab Take 2 Tabs by mouth 2 times a day, with meals. 60 Tab 3   • carvedilol (COREG) 6.25 MG Tab TAKE 1 TAB BY MOUTH 2 TIMES A DAY, WITH MEALS.- INS MAX 30  Tab 3   • atorvastatin (LIPITOR) 40 MG Tab TAKE 1 TABLET BY MOUTH EVERYDAY AT BEDTIME 90 Tab 3   • SPIRIVA HANDIHALER 18 MCG  "Cap INHALE 1 CAP BY MOUTH EVERY DAY. 30 Cap 11   • Blood Glucose Monitoring Suppl Device Meter: Dispense Device of Insurance Preference( free style lite). Sig. Use as directed for blood sugar monitoring. #1. NR. 1 Device 0   • Blood Glucose Test Strips Test strips order: Test strips for free style lite meter. Sig: use 3 times daily  and prn ssx high or low sugar . 100 Each 3   • Lancets Lancets order: Lancets for free style lite meter. Sig: use 3 times daily  and prn ssx high or low sugar . 100 Each 3   • BREO ELLIPTA 100-25 MCG/INH AEROSOL POWDER, BREATH ACTIVATED INHALE 1 PUFF BY MOUTH EVERY BEDTIME (MAX 30 DAYS PER INSURANCE) 60 Each 3   • albuterol 108 (90 BASE) MCG/ACT Aero Soln inhalation aerosol Inhale 2 Puffs by mouth every four hours as needed. 8.5 g    • aspirin (ASA) 81 MG CHEW chewable tablet Take 1 Tab by mouth every day. 100 Tab 11     No current facility-administered medications for this visit.          Allergies as of 12/13/2019   • (No Known Allergies)        ROS: Denies any chest pain, Shortness of breath, Changes bowel or bladder, Lower extremity edema.    Physical Exam:  BP (!) 170/80   Pulse 75   Temp 37.1 °C (98.7 °F)   Resp 16   Ht 1.753 m (5' 9\")   Wt 88 kg (194 lb)   SpO2 96%   BMI 28.65 kg/m²   Gen.: Well-developed, well-nourished, no apparent distress,pleasant and cooperative with the examination  Skin:  Warm and dry with good turgor. No rashes or suspicious lesions in visible areas  HEENT:Sinuses nontender with palpation, TMs clear, nares patent with pink mucosa and clear rhinorrhea,no septal deviation ,polyps or lesions. lips without lesions, oropharynx clear.  Neck: Trachea midline,no masses or adenopathy. No JVD.  Cor: Regular rate and rhythm without murmur, gallop or rub.  Lungs: Respirations unlabored.Clear to auscultation with equal breath sounds bilaterally. No wheezes, rhonchi.  Extremities: No cyanosis, clubbing or edema.        Assessment and Plan.   69 y.o. male     1. " Uncontrolled diabetes mellitus type 2 with complications (HCC)  Blood glucose has improved.  For now continue on metformin 1000 mg twice a day and glipizide 10 mg twice a day.  Discussed symptoms of hypoglycemia, patient stated that he has never had one.  Patient is counseled on lifestyle modification.    2. Essential hypertension  Blood pressure today is high.  His lisinopril was increased last visit from 10 to 20 mg however patient has been taking only 10 mg daily.  Advised to increase lisinopril from 10 mg to 20 mg    - lisinopril (PRINIVIL) 20 MG Tab; Take 1 Tab by mouth every day.  Dispense: 90 Tab; Refill: 3    3. Microalbuminuria due to type 2 diabetes mellitus (HCC)  Most recent urine test showed microalbuminuria, probably due to diabetes and/or hypertension.  Has normal BUN/creatinine, slightly low GFR(52)  Continue on ACE inhibitor.  Continue monitor his kidney function.

## 2020-03-12 DIAGNOSIS — J44.9 CHRONIC OBSTRUCTIVE PULMONARY DISEASE, UNSPECIFIED COPD TYPE (HCC): ICD-10-CM

## 2020-03-13 DIAGNOSIS — J44.9 CHRONIC OBSTRUCTIVE PULMONARY DISEASE, UNSPECIFIED COPD TYPE (HCC): ICD-10-CM

## 2020-07-04 DIAGNOSIS — E11.9 DIABETES MELLITUS TYPE 2 IN NONOBESE (HCC): ICD-10-CM

## 2020-08-19 ENCOUNTER — TELEPHONE (OUTPATIENT)
Dept: SCHEDULING | Facility: IMAGING CENTER | Age: 70
End: 2020-08-19

## 2020-08-19 DIAGNOSIS — E11.29 MICROALBUMINURIA DUE TO TYPE 2 DIABETES MELLITUS (HCC): ICD-10-CM

## 2020-08-19 DIAGNOSIS — I10 ESSENTIAL HYPERTENSION: ICD-10-CM

## 2020-08-19 DIAGNOSIS — R80.9 MICROALBUMINURIA DUE TO TYPE 2 DIABETES MELLITUS (HCC): ICD-10-CM

## 2020-08-19 NOTE — TELEPHONE ENCOUNTER
Good Morning Dr Hernandez,    Patient is requesting lab orders for A1c screening, Fasting Lipid Profile, and Serum Creatinine.    Please advise.    Thank you.

## 2020-09-03 ENCOUNTER — TELEPHONE (OUTPATIENT)
Dept: MEDICAL GROUP | Facility: MEDICAL CENTER | Age: 70
End: 2020-09-03

## 2020-09-03 ENCOUNTER — OFFICE VISIT (OUTPATIENT)
Dept: MEDICAL GROUP | Facility: MEDICAL CENTER | Age: 70
End: 2020-09-03
Payer: COMMERCIAL

## 2020-09-03 VITALS
HEIGHT: 69 IN | DIASTOLIC BLOOD PRESSURE: 70 MMHG | RESPIRATION RATE: 16 BRPM | OXYGEN SATURATION: 98 % | WEIGHT: 175 LBS | TEMPERATURE: 98.3 F | SYSTOLIC BLOOD PRESSURE: 148 MMHG | HEART RATE: 62 BPM | BODY MASS INDEX: 25.92 KG/M2

## 2020-09-03 PROCEDURE — 99214 OFFICE O/P EST MOD 30 MIN: CPT | Performed by: FAMILY MEDICINE

## 2020-09-03 RX ORDER — INSULIN LISPRO 100 [IU]/ML
10 INJECTION, SOLUTION INTRAVENOUS; SUBCUTANEOUS
Qty: 30 ML | Refills: 3 | Status: SHIPPED | OUTPATIENT
Start: 2020-09-03

## 2020-09-03 RX ORDER — INSULIN ASPART 100 [IU]/ML
4-15 INJECTION, SOLUTION INTRAVENOUS; SUBCUTANEOUS
Qty: 45 ML | Refills: 3 | Status: SHIPPED | OUTPATIENT
Start: 2020-09-03 | End: 2020-09-03

## 2020-09-03 RX ORDER — INSULIN GLARGINE 100 [IU]/ML
20 INJECTION, SOLUTION SUBCUTANEOUS EVERY EVENING
Qty: 30 ML | Refills: 3 | Status: SHIPPED | OUTPATIENT
Start: 2020-09-03

## 2020-09-03 ASSESSMENT — PATIENT HEALTH QUESTIONNAIRE - PHQ9: CLINICAL INTERPRETATION OF PHQ2 SCORE: 0

## 2020-09-03 NOTE — PROGRESS NOTES
CC: Uncontrolled diabetes    HPI:   Evans presents today with uncontrolled blood glucose.  Patient has been having high blood glucose, last time was seen in December 2019, his A1c has improved from 13 to 10%, patient was put on metformin and glipizide maximum dose, was followed up a month later, and was told to come back in 2 months, but he did not.  Patient stated that he stopped taking the medication a month ago without communicating with his PCP because he got some complications from the metformin. Patient stated that his blood glucose has been running between 400-500.  Has been having polyuria, denies polydipsia, has been feeling a little bit tired, but has been active.  Denies nausea vomiting and abdominal pain.      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 45 mL 3   • insulin glargine (LANTUS SOLOSTAR) 100 UNIT/ML Solution Pen-injector injection Inject 20 Units as instructed every evening. 30 mL 3   • BREO ELLIPTA 100-25 MCG/INH AEROSOL POWDER, BREATH ACTIVATED INHALE 1 PUFF BY MOUTH EVERY BEDTIME (MAX 30 DAYS PER INSURANCE) 1 Each 3   • lisinopril (PRINIVIL) 20 MG Tab Take 1 Tab by mouth every day. 90 Tab 3   • carvedilol (COREG) 6.25 MG Tab TAKE 1 TAB BY MOUTH 2 TIMES A DAY, WITH MEALS.-  "INS MAX 30  Tab 3   • atorvastatin (LIPITOR) 40 MG Tab TAKE 1 TABLET BY MOUTH EVERYDAY AT BEDTIME 90 Tab 3   • SPIRIVA HANDIHALER 18 MCG Cap INHALE 1 CAP BY MOUTH EVERY DAY. 30 Cap 11   • albuterol 108 (90 BASE) MCG/ACT Aero Soln inhalation aerosol Inhale 2 Puffs by mouth every four hours as needed. 8.5 g    • aspirin (ASA) 81 MG CHEW chewable tablet Take 1 Tab by mouth every day. 100 Tab 11   • metFORMIN (GLUCOPHAGE) 500 MG Tab TAKE 2 TABS BY MOUTH 2 TIMES A DAY, WITH MEALS. (Patient not taking: Reported on 9/3/2020) 360 Tab 3   • Fluticasone Furoate-Vilanterol (BREO ELLIPTA) 100-25 MCG/INH AEROSOL POWDER, BREATH ACTIVATED Inhale 1 Puff by mouth every day. 1 Each 3   • lisinopril (PRINIVIL) 10 MG Tab TAKE 1 TABLET BY MOUTH EVERY DAY 90 Tab 1   • glipiZIDE (GLUCOTROL) 10 MG Tab Take 1 Tab by mouth 2 times a day. (Patient not taking: Reported on 9/3/2020) 180 Tab 3   • Blood Glucose Monitoring Suppl Device Meter: Dispense Device of Insurance Preference( free style lite). Sig. Use as directed for blood sugar monitoring. #1. NR. 1 Device 0   • Blood Glucose Test Strips Test strips order: Test strips for free style lite meter. Sig: use 3 times daily  and prn ssx high or low sugar . 100 Each 3   • Lancets Lancets order: Lancets for free style lite meter. Sig: use 3 times daily  and prn ssx high or low sugar . 100 Each 3     No current facility-administered medications for this visit.          Allergies as of 09/03/2020   • (No Known Allergies)        ROS: Denies any chest pain, Shortness of breath, Changes bowel or bladder, Lower extremity edema.    Physical Exam:  /70 (BP Location: Right arm, Patient Position: Sitting, BP Cuff Size: Adult)   Pulse 62   Temp 36.8 °C (98.3 °F) (Temporal)   Resp 16   Ht 1.753 m (5' 9\")   Wt 79.4 kg (175 lb)   SpO2 98%   BMI 25.84 kg/m²   Gen.: Well-developed, well-nourished, no apparent distress,pleasant and cooperative with the examination  Skin:  Warm and dry with " good turgor. No rashes or suspicious lesions in visible areas  Neck: Trachea midline,no masses or adenopathy. No JVD.  Cor: Regular rate and rhythm without murmur, gallop or rub.  Lungs: Respirations unlabored.Clear to auscultation with equal breath sounds bilaterally. No wheezes, rhonchi.  Extremities: No cyanosis, clubbing or edema.      Assessment and Plan.   70 y.o. male     1. Uncontrolled diabetes mellitus with complications (HCC)  A1c unreadable with our machine  Accu-Chek: [id number]  Patient advised to start taking short acting and long-acting insulin today, advised to inject himself with 15 units of short acting as soon as he gets insulin, advised to get his insulin today as soon as possible, if he has not able to do that he should go to the ER.  Patient's vital has been stable.  Patient advised to go to ER anyway if he develops any red flags symptoms(nausea, vomiting, abdominal pain, severe tiredness)  Patient advised to return to the clinic next week with a blood glucose log reevaluate and adjust insulin.    - POCT  A1C  - insulin glargine (LANTUS SOLOSTAR) 100 UNIT/ML Solution Pen-injector injection; Inject 20 Units as instructed every evening.  Dispense: 30 mL; Refill: 3

## 2020-09-03 NOTE — TELEPHONE ENCOUNTER
Yes, go ahead and start Humalog 10 units 3 times a day.  If he is not taking the long-acting increase the dose to 15 units 3 times a day until I see the patient on Monday.  Medication sent to the pharmacy

## 2020-09-03 NOTE — TELEPHONE ENCOUNTER
Patients insurance is not covering an insulin. They asked if he could try short acting humalog kwik pen.

## 2020-09-04 ENCOUNTER — HOSPITAL ENCOUNTER (OUTPATIENT)
Dept: LAB | Facility: MEDICAL CENTER | Age: 70
End: 2020-09-04
Attending: FAMILY MEDICINE
Payer: COMMERCIAL

## 2020-09-04 DIAGNOSIS — E11.29 MICROALBUMINURIA DUE TO TYPE 2 DIABETES MELLITUS (HCC): ICD-10-CM

## 2020-09-04 DIAGNOSIS — R80.9 MICROALBUMINURIA DUE TO TYPE 2 DIABETES MELLITUS (HCC): ICD-10-CM

## 2020-09-04 DIAGNOSIS — I10 ESSENTIAL HYPERTENSION: ICD-10-CM

## 2020-09-04 LAB
ALBUMIN SERPL BCP-MCNC: 4.3 G/DL (ref 3.2–4.9)
ALBUMIN/GLOB SERPL: 1.6 G/DL
ALP SERPL-CCNC: 117 U/L (ref 30–99)
ALT SERPL-CCNC: 20 U/L (ref 2–50)
ANION GAP SERPL CALC-SCNC: 11 MMOL/L (ref 7–16)
AST SERPL-CCNC: 11 U/L (ref 12–45)
BASOPHILS # BLD AUTO: 0.5 % (ref 0–1.8)
BASOPHILS # BLD: 0.03 K/UL (ref 0–0.12)
BILIRUB SERPL-MCNC: 0.5 MG/DL (ref 0.1–1.5)
BUN SERPL-MCNC: 41 MG/DL (ref 8–22)
CALCIUM SERPL-MCNC: 9.9 MG/DL (ref 8.5–10.5)
CHLORIDE SERPL-SCNC: 96 MMOL/L (ref 96–112)
CHOLEST SERPL-MCNC: 141 MG/DL (ref 100–199)
CO2 SERPL-SCNC: 22 MMOL/L (ref 20–33)
CREAT SERPL-MCNC: 1.65 MG/DL (ref 0.5–1.4)
CREAT UR-MCNC: 71.88 MG/DL
EOSINOPHIL # BLD AUTO: 0.14 K/UL (ref 0–0.51)
EOSINOPHIL NFR BLD: 2.2 % (ref 0–6.9)
ERYTHROCYTE [DISTWIDTH] IN BLOOD BY AUTOMATED COUNT: 42.2 FL (ref 35.9–50)
EST. AVERAGE GLUCOSE BLD GHB EST-MCNC: 464 MG/DL
GLOBULIN SER CALC-MCNC: 2.7 G/DL (ref 1.9–3.5)
GLUCOSE SERPL-MCNC: 484 MG/DL (ref 65–99)
HBA1C MFR BLD: 17.8 % (ref 0–5.6)
HCT VFR BLD AUTO: 41.5 % (ref 42–52)
HDLC SERPL-MCNC: 25 MG/DL
HGB BLD-MCNC: 14.1 G/DL (ref 14–18)
IMM GRANULOCYTES # BLD AUTO: 0.07 K/UL (ref 0–0.11)
IMM GRANULOCYTES NFR BLD AUTO: 1.1 % (ref 0–0.9)
LDLC SERPL CALC-MCNC: 42 MG/DL
LYMPHOCYTES # BLD AUTO: 1.72 K/UL (ref 1–4.8)
LYMPHOCYTES NFR BLD: 26.7 % (ref 22–41)
MCH RBC QN AUTO: 30.5 PG (ref 27–33)
MCHC RBC AUTO-ENTMCNC: 34 G/DL (ref 33.7–35.3)
MCV RBC AUTO: 89.8 FL (ref 81.4–97.8)
MICROALBUMIN UR-MCNC: 32.1 MG/DL
MICROALBUMIN/CREAT UR: 447 MG/G (ref 0–30)
MONOCYTES # BLD AUTO: 0.68 K/UL (ref 0–0.85)
MONOCYTES NFR BLD AUTO: 10.6 % (ref 0–13.4)
NEUTROPHILS # BLD AUTO: 3.79 K/UL (ref 1.82–7.42)
NEUTROPHILS NFR BLD: 58.9 % (ref 44–72)
NRBC # BLD AUTO: 0 K/UL
NRBC BLD-RTO: 0 /100 WBC
PLATELET # BLD AUTO: 240 K/UL (ref 164–446)
PMV BLD AUTO: 10.6 FL (ref 9–12.9)
POTASSIUM SERPL-SCNC: 5.6 MMOL/L (ref 3.6–5.5)
PROT SERPL-MCNC: 7 G/DL (ref 6–8.2)
RBC # BLD AUTO: 4.62 M/UL (ref 4.7–6.1)
SODIUM SERPL-SCNC: 129 MMOL/L (ref 135–145)
TRIGL SERPL-MCNC: 370 MG/DL (ref 0–149)
TSH SERPL DL<=0.005 MIU/L-ACNC: 1.81 UIU/ML (ref 0.38–5.33)
WBC # BLD AUTO: 6.4 K/UL (ref 4.8–10.8)

## 2020-09-04 PROCEDURE — 85025 COMPLETE CBC W/AUTO DIFF WBC: CPT

## 2020-09-04 PROCEDURE — 83036 HEMOGLOBIN GLYCOSYLATED A1C: CPT | Mod: GA

## 2020-09-04 PROCEDURE — 82043 UR ALBUMIN QUANTITATIVE: CPT

## 2020-09-04 PROCEDURE — 84443 ASSAY THYROID STIM HORMONE: CPT

## 2020-09-04 PROCEDURE — 80053 COMPREHEN METABOLIC PANEL: CPT

## 2020-09-04 PROCEDURE — 82570 ASSAY OF URINE CREATININE: CPT

## 2020-09-04 PROCEDURE — 80061 LIPID PANEL: CPT

## 2020-09-04 PROCEDURE — 36415 COLL VENOUS BLD VENIPUNCTURE: CPT

## 2020-09-07 DIAGNOSIS — E11.9 DIABETES MELLITUS TYPE 2 IN NONOBESE (HCC): ICD-10-CM

## 2020-09-07 DIAGNOSIS — J44.9 CHRONIC OBSTRUCTIVE PULMONARY DISEASE, UNSPECIFIED COPD TYPE (HCC): ICD-10-CM

## 2020-09-08 ENCOUNTER — OFFICE VISIT (OUTPATIENT)
Dept: MEDICAL GROUP | Facility: MEDICAL CENTER | Age: 70
End: 2020-09-08
Payer: COMMERCIAL

## 2020-09-08 VITALS
TEMPERATURE: 97.5 F | DIASTOLIC BLOOD PRESSURE: 76 MMHG | OXYGEN SATURATION: 94 % | RESPIRATION RATE: 16 BRPM | HEART RATE: 69 BPM | WEIGHT: 182.54 LBS | HEIGHT: 69 IN | SYSTOLIC BLOOD PRESSURE: 150 MMHG | BODY MASS INDEX: 27.04 KG/M2

## 2020-09-08 DIAGNOSIS — E78.2 MIXED HYPERLIPIDEMIA: ICD-10-CM

## 2020-09-08 DIAGNOSIS — I10 ESSENTIAL HYPERTENSION: ICD-10-CM

## 2020-09-08 DIAGNOSIS — Z95.1 S/P CABG X 2: ICD-10-CM

## 2020-09-08 DIAGNOSIS — E11.65 UNCONTROLLED TYPE 2 DIABETES MELLITUS WITH HYPERGLYCEMIA (HCC): ICD-10-CM

## 2020-09-08 PROCEDURE — 99214 OFFICE O/P EST MOD 30 MIN: CPT | Performed by: FAMILY MEDICINE

## 2020-09-08 RX ORDER — TIOTROPIUM BROMIDE 18 UG/1
18 CAPSULE ORAL; RESPIRATORY (INHALATION) DAILY
Qty: 90 CAP | Refills: 3 | Status: SHIPPED | OUTPATIENT
Start: 2020-09-08 | End: 2021-01-12

## 2020-09-08 RX ORDER — BLOOD-GLUCOSE METER
KIT MISCELLANEOUS
Qty: 100 STRIP | Refills: 3 | Status: SHIPPED | OUTPATIENT
Start: 2020-09-08 | End: 2020-12-17

## 2020-09-08 ASSESSMENT — FIBROSIS 4 INDEX: FIB4 SCORE: 0.74

## 2020-09-08 NOTE — PROGRESS NOTES
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 30 mL 3   • insulin lispro (HUMALOG KWIKPEN) 100 UNIT/ML Solution Pen-injector injection PEN Inject 10 Units as instructed 3 times a day before meals. 30 mL 3   • metFORMIN (GLUCOPHAGE) 500 MG Tab TAKE 2 TABS BY MOUTH 2 TIMES A DAY, WITH MEALS. (Patient not taking: Reported on 9/3/2020) 360 Tab 3   • Fluticasone Furoate-Vilanterol (BREO ELLIPTA) 100-25 MCG/INH AEROSOL POWDER, BREATH ACTIVATED Inhale 1 Puff by mouth every day. 1 Each 3   • BREO ELLIPTA 100-25 MCG/INH AEROSOL POWDER, BREATH ACTIVATED INHALE 1 PUFF BY MOUTH EVERY BEDTIME (MAX 30 DAYS PER INSURANCE) 1 Each 3   • lisinopril (PRINIVIL) 20 MG Tab Take 1 Tab by mouth every day. 90 Tab 3   • lisinopril (PRINIVIL) 10 MG Tab TAKE 1 TABLET BY MOUTH EVERY DAY 90 Tab 1   • glipiZIDE (GLUCOTROL) 10 MG Tab Take 1 Tab by mouth 2 times a day. (Patient not taking: Reported on 9/3/2020) 180 Tab 3   • carvedilol (COREG) 6.25 MG Tab TAKE 1 TAB BY MOUTH 2 TIMES A DAY, WITH MEALS.- INS MAX 30  Tab 3   • atorvastatin (LIPITOR) 40 MG Tab TAKE 1 TABLET BY MOUTH EVERYDAY AT BEDTIME 90 Tab 3   • SPIRIVA HANDIHALER 18 MCG Cap INHALE 1 CAP BY MOUTH EVERY DAY. 30  Cap 11   • Blood Glucose Monitoring Suppl Device Meter: Dispense Device of Insurance Preference( free style lite). Sig. Use as directed for blood sugar monitoring. #1. NR. 1 Device 0   • Blood Glucose Test Strips Test strips order: Test strips for free style lite meter. Sig: use 3 times daily  and prn ssx high or low sugar . 100 Each 3   • Lancets Lancets order: Lancets for free style lite meter. Sig: use 3 times daily  and prn ssx high or low sugar . 100 Each 3   • albuterol 108 (90 BASE) MCG/ACT Aero Soln inhalation aerosol Inhale 2 Puffs by mouth every four hours as needed. 8.5 g    • aspirin (ASA) 81 MG CHEW chewable tablet Take 1 Tab by mouth every day. 100 Tab 11     No current facility-administered medications for this visit.          Allergies as of 09/08/2020   • (No Known Allergies)        ROS: Denies any chest pain, Shortness of breath, Changes bowel or bladder, Lower extremity edema.    Physical Exam:  Gen.: Well-developed, well-nourished, no apparent distress,pleasant and cooperative with the examination  Skin:  Warm and dry with good turgor. No rashes or suspicious lesions in visible areas  Eye: PERRLA, conjunctiva and sclera clear, lids normal  HEENT: Normocephalic/atraumatic, sinuses nontender with palpation, TMs clear, nares patent with pink mucosa and clear rhinorrhea, lips without lesions, oropharynx clear.  Neck: Trachea midline,no masses or adenopathy  Thyroid: normal consistency and size. No masses or nodules. Not tender with palpation.  Cor: Regular rate and rhythm without murmur, gallop or rub.  Lungs: Respirations unlabored.Clear to auscultation with equal breath sounds bilaterally. No wheezes, rhonchi.  Abdomen: Soft nontender without hepatosplenomegaly or masses appreciated, normoactive bowel sounds. No hernias.  Extremities: No cyanosis, clubbing or edema, Symmetrical without deformities or malformations. Pulses 2+ and symmetrical both upper and lower extremities  Lymphatic: No abnormal  adenopathy of the neck groin or axillae.  Psych: Alert and oriented x 3.Normal affect, judgement,insight and memory.        Assessment and Plan.   70 y.o. male ***    There are no diagnoses linked to this encounter.

## 2020-09-25 ENCOUNTER — PATIENT MESSAGE (OUTPATIENT)
Dept: MEDICAL GROUP | Facility: MEDICAL CENTER | Age: 70
End: 2020-09-25

## 2020-09-29 ENCOUNTER — OFFICE VISIT (OUTPATIENT)
Dept: HEALTH INFORMATION MANAGEMENT | Facility: MEDICAL CENTER | Age: 70
End: 2020-09-29
Payer: COMMERCIAL

## 2020-09-29 PROCEDURE — G0109 DIAB MANAGE TRN IND/GROUP: HCPCS | Performed by: INTERNAL MEDICINE

## 2020-09-29 NOTE — LETTER
September 29, 2020      Alejandra Hernandez M.D.  [address]  [place] [address]          RE: Evans Díaz  [id number]    Dear:Alejandra Thompson *    The above referenced patient received 2 hours of diabetes education from [facility].    Topics taught (may include but not limited to):  Introduction to diabetes, benefits and responsibilities of patient, physiology of diabetes and the diease process, benefits of blood glucose monitoring and record keeping, medication action and possible side effects, hypoglycemia, sick day management, exercise, stress reduction and travel with diabetes.   Lipid panel:   Lab Results   Component Value Date/Time    CHOLSTRLTOT 141 09/04/2020 08:03 AM    LDL 42 09/04/2020 08:03 AM    HDL 25 (A) 09/04/2020 08:03 AM    TRIGLYCERIDE 370 (H) 09/04/2020 08:03 AM   HbA1c:   Lab Results   Component Value Date/Time    HBA1C 17.8 (H) 09/04/2020 08:03 AM      Patient/caregiver appeared to understand the content as demonstrated by appropriate questions.     Notes:currently on Lantus 30 units in the pm and 10 units in the morning, Humalog 15 units with meals.  Checking blood sugars 4 times per day.      The patient was provided with written materials to back-up verbal education.   Thank you for this consultation,     Aaliyah Amado R.N., CDE  Certified Diabetes Nurse Educator

## 2020-09-29 NOTE — PROGRESS NOTES
Evans attended the first day of the Diabetes Self Management Class.  He is currently on Lantus 30 units at hs and 10 units in the morning and Humalog 15 units with meals.   We discussed the addition of a correction scale of 1:50 over 150 to his meal time insulin and the need to discuss with his doctor.   He is testing his blood sugars 4 times per day recording and sending to his HCP weekly for review.   He currently is not exercising, we discussed the benefits of lowering his blood sugar and that it is good for his heart.   Diabetes Education Content    Introduction To Diabetes   Define Type 2 diabetes: Education taught  Define Type 1 diabetes: Education taught  Understand feaures and benefits of education and management: Education taught  Describe who is responsible for diabetes management: Education taught  Describe impact of diabetes on family/friends: Education taught  Discuss role of significant other in management: Education taught  Diabetes Lifestyle Changes / Goals  State benefits of making appropriate lifestyle changes: Education taught  Identify lifestyle behaviors participant wants to change: Education taught  Identify risk factors that interfere with health and strategies to reduce : Education taught  Verbalize need for and frequency of health care follow-up: Education taught  Develop behavioral objectives and expected health outcomes: Education taught  Diabetes Exercise and Activity  Describe role of exercise in diabetes management: Education taught  State relationship of exercise to blood sugar: Education taught  State the benefits/risk(s) of exercise and precautions to follow: Education taught  Diabetes Self Blood Glucose Monitoring  Discuss rationale and importance of SBGM: Education taught  Discuss appropriate record keeping: Education taught  Discuss how to use results from blood glucose testing: Education taught  Evaluation and interpretation of blood glucose patterns: Education taught  Diabetes  "Disease Process  Discuss signs, symptoms, TX and prevention of hyperglycemia: Education taught  Discuss beta cell dysfunction and insulin resistance: Education taught  Discuss Insulin and its role in the body: Education taught  Discuss the role of the liver in glucose metabolism: Education taught  Discuss hormonal regulation: Education taught  Define benefits of good control and discuss what it means to be in \"good control\": Education taught  Discuss impact of exercise, food, meds, stress, and special factors on diabetes: Education taught  Discuss when to confer with HCP for possible treatment plan adjustments: Education taught  Diabetes Insulin and Medications  Action of oral medications, onset and duration: Education taught  Discuss incretin secretagogs and their use in diabetes management: Education taught  Identify the onset, peak and duration of different insulin: Education taught  Discuss proper injection technique and site rotation: Education taught  Discuss storage of insulin and disposal of sharps: Education taught  Hypoglycemia  List signs, symptoms and causes of hypoglycemia: Education taught  Discuss physiology of hypoglycemic reactions: Education taught  Accurately describe appropriate treatment and prevention: Education taught  Discuss when to contact HCP: Education taught  Sick Day Care  Verbalize important items to monitor when sick and when to contact HCP: Education taught  Review sick day box: Education taught  State diabetes medication adjustments on sick days: Education taught  Complications (Chronic)  Explain prevention, TX, signs/symptoms of: retinopathy, neuropathy, nephropathy, infections: Education taught  Explain prevention, TX, signs/symptoms of: CAD, cerebral-vascular disease and sexual dysfunction: Education taught  Identify when to notify HCP of complications: Education taught  State principles of skin, dental and foot care.  Discuss proper foot care, prevention of foot probelms when to " notify HCP>: Education taught  Demonstrate how to examine feet and what to look for: Education taught  Discuss immunizations: Education taught  Psychosocial adjustment  Identify sources of stress: Education taught  Identify resources and techniques for stress reduction: Education taught  Discuss health care referral network / community resources for support: Education taught  Define proper precautions to use when traveling: Education taught     Define proper precautions to use for emergency preparedness: Education taught  Discuss emergency preparedness with diabetes: Education taught

## 2020-09-30 ENCOUNTER — NON-PROVIDER VISIT (OUTPATIENT)
Dept: HEALTH INFORMATION MANAGEMENT | Facility: MEDICAL CENTER | Age: 70
End: 2020-09-30
Payer: COMMERCIAL

## 2020-09-30 PROCEDURE — G0109 DIAB MANAGE TRN IND/GROUP: HCPCS | Performed by: INTERNAL MEDICINE

## 2020-09-30 NOTE — PROGRESS NOTES
9/30/2020    Alejandra Hernandez M.D. 49 y.o.   Time in/out: 9:00-12:00pm     Subjective:  -Here for day 2 Nutrition part of Type 2 Diabetes class     Nutrition Diagnosis (PES Statement)  · Altered nutrition related lab values related to endocrine dysfunction as evidenced by HgbA1c of 17.8    Client history:  Condition(s) associated with a diagnosis or treatment (specify) CAD, essential HTN, HD, s/p CABGx2, renal insufficiency, microalbuminuria    Biochemical data, medical test and procedures  Lab Results   Component Value Date/Time    HBA1C 17.8 (H) 09/04/2020 08:03 AM   @  Lab Results   Component Value Date/Time    POCGLUCOSE 98 06/04/2017 01:55 AM     Lab Results   Component Value Date/Time    CHOLSTRLTOT 141 09/04/2020 08:03 AM    LDL 42 09/04/2020 08:03 AM    HDL 25 (A) 09/04/2020 08:03 AM    TRIGLYCERIDE 370 (H) 09/04/2020 08:03 AM         Nutrition Intervention    Comprehensive Nutrition education Instruction or training leading to in-depth nutrition related knowledge about:  Benefits to following meal plan, Combine carb, protein and fat at each meal, Eating out, Fast food, Meal timing and spacing, Menu Planning, Metabolism of carb, protein, fat, Physical activity/exercise, Portion control, Sweets and alcohol in moderation, Heart-healthy guidelines, Label Reading, Handouts provided regarding topics discussed and Theraputic diet for Type 2 DM    Monitoring & Evaluation Plan    Behavioral-Environmental:  Behavior:  Consistent CHO intake throughout the day  Physical activity:  Increase as tolerated    Food / Nutrient Intake:  Food intake: Use the plate method recommendations for portions/balance at meals  Fluid/Beverage intake: Avoid all sweetened beverages unless utilizing to treat for low blood sugar   Macronutrient intake:  Up to 30-45 grams CHO with meals for women & 45-60 grams CHO for men, up to 15 grams CHO with snacks    Physical Signs / Symptoms:  HbA1c profiles:  Within ADA guidelines or per pt's  endocrinologist       Assessment Notes:  Evans attended day 2 of the Type 2 diabetes class today.  The pt was taught that exercise works as a medication for controlling DM.  Different exercises were shown that can be done easily at home, like walking in place, lifting light weights while sitting, etc.  It was emphasized that if exercise is a consistent part of the pt's habits that DM control will be the most ideal it can be.   Food was then discussed next, with a brief review of the patient's current eating habits. Nutrition basics was reviewed and they were taught the differences between CHO/protein/fat and their effects on BG’s.  This information was related to the plate method to help with meal planning/portions at meals; the pt was also taught to use their hands as measuring tools (fist for starch, palm for protein) to help when eating out or on a large plate.  Non-starchy vegetables were emphasized as foods that will help satisfy without raising BG’s at meals and snacks.  I stressed to the patient to not go more than 4 hours without eating and if a snack is necessary they were taught how to construct a proper snack of ~15 grams CHO and ~7 grams protein. They were instructed that non-starchy vegetables and protein are option for between meals as well if not trying to avoid low blood sugar.  Finally, we moved onto the food label and how to effectively read a label to evaluate a food.  An alternative way of meal planning was given by using the food label and CHO counting; the patient can eat up to 45-60 grams CHO at meals and up to 15 grams CHO at snacks, if needed. They were encouraged to adjust CHO intake according to activity level and blood sugar readings. Lastly, the pt set goals to try to achieve in the next 3 months to help with DM control and may follow-up with me, if needed, for a more intensive meal plan and CHO counting education.  F/u prn.

## 2020-09-30 NOTE — LETTER
Alejandra Hernandez M.D.  [address] [id number] [id number]             September 30, 2020    Dr. Hernandez,    Your patient, Evans Díaz, was seen on 9/30/20 for 3 hours regarding nutrition/exercise recommendations for diabetes as part of day 2 of our Type 2 diabetes management class.  They were taught the following information:     -The basics of nutrition and how foods affect blood sugar   -The plate method for portion control (¼ plate starch, ¼ plate protein, ½ plate non-starchy vegetables)  -Appropriate snacking recommendations  -Heart-healthy eating, including controlling/managing/improving hyperlipidemia and HTN  -Food label reading, including CHO counting  -Exercise recommendations of at least 150 minutes/week of moderate-intensity exercise on a minimum of 3 days each week    The pt was encouraged to use community resources to help improve their glycemic control and given a number of resources available to them. They were also given our phone number in the case of any question that may arise and to schedule additional visits if they desire a 1:1 appointment with the dietitian.     Thank you for your referral for this patient.  Feel free to contact us with any questions you may have at [phone].    Sincerely,    Natalya Jj RD, LD  Outpatient Dietary Educator  [facility]

## 2020-12-17 DIAGNOSIS — E11.9 DIABETES MELLITUS TYPE 2 IN NONOBESE (HCC): ICD-10-CM

## 2020-12-17 RX ORDER — BLOOD-GLUCOSE METER
KIT MISCELLANEOUS
Qty: 100 STRIP | Refills: 3 | Status: SHIPPED | OUTPATIENT
Start: 2020-12-17 | End: 2021-05-04

## 2021-01-12 ENCOUNTER — OFFICE VISIT (OUTPATIENT)
Dept: MEDICAL GROUP | Facility: MEDICAL CENTER | Age: 71
End: 2021-01-12
Payer: COMMERCIAL

## 2021-01-12 VITALS
DIASTOLIC BLOOD PRESSURE: 84 MMHG | BODY MASS INDEX: 29.77 KG/M2 | TEMPERATURE: 98.2 F | OXYGEN SATURATION: 95 % | SYSTOLIC BLOOD PRESSURE: 160 MMHG | WEIGHT: 201 LBS | HEART RATE: 76 BPM | HEIGHT: 69 IN | RESPIRATION RATE: 16 BRPM

## 2021-01-12 DIAGNOSIS — N18.32 STAGE 3B CHRONIC KIDNEY DISEASE: ICD-10-CM

## 2021-01-12 DIAGNOSIS — Z79.4 CONTROLLED TYPE 2 DIABETES MELLITUS WITHOUT COMPLICATION, WITH LONG-TERM CURRENT USE OF INSULIN (HCC): ICD-10-CM

## 2021-01-12 DIAGNOSIS — I10 ESSENTIAL HYPERTENSION: ICD-10-CM

## 2021-01-12 DIAGNOSIS — Z95.1 S/P CABG X 2: ICD-10-CM

## 2021-01-12 DIAGNOSIS — E11.9 CONTROLLED TYPE 2 DIABETES MELLITUS WITHOUT COMPLICATION, WITH LONG-TERM CURRENT USE OF INSULIN (HCC): ICD-10-CM

## 2021-01-12 DIAGNOSIS — E78.2 MIXED HYPERLIPIDEMIA: ICD-10-CM

## 2021-01-12 DIAGNOSIS — J44.9 CHRONIC OBSTRUCTIVE PULMONARY DISEASE, UNSPECIFIED COPD TYPE (HCC): ICD-10-CM

## 2021-01-12 PROCEDURE — 99214 OFFICE O/P EST MOD 30 MIN: CPT | Performed by: FAMILY MEDICINE

## 2021-01-12 RX ORDER — LISINOPRIL 40 MG/1
40 TABLET ORAL DAILY
Qty: 90 TAB | Refills: 3 | Status: SHIPPED | OUTPATIENT
Start: 2021-01-12

## 2021-01-12 RX ORDER — ATORVASTATIN CALCIUM 40 MG/1
TABLET, FILM COATED ORAL
Qty: 90 TAB | Refills: 3 | Status: SHIPPED | OUTPATIENT
Start: 2021-01-12

## 2021-01-12 ASSESSMENT — PATIENT HEALTH QUESTIONNAIRE - PHQ9: CLINICAL INTERPRETATION OF PHQ2 SCORE: 0

## 2021-01-12 ASSESSMENT — FIBROSIS 4 INDEX: FIB4 SCORE: 0.74

## 2021-01-12 NOTE — PROGRESS NOTES
CC: COPD, hypertension, hyperlipidemia, CAD, diabetes, CKD    HPI:   Evans presents today to discuss the following:    Chronic obstructive pulmonary disease, unspecified COPD type ([facility])  Patient has been asymptomatic.  Denies any cough or shortness of breath.  He has been on Breo Ellipta daily, albuterol as needed.  Not on oxygen    Essential hypertension  His blood pressure today is  high.  However patient denies any headache, chest pain, shortness of breath.  Patient is currently on lisinopril 20 g daily, he stated that has been taking his blood pressure medication on a regular basis, no side effects.     Mixed hyperlipidemia  He has been tolerating the statin. Denies muscle pain LFTs has been normal, has been on atorvastatin 40 mg daily     S/P CABG x 2  Denies any chest pain, or shortness of breath.  Carvedilol 6.5 mg twice a day, aspirin 81 mg daily, and atorvastatin 40 mg daily.,  Patient has been following up with cardiology in a regular basis.    Controlled type 2 diabetes mellitus without complication, with long-term current use of insulin ([facility])  Patient has blood glucose has dramatically improved.  Last A1c was 17, A1c today is 5.9.  Patient has been on Lantus 30 units twice a day and Humalog 15 units 2-3 times a day.  Denies any hypoglycemic episodes.    Stage 3b chronic kidney disease  Patient has been asymptomatic.  Most recent GFR was 41, creatinine 1.6.    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 05/09/2018   • S/P CABG x 2 08/03/2017   • Chronic obstructive pulmonary disease (HCC) 08/02/2017   • Essential hypertension 08/02/2017   • Hyperlipidemia 08/02/2017   • CAD (coronary  artery disease) 06/26/2015       Current Outpatient Medications   Medication Sig Dispense Refill   • atorvastatin (LIPITOR) 40 MG Tab TAKE 1 TABLET BY MOUTH EVERYDAY AT BEDTIME 90 Tab 3   • lisinopril (PRINIVIL) 20 MG Tab TAKE 1 TABLET BY MOUTH EVERY DAY 90 Tab 3   • FREESTYLE LITE strip USE TO TEST BLOOD SUGAR HIGH/LOW 3 TIMES A DAY AS NEEDED 100 Strip 3   • Insulin Pen Needle 32G X 8 MM Misc Use 4 times a day. 400 Each 3   • insulin glargine (LANTUS SOLOSTAR) 100 UNIT/ML Solution Pen-injector injection Inject 20 Units as instructed every evening. 30 mL 3   • insulin lispro (HUMALOG KWIKPEN) 100 UNIT/ML Solution Pen-injector injection PEN Inject 10 Units as instructed 3 times a day before meals. 30 mL 3   • metFORMIN (GLUCOPHAGE) 500 MG Tab TAKE 2 TABS BY MOUTH 2 TIMES A DAY, WITH MEALS. 360 Tab 3   • Fluticasone Furoate-Vilanterol (BREO ELLIPTA) 100-25 MCG/INH AEROSOL POWDER, BREATH ACTIVATED Inhale 1 Puff by mouth every day. 1 Each 3   • BREO ELLIPTA 100-25 MCG/INH AEROSOL POWDER, BREATH ACTIVATED INHALE 1 PUFF BY MOUTH EVERY BEDTIME (MAX 30 DAYS PER INSURANCE) 1 Each 3   • glipiZIDE (GLUCOTROL) 10 MG Tab Take 1 Tab by mouth 2 times a day. 180 Tab 3   • carvedilol (COREG) 6.25 MG Tab TAKE 1 TAB BY MOUTH 2 TIMES A DAY, WITH MEALS.- INS MAX 30  Tab 3   • Blood Glucose Monitoring Suppl Device Meter: Dispense Device of Insurance Preference( free style lite). Sig. Use as directed for blood sugar monitoring. #1. NR. 1 Device 0   • Lancets Lancets order: Lancets for free style lite meter. Sig: use 3 times daily  and prn ssx high or low sugar . 100 Each 3   • albuterol 108 (90 BASE) MCG/ACT Aero Soln inhalation aerosol Inhale 2 Puffs by mouth every four hours as needed. 8.5 g    • aspirin (ASA) 81 MG CHEW chewable tablet Take 1 Tab by mouth every day. 100 Tab 11   • SPIRIVA HANDIHALER 18 MCG Cap INHALE 1 CAP BY MOUTH EVERY DAY. (Patient not taking: Reported on 1/12/2021) 90 Cap 3   • lisinopril (PRINIVIL) 10 MG  "Tab TAKE 1 TABLET BY MOUTH EVERY DAY (Patient not taking: Reported on 1/12/2021) 90 Tab 1     No current facility-administered medications for this visit.          Allergies as of 01/12/2021   • (No Known Allergies)        ROS: Denies any chest pain, Shortness of breath, Changes bowel or bladder, Lower extremity edema.    Physical Exam:  /84 (BP Location: Right arm, Patient Position: Sitting)   Pulse 76   Temp 36.8 °C (98.2 °F) (Temporal)   Resp 16   Ht 1.753 m (5' 9\")   Wt 91.2 kg (201 lb)   SpO2 95%   BMI 29.68 kg/m²   Gen.: Well-developed, well-nourished, no apparent distress,pleasant and cooperative with the examination  Skin:  Warm and dry with good turgor. No rashes or suspicious lesions in visible areas  HEENT:Sinuses nontender with palpation, TMs clear, nares patent with pink mucosa and clear rhinorrhea,no septal deviation ,polyps or lesions. lips without lesions, oropharynx clear.  Neck: Trachea midline,no masses or adenopathy. No JVD.  Cor: Regular rate and rhythm without murmur, gallop or rub.  Lungs: Respirations unlabored.Clear to auscultation with equal breath sounds bilaterally. No wheezes, rhonchi.  Extremities: No cyanosis, clubbing or edema.      Monofilament foot exam: normal sensation bilaterally, no maceration or ulcers, normal peripheral pulses.    Assessment and Plan.   70 y.o. male     1. Chronic obstructive pulmonary disease, unspecified COPD type (HCC)  Stable.  Asymptomatic  Continue on Breo Ellipta daily, albuterol as needed.  Not on oxygen    2. Essential hypertension  High blood pressure  Will increase lisinopril from 20mg to 40 mg    - lisinopril (PRINIVIL) 40 MG tablet; Take 1 Tab by mouth every day.  Dispense: 90 Tab; Refill: 3    3. Mixed hyperlipidemia  Most recent lipid panel showed high triglycerides  He has been tolerating the statin. Denies muscle pain LFTs has been normal  Continue on atorvastatin 40 mg daily.  Medication refilled    - atorvastatin (LIPITOR) 40 MG " Tab; TAKE 1 TABLET BY MOUTH EVERYDAY AT BEDTIME  Dispense: 90 Tab; Refill: 3    4. S/P CABG x 2  Stable.  Asymptomatic.  Continue on statin, aspirin,and carvedilol    5. Controlled type 2 diabetes mellitus without complication, with long-term current use of insulin ([facility])  A1c today is 5.9.  There is dramatic improvement of A1c, last visit it was 17  Continue on current regimen.    - Diabetic Monofilament Lower Extremity Exam    6. Stage 3b chronic kidney disease  Most recent GFR was 41  Patient is counseled on hydration and avoiding nephrotoxic medication  Continue monitoring.

## 2021-01-15 DIAGNOSIS — Z23 NEED FOR VACCINATION: ICD-10-CM

## 2021-01-27 ENCOUNTER — IMMUNIZATION (OUTPATIENT)
Dept: FAMILY PLANNING/WOMEN'S HEALTH CLINIC | Facility: IMMUNIZATION CENTER | Age: 71
End: 2021-01-27
Attending: INTERNAL MEDICINE
Payer: COMMERCIAL

## 2021-01-27 DIAGNOSIS — Z23 ENCOUNTER FOR VACCINATION: Primary | ICD-10-CM

## 2021-01-27 DIAGNOSIS — Z23 NEED FOR VACCINATION: ICD-10-CM

## 2021-01-27 PROCEDURE — 91301 MODERNA SARS-COV-2 VACCINE: CPT

## 2021-01-27 PROCEDURE — 0011A MODERNA SARS-COV-2 VACCINE: CPT

## 2021-02-25 ENCOUNTER — IMMUNIZATION (OUTPATIENT)
Dept: FAMILY PLANNING/WOMEN'S HEALTH CLINIC | Facility: IMMUNIZATION CENTER | Age: 71
End: 2021-02-25
Attending: INTERNAL MEDICINE
Payer: COMMERCIAL

## 2021-02-25 DIAGNOSIS — Z23 ENCOUNTER FOR VACCINATION: Primary | ICD-10-CM

## 2021-02-25 PROCEDURE — 91301 MODERNA SARS-COV-2 VACCINE: CPT

## 2021-02-25 PROCEDURE — 0012A MODERNA SARS-COV-2 VACCINE: CPT

## 2021-02-26 DIAGNOSIS — J44.9 CHRONIC OBSTRUCTIVE PULMONARY DISEASE, UNSPECIFIED COPD TYPE (HCC): ICD-10-CM

## 2021-04-02 ENCOUNTER — HOSPITAL ENCOUNTER (OUTPATIENT)
Dept: LAB | Facility: MEDICAL CENTER | Age: 71
End: 2021-04-02
Attending: FAMILY MEDICINE
Payer: COMMERCIAL

## 2021-04-02 LAB
CHOLEST SERPL-MCNC: 134 MG/DL (ref 100–199)
HDLC SERPL-MCNC: 29 MG/DL
LDLC SERPL CALC-MCNC: 79 MG/DL
TRIGL SERPL-MCNC: 128 MG/DL (ref 0–149)

## 2021-04-02 PROCEDURE — 80061 LIPID PANEL: CPT

## 2021-04-02 PROCEDURE — 36415 COLL VENOUS BLD VENIPUNCTURE: CPT

## 2021-05-04 DIAGNOSIS — E11.9 DIABETES MELLITUS TYPE 2 IN NONOBESE (HCC): ICD-10-CM

## 2021-05-04 RX ORDER — BLOOD-GLUCOSE METER
KIT MISCELLANEOUS
Qty: 100 STRIP | Refills: 3 | Status: SHIPPED | OUTPATIENT
Start: 2021-05-04

## 2022-08-08 ENCOUNTER — TELEPHONE (OUTPATIENT)
Dept: HEALTH INFORMATION MANAGEMENT | Facility: OTHER | Age: 72
End: 2022-08-08

## 2022-08-08 NOTE — TELEPHONE ENCOUNTER
Outcome: Pt established Non-Rwn / oon ins / [facility]    Please transfer to Patient Outreach Team at [phone] when patient returns call.        Attempt # 1

## 2022-10-14 ENCOUNTER — TELEPHONE (OUTPATIENT)
Dept: HEALTH INFORMATION MANAGEMENT | Facility: OTHER | Age: 72
End: 2022-10-14

## 2023-11-15 ENCOUNTER — TELEPHONE (OUTPATIENT)
Dept: HEALTH INFORMATION MANAGEMENT | Facility: OTHER | Age: 73
End: 2023-11-15
Payer: MEDICARE

## (undated) DEVICE — SYRINGE SAFETY 3 ML 18 GA X 1 1/2 BLUNT LL (100/BX 8BX/CA)

## (undated) DEVICE — SENSOR SPO2 NEO LNCS ADHESIVE (20/BX) SEE USER NOTES

## (undated) DEVICE — SET FLUID WARMING STANDARD FLOW - (10/CA)

## (undated) DEVICE — BAG RESUSCITATION DISPOSABLE - WITH MASK (10 EA/CA)

## (undated) DEVICE — SUTURE OHS

## (undated) DEVICE — CANISTER SUCTION 3000ML MECHANICAL FILTER AUTO SHUTOFF MEDI-VAC NONSTERILE LF DISP  (40EA/CA)

## (undated) DEVICE — GLOVE BIOGEL PI ORTHO SZ 6 SURGICAL PF LF (40PR/BX)

## (undated) DEVICE — HEAD HOLDER JUNIOR/ADULT

## (undated) DEVICE — SODIUM CHL. INJ. 0.9% 500ML (24EA/CA 50CA/PF)

## (undated) DEVICE — SET BIFURCATED BLOOD - (48EA/CS)

## (undated) DEVICE — SUTURE 0 ETHIBOND MO6 C/R - (12/BX) 8-18 INCH ETHICON

## (undated) DEVICE — BANDAGE ELASTIC 4 IN X 5 YDS - LATEX FREE(10/BX 5BX/CA)

## (undated) DEVICE — BLOWER/MISTER (5EA/PK)

## (undated) DEVICE — SUCTION INSTRUMENT YANKAUER BULBOUS TIP W/O VENT (50EA/CA)

## (undated) DEVICE — PUNCH 4MM SHRP CNCL TIP DL - (6/BX)

## (undated) DEVICE — GLOVE BIOGEL ECLIPSE PF LATEX SIZE 8.0  (50PR/BX)

## (undated) DEVICE — SET CATHETER CENTRAL VENOUS 5X15 ORDER BY THE EACH

## (undated) DEVICE — NEPTUNE 4 PORT MANIFOLD - (20/PK)

## (undated) DEVICE — SUTURE 6-0 PROLENE RB-2 D/A 30 (36PK/BX)"

## (undated) DEVICE — SUTURE 5-0 PROLENE C-1 D/A 24 (36PK/BX)"

## (undated) DEVICE — NEEDLE SAFETY 18 GA X 1 1/2 IN (100EA/BX)

## (undated) DEVICE — GLOVE SURGICAL LATEX POWDER FREE STIRLE SZ 8 ENCORE MICROPTIC (200PR/CA)

## (undated) DEVICE — GLOVE BIOGEL PI INDICATOR SZ 6.5 SURGICAL PF LF - (50/BX 4BX/CA)

## (undated) DEVICE — PROTECTOR ULNA NERVE - (36PR/CA)

## (undated) DEVICE — SPONGE XRAY 8X4 STERL. 12PL - (10EA/TY 80TY/CA)

## (undated) DEVICE — SODIUM CHL IRRIGATION 0.9% 1000ML (12EA/CA)

## (undated) DEVICE — ELECTRODE DUAL RETURN W/ CORD - (50/PK)

## (undated) DEVICE — Device

## (undated) DEVICE — DILATORS PARSONNET 1.0MM - (5EA/CA)

## (undated) DEVICE — KIT ANESTHESIA W/CIRCUIT & 3/LT BAG W/FILTER (20EA/CA)

## (undated) DEVICE — STOPCOCK MALE 4-WAY - (50/CA)

## (undated) DEVICE — KIT ENDOHARVEST SYSTEM 8 - MUST ORDER 5 AT A TIME

## (undated) DEVICE — BAG, SPONGE COUNT 50600

## (undated) DEVICE — GOWN SURGEONS X-LARGE - DISP. (30/CA)

## (undated) DEVICE — MASK ANESTHESIA ADULT  - (100/CA)

## (undated) DEVICE — LACTATED RINGERS INJ 1000 ML - (14EA/CA 60CA/PF)

## (undated) DEVICE — TUBING INSUFFLATION - (10/BX)

## (undated) DEVICE — DRESSING TRANSPARENT FILM TEGADERM 4 X 4.75" (50EA/BX)"

## (undated) DEVICE — TUBE CHEST 36FR. STRAIGHT - (10EA/CA)

## (undated) DEVICE — PACK E SUTURE USED FOR - OPEN HEART  (5/BX)

## (undated) DEVICE — SUTURE 4-0 MONOCRYL PLUS PS-2 - 27 INCH (36/BX)

## (undated) DEVICE — WIRE STEEL 5-0 B&S 20 OHS - 5/PK 12PK/BX ITEM. D5329 OR D6625 CAN BE USED AS A SUB

## (undated) DEVICE — GLOVE BIOGEL ECLIPSE  PF LATEX SIZE 6.5 (50PR/BX)

## (undated) DEVICE — SYSTEM PREVENA INCISION MNGM - (1/EA)

## (undated) DEVICE — ARMBOARD  SMALL IV 9 INLONG - (25EA/CA)

## (undated) DEVICE — ELECTRODE 850 FOAM ADHESIVE - HYDROGEL RADIOTRNSPRNT (50/PK)

## (undated) DEVICE — GOWN WARMING STANDARD FLEX - (30/CA)

## (undated) DEVICE — MICRODRIP PRIMARY VENTED 60 (48EA/CA) THIS WAS PART #2C8428 WHICH WAS DISCONTINUED

## (undated) DEVICE — RETRACTOR OFF PUMP OCTO ONLY - 10/BX

## (undated) DEVICE — PACK VEIN - (19/CA)

## (undated) DEVICE — SOD. CHL. INJ. 0.9% 1000 ML - (14EA/CA 60CA/PF)

## (undated) DEVICE — DRAIN CHEST ADULT (6EA/CA) DELETED ITEM  ORDER #15909

## (undated) DEVICE — LEAD PACING TEMP MYO - (12/BX)

## (undated) DEVICE — SPRING BULLDOG 1/2 FORCE BLUE - (10/BX)

## (undated) DEVICE — SET LEADWIRE 5 LEAD BEDSIDE DISPOSABLE ECG (1SET OF 5/EA)

## (undated) DEVICE — KIT TOURNIQUET DLP (40EA/PK)

## (undated) DEVICE — TRANSDUCER BIFURCATED MONITORING KIT (10EA/CA)

## (undated) DEVICE — STAPLER SKIN DISP - (6/BX 10BX/CA) VISISTAT

## (undated) DEVICE — TRAY MULTI-LUMEN 7FR PRESSURE W/MAX BARRIER AND BIOPATCH - (5/CA)

## (undated) DEVICE — PACK CV DRAPING/BASIN 2PART - (1/CA)

## (undated) DEVICE — DRAPE MAYO STAND - (30/CA)

## (undated) DEVICE — TUBE E-T HI-LO CUFF 8.5MM (10EA/PK)

## (undated) DEVICE — BLADE STERNUM SAW SURGICAL 32.0 X 6.4 MM STERILE (1/EA)

## (undated) DEVICE — KIT INTROPERCUTANEOUS SHEATH - 8.5 FR W/MAX BARRIER AND BIOPATCH  (5/CA)

## (undated) DEVICE — CATHETER THERMALDILUTION SWAN - (5EA/CA)

## (undated) DEVICE — SENSOR CEREBRAL AND SOMATIC MONITORING (20/CA)

## (undated) DEVICE — KIT ROOM DECONTAMINATION

## (undated) DEVICE — SYS DLV COST CLS RM TEMP - INJECTATE (CO-SET II) (10EA/CA)

## (undated) DEVICE — SOLUTION NORMOSOL-4 INJ 1000ML

## (undated) DEVICE — BAG DECANTER (50EA/CS)

## (undated) DEVICE — TUBING CLEARLINK DUO-VENT - C-FLO (48EA/CA)

## (undated) DEVICE — SLEEVE, VASO, THIGH, MED

## (undated) DEVICE — BLANKET WARMING CARDIAC STRL - (5/CA)

## (undated) DEVICE — GLOVE BIOGEL SZ 6.5 SURGICAL PF LTX (50PR/BX 4BX/CA)

## (undated) DEVICE — BLADE BEAVER 6900 MINI SHARP ALL AROUND (20/CA)

## (undated) DEVICE — SUTURE 4-0 30CM STRATAFIX SPIRAL PS-2 (12EA/BX)

## (undated) DEVICE — INSERT STEALTH #1 - 10/BX

## (undated) DEVICE — TUBING PRSS MNTR 72IN M/ M LL - (25/BX) MONIT. LINE W/MALE L/L

## (undated) DEVICE — LEAD SET 6 DISP. EKG NIHON KOHDEN

## (undated) DEVICE — PUNCH DISP VASCULAR 4.4 - 6/BX

## (undated) DEVICE — INSERT STEALTH #5 - (10/BX)

## (undated) DEVICE — KIT RADIAL ARTERY 20GA W/MAX BARRIER AND BIOPATCH  (5EA/CA) #10740 IS FOR THE SET RADIAL ARTERIAL

## (undated) DEVICE — FIBRILLAR SURGICEL 4X4 - 10/CA

## (undated) DEVICE — SET EXTENSION WITH 2 PORTS (48EA/CA) ***PART #2C8610 IS A SUBSTITUTE*****

## (undated) DEVICE — TRAY SURESTEP FOLEY TEMP SENSING 16FR (10EA/CA) ORDER  #18764 FOR TEMP FOLEY ONLY

## (undated) DEVICE — SYRINGE 30 ML LL (56/BX)

## (undated) DEVICE — SUTURE 8-0 PROLENE BV175-8 EVERPONT

## (undated) DEVICE — GLOVE BIOGEL INDICATOR SZ 7SURGICAL PF LTX - (50/BX 4BX/CA)

## (undated) DEVICE — DRESSING TRANSPARENT FILM TEGADERM 2.375 X 2.75"  (100EA/BX)"

## (undated) DEVICE — BONE WAX (12PK/BX)